# Patient Record
Sex: FEMALE | Race: WHITE | NOT HISPANIC OR LATINO | URBAN - METROPOLITAN AREA
[De-identification: names, ages, dates, MRNs, and addresses within clinical notes are randomized per-mention and may not be internally consistent; named-entity substitution may affect disease eponyms.]

---

## 2023-01-22 ENCOUNTER — EMERGENCY (EMERGENCY)
Facility: HOSPITAL | Age: 83
LOS: 1 days | Discharge: HOME | End: 2023-01-22
Attending: EMERGENCY MEDICINE | Admitting: EMERGENCY MEDICINE
Payer: MEDICARE

## 2023-01-22 VITALS
DIASTOLIC BLOOD PRESSURE: 80 MMHG | HEART RATE: 80 BPM | TEMPERATURE: 98 F | RESPIRATION RATE: 18 BRPM | SYSTOLIC BLOOD PRESSURE: 150 MMHG | OXYGEN SATURATION: 98 %

## 2023-01-22 VITALS
HEIGHT: 62 IN | DIASTOLIC BLOOD PRESSURE: 90 MMHG | WEIGHT: 130.07 LBS | OXYGEN SATURATION: 98 % | SYSTOLIC BLOOD PRESSURE: 190 MMHG | HEART RATE: 99 BPM | TEMPERATURE: 98 F | RESPIRATION RATE: 18 BRPM

## 2023-01-22 LAB
APPEARANCE UR: CLEAR — SIGNIFICANT CHANGE UP
BILIRUB UR-MCNC: NEGATIVE — SIGNIFICANT CHANGE UP
COLOR SPEC: YELLOW — SIGNIFICANT CHANGE UP
DIFF PNL FLD: NEGATIVE — SIGNIFICANT CHANGE UP
GLUCOSE UR QL: NEGATIVE MG/DL — SIGNIFICANT CHANGE UP
KETONES UR-MCNC: NEGATIVE — SIGNIFICANT CHANGE UP
LEUKOCYTE ESTERASE UR-ACNC: NEGATIVE — SIGNIFICANT CHANGE UP
NITRITE UR-MCNC: NEGATIVE — SIGNIFICANT CHANGE UP
PH UR: 8 — SIGNIFICANT CHANGE UP (ref 5–8)
PROT UR-MCNC: NEGATIVE MG/DL — SIGNIFICANT CHANGE UP
SP GR SPEC: 1.01 — SIGNIFICANT CHANGE UP (ref 1.01–1.02)
UROBILINOGEN FLD QL: NEGATIVE MG/DL — SIGNIFICANT CHANGE UP

## 2023-01-22 PROCEDURE — 70450 CT HEAD/BRAIN W/O DYE: CPT | Mod: MA

## 2023-01-22 PROCEDURE — 72125 CT NECK SPINE W/O DYE: CPT | Mod: MA

## 2023-01-22 PROCEDURE — 72170 X-RAY EXAM OF PELVIS: CPT | Mod: 26

## 2023-01-22 PROCEDURE — 81003 URINALYSIS AUTO W/O SCOPE: CPT

## 2023-01-22 PROCEDURE — 99285 EMERGENCY DEPT VISIT HI MDM: CPT

## 2023-01-22 PROCEDURE — 99284 EMERGENCY DEPT VISIT MOD MDM: CPT | Mod: 25

## 2023-01-22 PROCEDURE — 70450 CT HEAD/BRAIN W/O DYE: CPT | Mod: 26,MA

## 2023-01-22 PROCEDURE — 72170 X-RAY EXAM OF PELVIS: CPT

## 2023-01-22 PROCEDURE — 87086 URINE CULTURE/COLONY COUNT: CPT

## 2023-01-22 PROCEDURE — 72125 CT NECK SPINE W/O DYE: CPT | Mod: 26,MA

## 2023-01-22 RX ORDER — ALPRAZOLAM 0.25 MG
0 TABLET ORAL
Qty: 0 | Refills: 0 | DISCHARGE

## 2023-01-22 NOTE — ED PROVIDER NOTE - CARE PROVIDER_API CALL
Jose Miguel Ford (DO)  Medicine  [address], [address]  [place] [address]  Phone: [phone]  Fax: [phone]  Follow Up Time:

## 2023-01-22 NOTE — ED PROVIDER NOTE - PROGRESS NOTE DETAILS
Family states that patient commonly lies on the floor and the staff at the [facility] did not realize that. No evidence of head injury, and family doubts any injury. CTs already performed. Will await results

## 2023-01-22 NOTE — ED PROVIDER NOTE - PATIENT PORTAL LINK FT
You can access the FollowMyHealth Patient Portal offered by [facility] by registering at the following website: [email or web address]. By joining [facility]’s FollowMyHealth portal, you will also be able to view your health information using other applications (apps) compatible with our system.

## 2023-01-22 NOTE — ED PROVIDER NOTE - NSICDXPASTMEDICALHX_GEN_ALL_CORE_FT
PAST MEDICAL HISTORY:  DVT (deep venous thrombosis)     GERD (gastroesophageal reflux disease)     HTN (hypertension)     Insomnia

## 2023-01-22 NOTE — ED PROVIDER NOTE - OBJECTIVE STATEMENT
Patient sent in from assisted living after a fall.  Patient states she was given a medication to help her sleep.  Patient has known history of insomnia according to paperwork.  Patient states she did not sleep in her bed but rather slept on a bench.  States she went to get up and fell hitting her head on the floor.  Patient denies any loss of consciousness.  Patient complains of some pain to the left side of her neck and a mild headache.  No nausea vomiting.  No change in vision.  Patient denies any other injury.

## 2023-01-23 LAB
CULTURE RESULTS: SIGNIFICANT CHANGE UP
SPECIMEN SOURCE: SIGNIFICANT CHANGE UP

## 2023-02-05 ENCOUNTER — INPATIENT (INPATIENT)
Facility: HOSPITAL | Age: 83
LOS: 3 days | Discharge: NURSING, REHAB OR LONG-TERM CARE | DRG: 312 | End: 2023-02-09
Attending: INTERNAL MEDICINE | Admitting: INTERNAL MEDICINE
Payer: MEDICARE

## 2023-02-05 VITALS
HEART RATE: 64 BPM | HEIGHT: 62 IN | DIASTOLIC BLOOD PRESSURE: 74 MMHG | OXYGEN SATURATION: 100 % | TEMPERATURE: 98 F | RESPIRATION RATE: 20 BRPM | SYSTOLIC BLOOD PRESSURE: 143 MMHG

## 2023-02-05 DIAGNOSIS — R41.82 ALTERED MENTAL STATUS, UNSPECIFIED: ICD-10-CM

## 2023-02-05 LAB
A1C WITH ESTIMATED AVERAGE GLUCOSE RESULT: 5.5 % — SIGNIFICANT CHANGE UP (ref 4–5.6)
ALBUMIN SERPL ELPH-MCNC: 3.6 G/DL — SIGNIFICANT CHANGE UP (ref 3.3–5)
ALP SERPL-CCNC: 103 U/L — SIGNIFICANT CHANGE UP (ref 30–120)
ALT FLD-CCNC: 16 U/L DA — SIGNIFICANT CHANGE UP (ref 10–60)
ANION GAP SERPL CALC-SCNC: 8 MMOL/L — SIGNIFICANT CHANGE UP (ref 5–17)
APTT BLD: 40 SEC — HIGH (ref 27.5–35.5)
AST SERPL-CCNC: 27 U/L — SIGNIFICANT CHANGE UP (ref 10–40)
BASOPHILS # BLD AUTO: 0.04 K/UL — SIGNIFICANT CHANGE UP (ref 0–0.2)
BASOPHILS NFR BLD AUTO: 0.4 % — SIGNIFICANT CHANGE UP (ref 0–2)
BILIRUB SERPL-MCNC: 0.4 MG/DL — SIGNIFICANT CHANGE UP (ref 0.2–1.2)
BUN SERPL-MCNC: 16 MG/DL — SIGNIFICANT CHANGE UP (ref 7–23)
CALCIUM SERPL-MCNC: 8.9 MG/DL — SIGNIFICANT CHANGE UP (ref 8.4–10.5)
CHLORIDE SERPL-SCNC: 100 MMOL/L — SIGNIFICANT CHANGE UP (ref 96–108)
CO2 SERPL-SCNC: 25 MMOL/L — SIGNIFICANT CHANGE UP (ref 22–31)
CREAT SERPL-MCNC: 0.86 MG/DL — SIGNIFICANT CHANGE UP (ref 0.5–1.3)
EGFR: 67 ML/MIN/1.73M2 — SIGNIFICANT CHANGE UP
EOSINOPHIL # BLD AUTO: 0.34 K/UL — SIGNIFICANT CHANGE UP (ref 0–0.5)
EOSINOPHIL NFR BLD AUTO: 3.2 % — SIGNIFICANT CHANGE UP (ref 0–6)
ESTIMATED AVERAGE GLUCOSE: 111 MG/DL — SIGNIFICANT CHANGE UP (ref 68–114)
FLUAV AG NPH QL: SIGNIFICANT CHANGE UP
FLUBV AG NPH QL: SIGNIFICANT CHANGE UP
GLUCOSE SERPL-MCNC: 106 MG/DL — HIGH (ref 70–99)
HCT VFR BLD CALC: 43.2 % — SIGNIFICANT CHANGE UP (ref 34.5–45)
HGB BLD-MCNC: 14.1 G/DL — SIGNIFICANT CHANGE UP (ref 11.5–15.5)
IMM GRANULOCYTES NFR BLD AUTO: 0.3 % — SIGNIFICANT CHANGE UP (ref 0–0.9)
INR BLD: 1.1 RATIO — SIGNIFICANT CHANGE UP (ref 0.88–1.16)
LYMPHOCYTES # BLD AUTO: 1.82 K/UL — SIGNIFICANT CHANGE UP (ref 1–3.3)
LYMPHOCYTES # BLD AUTO: 17.3 % — SIGNIFICANT CHANGE UP (ref 13–44)
MCHC RBC-ENTMCNC: 31.1 PG — SIGNIFICANT CHANGE UP (ref 27–34)
MCHC RBC-ENTMCNC: 32.6 GM/DL — SIGNIFICANT CHANGE UP (ref 32–36)
MCV RBC AUTO: 95.2 FL — SIGNIFICANT CHANGE UP (ref 80–100)
MONOCYTES # BLD AUTO: 1.08 K/UL — HIGH (ref 0–0.9)
MONOCYTES NFR BLD AUTO: 10.3 % — SIGNIFICANT CHANGE UP (ref 2–14)
NEUTROPHILS # BLD AUTO: 7.22 K/UL — SIGNIFICANT CHANGE UP (ref 1.8–7.4)
NEUTROPHILS NFR BLD AUTO: 68.5 % — SIGNIFICANT CHANGE UP (ref 43–77)
NRBC # BLD: 0 /100 WBCS — SIGNIFICANT CHANGE UP (ref 0–0)
PLATELET # BLD AUTO: 229 K/UL — SIGNIFICANT CHANGE UP (ref 150–400)
POTASSIUM SERPL-MCNC: 4 MMOL/L — SIGNIFICANT CHANGE UP (ref 3.5–5.3)
POTASSIUM SERPL-SCNC: 4 MMOL/L — SIGNIFICANT CHANGE UP (ref 3.5–5.3)
PROT SERPL-MCNC: 7.5 G/DL — SIGNIFICANT CHANGE UP (ref 6–8.3)
PROTHROM AB SERPL-ACNC: 12.7 SEC — SIGNIFICANT CHANGE UP (ref 10.5–13.4)
RBC # BLD: 4.54 M/UL — SIGNIFICANT CHANGE UP (ref 3.8–5.2)
RBC # FLD: 13 % — SIGNIFICANT CHANGE UP (ref 10.3–14.5)
RSV RNA NPH QL NAA+NON-PROBE: SIGNIFICANT CHANGE UP
SARS-COV-2 RNA SPEC QL NAA+PROBE: SIGNIFICANT CHANGE UP
SODIUM SERPL-SCNC: 133 MMOL/L — LOW (ref 135–145)
TROPONIN I, HIGH SENSITIVITY RESULT: 70.7 NG/L — HIGH
WBC # BLD: 10.53 K/UL — HIGH (ref 3.8–10.5)
WBC # FLD AUTO: 10.53 K/UL — HIGH (ref 3.8–10.5)

## 2023-02-05 PROCEDURE — 99291 CRITICAL CARE FIRST HOUR: CPT

## 2023-02-05 PROCEDURE — 70496 CT ANGIOGRAPHY HEAD: CPT | Mod: 26,MA

## 2023-02-05 PROCEDURE — 71045 X-RAY EXAM CHEST 1 VIEW: CPT | Mod: 26

## 2023-02-05 PROCEDURE — 70450 CT HEAD/BRAIN W/O DYE: CPT | Mod: 26,MA,59

## 2023-02-05 PROCEDURE — 99223 1ST HOSP IP/OBS HIGH 75: CPT | Mod: AI

## 2023-02-05 PROCEDURE — 70498 CT ANGIOGRAPHY NECK: CPT | Mod: 26,MA

## 2023-02-05 PROCEDURE — 93010 ELECTROCARDIOGRAM REPORT: CPT

## 2023-02-05 RX ORDER — RISPERIDONE 4 MG/1
1 TABLET ORAL
Qty: 0 | Refills: 0 | DISCHARGE

## 2023-02-05 RX ORDER — FAMOTIDINE 10 MG/ML
20 INJECTION INTRAVENOUS DAILY
Refills: 0 | Status: DISCONTINUED | OUTPATIENT
Start: 2023-02-05 | End: 2023-02-09

## 2023-02-05 RX ORDER — LANOLIN ALCOHOL/MO/W.PET/CERES
5 CREAM (GRAM) TOPICAL AT BEDTIME
Refills: 0 | Status: DISCONTINUED | OUTPATIENT
Start: 2023-02-05 | End: 2023-02-09

## 2023-02-05 RX ORDER — PREGABALIN 225 MG/1
1000 CAPSULE ORAL
Qty: 0 | Refills: 0 | DISCHARGE

## 2023-02-05 RX ORDER — RISPERIDONE 4 MG/1
0.25 TABLET ORAL
Refills: 0 | Status: DISCONTINUED | OUTPATIENT
Start: 2023-02-05 | End: 2023-02-09

## 2023-02-05 RX ORDER — LOSARTAN POTASSIUM 100 MG/1
50 TABLET, FILM COATED ORAL DAILY
Refills: 0 | Status: DISCONTINUED | OUTPATIENT
Start: 2023-02-05 | End: 2023-02-09

## 2023-02-05 RX ORDER — PREGABALIN 225 MG/1
0 CAPSULE ORAL
Qty: 0 | Refills: 0 | DISCHARGE

## 2023-02-05 RX ORDER — ATORVASTATIN CALCIUM 80 MG/1
40 TABLET, FILM COATED ORAL AT BEDTIME
Refills: 0 | Status: DISCONTINUED | OUTPATIENT
Start: 2023-02-05 | End: 2023-02-09

## 2023-02-05 RX ORDER — METOPROLOL TARTRATE 50 MG
1 TABLET ORAL
Qty: 0 | Refills: 0 | DISCHARGE

## 2023-02-05 RX ORDER — POLYETHYLENE GLYCOL 3350 17 G/17G
0 POWDER, FOR SOLUTION ORAL
Qty: 0 | Refills: 0 | DISCHARGE

## 2023-02-05 RX ORDER — LANOLIN ALCOHOL/MO/W.PET/CERES
1 CREAM (GRAM) TOPICAL
Qty: 0 | Refills: 0 | DISCHARGE

## 2023-02-05 RX ORDER — ALPRAZOLAM 0.25 MG
0.25 TABLET ORAL
Refills: 0 | Status: DISCONTINUED | OUTPATIENT
Start: 2023-02-05 | End: 2023-02-09

## 2023-02-05 RX ORDER — ARIPIPRAZOLE 15 MG/1
10 TABLET ORAL DAILY
Refills: 0 | Status: DISCONTINUED | OUTPATIENT
Start: 2023-02-05 | End: 2023-02-09

## 2023-02-05 RX ORDER — MULTIVIT-MIN/FERROUS GLUCONATE 9 MG/15 ML
1 LIQUID (ML) ORAL DAILY
Refills: 0 | Status: DISCONTINUED | OUTPATIENT
Start: 2023-02-05 | End: 2023-02-09

## 2023-02-05 RX ORDER — METOPROLOL TARTRATE 50 MG
25 TABLET ORAL
Refills: 0 | Status: DISCONTINUED | OUTPATIENT
Start: 2023-02-05 | End: 2023-02-09

## 2023-02-05 RX ORDER — FAMOTIDINE 10 MG/ML
0 INJECTION INTRAVENOUS
Qty: 0 | Refills: 0 | DISCHARGE

## 2023-02-05 RX ORDER — POLYETHYLENE GLYCOL 3350 17 G/17G
17 POWDER, FOR SOLUTION ORAL DAILY
Refills: 0 | Status: DISCONTINUED | OUTPATIENT
Start: 2023-02-05 | End: 2023-02-09

## 2023-02-05 RX ADMIN — Medication 0.25 MILLIGRAM(S): at 17:21

## 2023-02-05 RX ADMIN — ATORVASTATIN CALCIUM 40 MILLIGRAM(S): 80 TABLET, FILM COATED ORAL at 22:06

## 2023-02-05 RX ADMIN — RISPERIDONE 0.25 MILLIGRAM(S): 4 TABLET ORAL at 17:21

## 2023-02-05 RX ADMIN — Medication 5 MILLIGRAM(S): at 22:06

## 2023-02-05 RX ADMIN — Medication 25 MILLIGRAM(S): at 17:21

## 2023-02-05 NOTE — ED ADULT NURSE REASSESSMENT NOTE - NS ED NURSE REASSESS COMMENT FT1
Attempted telestroke consult, unable to reach provider. Chantel CASTRO states he spoke with neuro and do not need assessment at this time. Plan for admission.

## 2023-02-05 NOTE — ED PROVIDER NOTE - OBJECTIVE STATEMENT
82-year-old female with history of hypertension, hypocalcemia, anemia, GERD, anxiety, DVT presents with 5 by EMS from the [facility].  Patient reportedly eating at her usual baseline mental status, when he suddenly became unresponsive.  EMS was called and found patient was unresponsive.  Patient then became more awake while in route to the hospital with some left-sided facial droop and right arm weakness.  Pt with some vomiting PTA as well. The symptoms seem to be improving since onset.  No known trauma.  Patient with reported history of dementia, unable to get additional history.

## 2023-02-05 NOTE — ED ADULT NURSE REASSESSMENT NOTE - NS ED NURSE REASSESS COMMENT FT1
Patient transferred to hospital bed for comfort. Bed alarm on, fall risk bracelet in place, nonstick fall socks in place, bed lock and in lowest position, call bell within reach, bed in front of nurses station for enhanced supervision. Pt eric Dinero at bedside. Son reported patient "normally sundowns at night, she will get confused, believe she sees people/things in the room that aren't there". As per son, patient reported to be at baseline mental status at this time. Pt currently calm, resting in bed, NAD noted.

## 2023-02-05 NOTE — H&P ADULT - HISTORY OF PRESENT ILLNESS
82-year-old female with history of hypertension, hypocalcemia, anemia, GERD, anxiety, DVT presents with 5 by EMS from the [facility].  Patient reportedly eating at her usual baseline mental status, when he suddenly became unresponsive.  EMS was called and found patient was unresponsive.  Patient then became more awake while in route to the hospital with some left-sided facial droop and right arm weakness.  Pt with some vomiting PTA as well. The symptoms seem to be improving since onset.  No known trauma.  on my examination pt mentation around baseline, no facial droop appreciated ct head no acute events, admitted for TIA

## 2023-02-05 NOTE — H&P ADULT - NSHPPHYSICALEXAM_GEN_ALL_CORE
PHYSICAL EXAM:  Vital Signs Last 24 Hrs  T(C): 36.4 (05 Feb 2023 14:25), Max: 36.4 (05 Feb 2023 14:25)  T(F): 97.6 (05 Feb 2023 14:25), Max: 97.6 (05 Feb 2023 14:25)  HR: 77 (05 Feb 2023 15:26) (64 - 77)  BP: 140/63 (05 Feb 2023 15:26) (124/72 - 143/74)  BP(mean): --  RR: 18 (05 Feb 2023 15:26) (18 - 20)  SpO2: 97% (05 Feb 2023 15:26) (96% - 100%)    Parameters below as of 05 Feb 2023 15:26  Patient On (Oxygen Delivery Method): room air        GENERAL: NAD, wHEAD:  Atraumatic, Normocephalic  EYES: EOMI, PERRLA, conjunctiva and sclera clear  ENMT: No tonsillar erythema, exudates, or enlargement; Moist mucous membranes, Good dentition, No lesions  NECK: Supple, No JVD, Normal thyroid  NERVOUS SYSTEM:  Alert & Oriented X3, Good concentration; Motor Strength 5/5 B/L upper and lower extremities; CHEST/LUNG: Clear to auscultation bilaterally; No rales, rhonchi, wheezing, or rubs  HEART: Regular rate and rhythm; No murmurs, rubs, or gallops  ABDOMEN: Soft, Nontender, Nondistended; Bowel sounds present  EXTREMITIES:  2+ Peripheral Pulses, No clubbing, cyanosis, or edema  LYMPH: No lymphadenopathy noted  SKIN: No rashes or lesions

## 2023-02-05 NOTE — ED ADULT NURSE NOTE - BEFAST EYES
Problem: Falls - Risk of:  Goal: Will remain free from falls  Description  Will remain free from falls  1/6/2020 2128 by Diya Alfonso  Outcome: Ongoing     Problem: Falls - Risk of:  Goal: Absence of physical injury  Description  Absence of physical injury  1/6/2020 2128 by Diya Calabrese  Outcome: Ongoing   Patient remains free from falls and injury patient safety will continue to be maintained per staffing rounds Q15. No

## 2023-02-05 NOTE — ED PROVIDER NOTE - DIFFERENTIAL DIAGNOSIS
Rule out CVA, electrolyte abnormality, infectious cause, other acute pathology Differential Diagnosis

## 2023-02-05 NOTE — ED ADULT NURSE NOTE - OBJECTIVE STATEMENT
83 y/o F PMH HTN, DVT, anemia, GERD, BIB EMS from [facility] for unresponsiveness. Per EMS pt was eating, became unresponsive, EMS called at 1350. EMS arrived to scene, noted RT sided facial droop, RUE weakness, and slurred speech. Code stroke called at 1426, Pt arrives responsive to verbal stimuli, emesis on clothing, STEVENS x4.  Pt taken directly to CT, upon return to room, pt mentation improving, able to answer questions and follow commands, motor, strength and sensation intact in upper and lower extremities. PERRL 3R bilaterally, EOMI. Endorsing blurry vision R> L.  Denies CP, SOB, n/v/d, fevers, chills, abdominal pain, urinary symptoms, weakness, fatigue, numbness, tingling in upper and lower extremities, HA, blurry vision. VSS updated on plan of care. 83 y/o F PMH HTN, DVT, anemia, GERD, BIB EMS from [facility] for unresponsiveness. Per EMS pt was eating, became unresponsive, EMS called at 1350. EMS arrived to scene, noted RT sided facial droop, RUE weakness, and slurred speech. Code stroke called at 1426, Pt arrives responsive to verbal stimuli, emesis on clothing, STEVENS x4.  Pt taken directly to CT, upon return to room, pt mentation improving, able to answer questions and follow commands, motor, strength and sensation intact in upper and lower extremities. PERRL 3R bilaterally, EOMI. Endorsing blurry vision R> L.  Denies CP, SOB, n/v/d, fevers, chills, abdominal pain, urinary symptoms, weakness, fatigue, numbness, tingling in upper and lower extremities, HA. VSS updated on plan of care.

## 2023-02-05 NOTE — H&P ADULT - NSHPLABSRESULTS_GEN_ALL_CORE
14.1   10.53 )-----------( 229      ( 05 Feb 2023 14:28 )             43.2       02-05    133<L>  |  100  |  16  ----------------------------<  106<H>  4.0   |  25  |  0.86    Ca    8.9      05 Feb 2023 14:28    TPro  7.5  /  Alb  3.6  /  TBili  0.4  /  DBili  x   /  AST  27  /  ALT  16  /  AlkPhos  103  02-05                  PT/INR - ( 05 Feb 2023 14:28 )   PT: 12.7 sec;   INR: 1.10 ratio         PTT - ( 05 Feb 2023 14:28 )  PTT:40.0 sec    Lactate Trend            CAPILLARY BLOOD GLUCOSE

## 2023-02-05 NOTE — PATIENT PROFILE ADULT - FALL HARM RISK - HARM RISK INTERVENTIONS
Assistance with ambulation/Assistance OOB with selected safe patient handling equipment/Communicate Risk of Fall with Harm to all staff/Discuss with provider need for PT consult/Monitor gait and stability/Provide patient with walking aids - walker, cane, crutches/Reinforce activity limits and safety measures with patient and family/Tailored Fall Risk Interventions/Use of alarms - bed, chair and/or voice tab/Visual Cue: Yellow wristband and red socks/Bed in lowest position, wheels locked, appropriate side rails in place/Call bell, personal items and telephone in reach/Instruct patient to call for assistance before getting out of bed or chair/Non-slip footwear when patient is out of bed/[place] to call system/Physically safe environment - no spills, clutter or unnecessary equipment/Purposeful Proactive Rounding/Room/bathroom lighting operational, light cord in reach

## 2023-02-05 NOTE — ED PROVIDER NOTE - ENMT, MLM
Airway patent, Nasal mucosa clear. Mouth with normal mucosa. Throat has no vesicles, no oropharyngeal exudates and uvula is midline. neck supple. no meningeal signs . no ext signs of head trauma.

## 2023-02-05 NOTE — ED ADULT TRIAGE NOTE - CHIEF COMPLAINT QUOTE
AMS x40 mins per [facility] staff, onset after eating lunch; possible L side facial droop/L side weakness   vomit x1 w/ EMS    in ED

## 2023-02-05 NOTE — ED PROVIDER NOTE - PROGRESS NOTE DETAILS
Patient/EMS Patient doing well at this time.  Patient now completely asymptomatic.  NIH stroke scale equals 0.  Patient is awake and alert, at her normal baseline mental status. Discussed with patient daughter-in-law, agree with treatment plan, holding off on tPA as patient is back to baseline. Discussed with telestroke, Dr. Jansen, who states at this point as patient is asymptomatic with a stroke scale of 0, even with CT angio findings, okay to keep at [facility], can do a work-up here and they can call with any acute changes in patient's status. Discussed with telestroke, Dr. Streeter, who states at this point as patient is asymptomatic with a stroke scale of 0, even with CT angio findings, okay to keep at [facility], can do a work-up here and they can call with any acute changes in patient's status. Patient doing well, no acute complaints at this time.  Had a long discussion with patient and family regarding all findings, need for admission for further work-up and evaluation. Discussed with Dr. Leach, will see patient to admit.

## 2023-02-05 NOTE — ED PROVIDER NOTE - CLINICAL SUMMARY MEDICAL DECISION MAKING FREE TEXT BOX
Altered mental status, sudden onset today with vomiting, facial droop and arm weakness associated with symptoms as well.  We will check labs, x-ray, CT, CT angio, IV fluids, will consider tPA if symptoms persist.

## 2023-02-05 NOTE — H&P ADULT - ASSESSMENT
82-year-old female with history of hypertension, hypocalcemia, anemia, GERD, anxiety, DVT presents with 5 by EMS from the [facility].  admitted for TIA vs cva    TIA  slureed speech, which resolved  head CT No acute hemorrhage, mass effect or extra-axial collections.   Ct angio  Multifocal areas of narrowing are likely on the basis of   atherosclerotic disease with the most focal stenosis along the proximal   posterior left M2 branch.  Neuro eval  asa/statin  lipid panel  cartoid doppler/hotler/echo    depression/anxiety  abilify/risperdone  mood stable    htn  lopressor  losartan    Insomnia  melatonin    DVT prophylaxis    time spent 45 minutes, discussd with RN, and family

## 2023-02-05 NOTE — ED ADULT NURSE REASSESSMENT NOTE - NS ED NURSE REASSESS COMMENT FT1
Report taken from ANDRY Dowd at 1900. Pt resting comfortably in stretcher, NAD noted at this time, respirations even and unlabored. Pt AAOx4, VS as documented, NIH=0. PERRL intact. No facial droop, arm drift, slurred speech noted on assessment. Pt denies pain/discomfort at this time. Pt denies numbness/tingling, CP, SOB, N/V, abdominal pain, headache, vision changes, neck pain, back pain. Pt reports she was given food earlier and not hungry at this time. Pt continued on puriwick and not soiled at this time. Pt provided warm blankets. Bed locked and in lowest position at this time, call bell within reach. Pt aware of admission to hospital and awaiting bed assignment upstairs. Report taken from ANDRY Dowd at 1900. Pt resting comfortably in stretcher, NAD noted at this time, respirations even and unlabored. Pt AAOx4, VS as documented, NIH=0. PERRL intact. No facial droop, arm drift, slurred speech noted on assessment. Cardiac monitor in place, normal sinus rhythm with rate in 60's noted at this time. Pt denies pain/discomfort at this time. Pt denies numbness/tingling, CP, SOB, N/V, abdominal pain, headache, vision changes, neck pain, back pain. Pt reports she was given food earlier and not hungry at this time. Pt continued on puriwick and not soiled at this time. Pt provided warm blankets. Bed locked and in lowest position at this time, call bell within reach. Pt aware of admission to hospital and awaiting bed assignment upstairs.

## 2023-02-06 ENCOUNTER — TRANSCRIPTION ENCOUNTER (OUTPATIENT)
Age: 83
End: 2023-02-06

## 2023-02-06 PROBLEM — K21.9 GASTRO-ESOPHAGEAL REFLUX DISEASE WITHOUT ESOPHAGITIS: Chronic | Status: ACTIVE | Noted: 2023-01-22

## 2023-02-06 PROBLEM — I10 ESSENTIAL (PRIMARY) HYPERTENSION: Chronic | Status: ACTIVE | Noted: 2023-01-22

## 2023-02-06 PROBLEM — G47.00 INSOMNIA, UNSPECIFIED: Chronic | Status: ACTIVE | Noted: 2023-01-22

## 2023-02-06 PROBLEM — I82.409 ACUTE EMBOLISM AND THROMBOSIS OF UNSPECIFIED DEEP VEINS OF UNSPECIFIED LOWER EXTREMITY: Chronic | Status: ACTIVE | Noted: 2023-01-22

## 2023-02-06 LAB
ALBUMIN SERPL ELPH-MCNC: 3.4 G/DL — SIGNIFICANT CHANGE UP (ref 3.3–5)
ALP SERPL-CCNC: 94 U/L — SIGNIFICANT CHANGE UP (ref 30–120)
ALT FLD-CCNC: 18 U/L DA — SIGNIFICANT CHANGE UP (ref 10–60)
ANION GAP SERPL CALC-SCNC: 10 MMOL/L — SIGNIFICANT CHANGE UP (ref 5–17)
AST SERPL-CCNC: 23 U/L — SIGNIFICANT CHANGE UP (ref 10–40)
BILIRUB SERPL-MCNC: 0.5 MG/DL — SIGNIFICANT CHANGE UP (ref 0.2–1.2)
BUN SERPL-MCNC: 17 MG/DL — SIGNIFICANT CHANGE UP (ref 7–23)
CALCIUM SERPL-MCNC: 8.9 MG/DL — SIGNIFICANT CHANGE UP (ref 8.4–10.5)
CHLORIDE SERPL-SCNC: 105 MMOL/L — SIGNIFICANT CHANGE UP (ref 96–108)
CHOLEST SERPL-MCNC: 134 MG/DL — SIGNIFICANT CHANGE UP
CO2 SERPL-SCNC: 28 MMOL/L — SIGNIFICANT CHANGE UP (ref 22–31)
CREAT SERPL-MCNC: 0.63 MG/DL — SIGNIFICANT CHANGE UP (ref 0.5–1.3)
EGFR: 87 ML/MIN/1.73M2 — SIGNIFICANT CHANGE UP
GLUCOSE BLDC GLUCOMTR-MCNC: 227 MG/DL — HIGH (ref 70–99)
GLUCOSE SERPL-MCNC: 90 MG/DL — SIGNIFICANT CHANGE UP (ref 70–99)
HCT VFR BLD CALC: 41.6 % — SIGNIFICANT CHANGE UP (ref 34.5–45)
HDLC SERPL-MCNC: 50 MG/DL — LOW
HGB BLD-MCNC: 13.6 G/DL — SIGNIFICANT CHANGE UP (ref 11.5–15.5)
LIPID PNL WITH DIRECT LDL SERPL: 62 MG/DL — SIGNIFICANT CHANGE UP
MCHC RBC-ENTMCNC: 30.8 PG — SIGNIFICANT CHANGE UP (ref 27–34)
MCHC RBC-ENTMCNC: 32.7 GM/DL — SIGNIFICANT CHANGE UP (ref 32–36)
MCV RBC AUTO: 94.3 FL — SIGNIFICANT CHANGE UP (ref 80–100)
NON HDL CHOLESTEROL: 84 MG/DL — SIGNIFICANT CHANGE UP
NRBC # BLD: 0 /100 WBCS — SIGNIFICANT CHANGE UP (ref 0–0)
PLATELET # BLD AUTO: 220 K/UL — SIGNIFICANT CHANGE UP (ref 150–400)
POTASSIUM SERPL-MCNC: 3.3 MMOL/L — LOW (ref 3.5–5.3)
POTASSIUM SERPL-SCNC: 3.3 MMOL/L — LOW (ref 3.5–5.3)
PROT SERPL-MCNC: 7.1 G/DL — SIGNIFICANT CHANGE UP (ref 6–8.3)
RBC # BLD: 4.41 M/UL — SIGNIFICANT CHANGE UP (ref 3.8–5.2)
RBC # FLD: 13 % — SIGNIFICANT CHANGE UP (ref 10.3–14.5)
SODIUM SERPL-SCNC: 143 MMOL/L — SIGNIFICANT CHANGE UP (ref 135–145)
TRIGL SERPL-MCNC: 111 MG/DL — SIGNIFICANT CHANGE UP
WBC # BLD: 7.17 K/UL — SIGNIFICANT CHANGE UP (ref 3.8–10.5)
WBC # FLD AUTO: 7.17 K/UL — SIGNIFICANT CHANGE UP (ref 3.8–10.5)

## 2023-02-06 PROCEDURE — 99232 SBSQ HOSP IP/OBS MODERATE 35: CPT

## 2023-02-06 RX ORDER — ASPIRIN/CALCIUM CARB/MAGNESIUM 324 MG
81 TABLET ORAL DAILY
Refills: 0 | Status: DISCONTINUED | OUTPATIENT
Start: 2023-02-06 | End: 2023-02-09

## 2023-02-06 RX ORDER — POTASSIUM CHLORIDE 20 MEQ
40 PACKET (EA) ORAL EVERY 4 HOURS
Refills: 0 | Status: COMPLETED | OUTPATIENT
Start: 2023-02-06 | End: 2023-02-06

## 2023-02-06 RX ADMIN — ATORVASTATIN CALCIUM 40 MILLIGRAM(S): 80 TABLET, FILM COATED ORAL at 21:06

## 2023-02-06 RX ADMIN — Medication 25 MILLIGRAM(S): at 08:57

## 2023-02-06 RX ADMIN — Medication 25 MILLIGRAM(S): at 17:59

## 2023-02-06 RX ADMIN — LOSARTAN POTASSIUM 50 MILLIGRAM(S): 100 TABLET, FILM COATED ORAL at 08:58

## 2023-02-06 RX ADMIN — Medication 40 MILLIEQUIVALENT(S): at 11:04

## 2023-02-06 RX ADMIN — Medication 1 TABLET(S): at 11:04

## 2023-02-06 RX ADMIN — ARIPIPRAZOLE 10 MILLIGRAM(S): 15 TABLET ORAL at 11:04

## 2023-02-06 RX ADMIN — Medication 81 MILLIGRAM(S): at 11:04

## 2023-02-06 RX ADMIN — Medication 0.25 MILLIGRAM(S): at 17:59

## 2023-02-06 RX ADMIN — FAMOTIDINE 20 MILLIGRAM(S): 10 INJECTION INTRAVENOUS at 11:04

## 2023-02-06 RX ADMIN — Medication 40 MILLIEQUIVALENT(S): at 13:47

## 2023-02-06 RX ADMIN — Medication 5 MILLIGRAM(S): at 21:06

## 2023-02-06 RX ADMIN — POLYETHYLENE GLYCOL 3350 17 GRAM(S): 17 POWDER, FOR SOLUTION ORAL at 11:04

## 2023-02-06 RX ADMIN — RISPERIDONE 0.25 MILLIGRAM(S): 4 TABLET ORAL at 17:59

## 2023-02-06 NOTE — DISCHARGE NOTE NURSING/CASE MANAGEMENT/SOCIAL WORK - NSDCCRTYPESERV_GEN_ALL_CORE_FT
You are a resident of above assisted living facility and receive assistance from staff You are a resident of above assisted living facility and receive assistance from staff; your family has engaged 24/7 private-hired care @ assisted living facility

## 2023-02-06 NOTE — CARE COORDINATION ASSESSMENT. - MET/SPOKE WITH
Assisted living facility's memory care unit director Zamzam @ p [phone] Patient; patient's son Bar; assisted living facility's memory care unit director Zamzam @  [phone]/patient/son/caregiver

## 2023-02-06 NOTE — CONSULT NOTE ADULT - ASSESSMENT
The patient is an 82 year old female with a history of HTN, DVT, anemia, anxiety, GERD who presents with an episode of unresponsiveness in the setting of TIA vs. syncope.    Plan:  - ECG with LVH related abnormalities  - Troponin borderline elevated at 70 in the setting of demand ischemia from possible syncope  - Echo with normal LV systolic function, no significant valve issues  - Monitor on telemetry  - CTA head and neck with atherosclerosis and right ICA aneurysm of 5.9 cm; chronic CVA but no acute CVA noted  - Continue losartan 50 mg daily  - Continue metoprolol tartrate 25 mg bid  - Continue atorvastatin 40 mg daily  - Add aspirin 81 mg daily  - Neurology eval The patient is an 82 year old female with a history of HTN, DVT, anemia, anxiety, GERD who presents with an episode of unresponsiveness in the setting of TIA vs. syncope.    Plan:  - ECG with LVH related abnormalities  - Troponin borderline elevated at 70 in the setting of demand ischemia from possible syncope  - Echo with normal LV systolic function, no significant valve issues  - Monitor on telemetry  - CTA head and neck with atherosclerosis and right ICA aneurysm of 5.9 mm; chronic CVA but no acute CVA noted  - Continue losartan 50 mg daily  - Continue metoprolol tartrate 25 mg bid  - Continue atorvastatin 40 mg daily  - Add aspirin 81 mg daily  - Neurology eval

## 2023-02-06 NOTE — OCCUPATIONAL THERAPY INITIAL EVALUATION ADULT - PERTINENT HX OF CURRENT PROBLEM, REHAB EVAL
82-year-old female with history of hypertension, hypocalcemia, anemia, GERD, anxiety, DVT presents by EMS from the [facility].  Patient reportedly eating at her usual baseline mental status, when she suddenly became unresponsive.  EMS was called and found patient was unresponsive.  Patient then became more awake while in route to the hospital with some left-sided facial droop and right arm weakness.  Pt with some vomiting PTA as well. The symptoms seem to be improving since onset.  No known trauma.   CT Brain 2/5/23: No acute hemorrhage, mass effect or extra-axial collections. Bilateral basal ganglia lucency consistent with ischemia of indeterminate age and unchanged compared with the prior study. Moderate microvascular ischemic change.

## 2023-02-06 NOTE — DISCHARGE NOTE NURSING/CASE MANAGEMENT/SOCIAL WORK - NSDCPEFALRISK_GEN_ALL_CORE
For information on Fall & Injury Prevention, visit: [email or web address] OR  [email or web address] OR  https://www.cdc.gov/steadi/patient.html

## 2023-02-06 NOTE — PROGRESS NOTE ADULT - SUBJECTIVE AND OBJECTIVE BOX
Patient is a 82y old  Female who presents with a chief complaint of r/o cva vs TIA (06 Feb 2023 08:52)        HPI:   82-year-old female with history of hypertension, hypocalcemia, anemia, GERD, anxiety, DVT presents with 5 by EMS from the [facility].  Patient reportedly eating at her usual baseline mental status, when he suddenly became unresponsive.  EMS was called and found patient was unresponsive.  Patient then became more awake while in route to the hospital with some left-sided facial droop and right arm weakness.  Pt with some vomiting PTA as well. The symptoms seem to be improving since onset.  No known trauma.  on my examination pt mentation around baseline, no facial droop appreciated ct head no acute events, admitted for TIA  (05 Feb 2023 16:43)      SUBJECTIVE & OBJECTIVE: Pt seen and examined at bedside. nad    PHYSICAL EXAM:  T(C): 36.6 (02-06-23 @ 08:53), Max: 36.8 (02-05-23 @ 19:45)  HR: 78 (02-06-23 @ 08:53) (62 - 90)  BP: 167/80 (02-06-23 @ 08:53) (124/72 - 167/80)  RR: 17 (02-06-23 @ 08:53) (15 - 20)  SpO2: 95% (02-06-23 @ 08:53) (93% - 100%)  Wt(kg): -- Height (cm): 157.5 (02-05 @ 14:25)  Weight (kg): 60 (02-05 @ 14:28)  BMI (kg/m2): 24.2 (02-05 @ 14:28)  BSA (m2): 1.6 (02-05 @ 14:28)  GENERAL: NAD, well-groomed, well-developed  NECK: Supple, No JVD  NERVOUS SYSTEM:  Alert & Oriented X3, CHEST/LUNG: Clear to auscultation bilaterally; No rales, rhonchi, wheezing, or rubs  HEART: Regular rate and rhythm; No murmurs, rubs, or gallops  ABDOMEN: Soft, Nontender, Nondistended; Bowel sounds present  EXTREMITIES:  2+ Peripheral Pulses, No clubbing, cyanosis, or edema        MEDICATIONS  (STANDING):  ALPRAZolam 0.25 milliGRAM(s) Oral two times a day  ARIPiprazole 10 milliGRAM(s) Oral daily  aspirin enteric coated 81 milliGRAM(s) Oral daily  atorvastatin 40 milliGRAM(s) Oral at bedtime  famotidine    Tablet 20 milliGRAM(s) Oral daily  losartan 50 milliGRAM(s) Oral daily  melatonin 5 milliGRAM(s) Oral at bedtime  metoprolol tartrate 25 milliGRAM(s) Oral two times a day  multivitamin/minerals 1 Tablet(s) Oral daily  polyethylene glycol 3350 17 Gram(s) Oral daily  risperiDONE   Tablet 0.25 milliGRAM(s) Oral two times a day    MEDICATIONS  (PRN):      LABS:                        13.6   7.17  )-----------( 220      ( 06 Feb 2023 07:53 )             41.6     02-06    143  |  105  |  17  ----------------------------<  90  3.3<L>   |  28  |  0.63    Ca    8.9      06 Feb 2023 07:53    TPro  7.1  /  Alb  3.4  /  TBili  0.5  /  DBili  x   /  AST  23  /  ALT  18  /  AlkPhos  94  02-06    PT/INR - ( 05 Feb 2023 14:28 )   PT: 12.7 sec;   INR: 1.10 ratio         PTT - ( 05 Feb 2023 14:28 )  PTT:40.0 sec      CAPILLARY BLOOD GLUCOSE          CAPILLARY BLOOD GLUCOSE        CAPILLARY BLOOD GLUCOSE      POCT Blood Glucose.: 227 mg/dL (05 Feb 2023 14:22)            RECENT CULTURES:      RADIOLOGY & ADDITIONAL TESTS:                        DVT/GI ppx  Discussed with pt @ bedside

## 2023-02-06 NOTE — DISCHARGE NOTE PROVIDER - ATTENDING DISCHARGE PHYSICAL EXAMINATION:
GENERAL: NAD, well-groomed, well-developed  HEAD:  Atraumatic, Normocephalic  EYES: EOMI, PERRLA, conjunctiva and sclera clear  ENMT: Moist mucous membranes, Good dentition, No lesions  NECK: Supple, No JVD appreciated  NERVOUS SYSTEM:  Alert & Oriented X3, Good concentration; All 4 extremities mobile, no gross sensory deficits.   CHEST/LUNG: Clear to auscultation bilaterally; No rales, rhonchi, wheezing, or rubs appreciated  HEART: Regular rate and rhythm; No murmurs, rubs, or gallops  ABDOMEN: Soft, Nontender, Nondistended; Bowel sounds present  EXTREMITIES:  No clubbing, cyanosis, or edema appreciated  LYMPH: No lymphadenopathy noted  SKIN: No rashes or lesions appreciated

## 2023-02-06 NOTE — ED ADULT NURSE REASSESSMENT NOTE - NS ED NURSE REASSESS COMMENT FT1
Pt resting comfortably in stretcher, sleeping at this time, respirations even and unlabored, cardiac monitor in place with (NSR noted). Pt awaiting bed assignment upstairs.

## 2023-02-06 NOTE — DISCHARGE NOTE NURSING/CASE MANAGEMENT/SOCIAL WORK - NSDCCRNAME_GEN_ALL_CORE_FT
The [facility] on [address] [phone]/ fax [phone]. Utilizes [facility] pharmacy [phone]

## 2023-02-06 NOTE — DISCHARGE NOTE PROVIDER - NSDCMRMEDTOKEN_GEN_ALL_CORE_FT
ALPRAZolam 0.25 mg oral tablet: orally 2 times a day  ARIPiprazole 10 mg oral tablet: 1 tab(s) orally once a day  atorvastatin 20 mg oral tablet: 1 tab(s) orally once a day  Calcium 600+D 600 mg-5 mcg (200 intl units) oral tablet: orally 2 times a day  cyanocobalamin 1000 mcg/mL injectable solution: 1000 microgram(s) injectable every 4 weeks  famotidine 20 mg oral tablet: orally once a day  folic acid 1 mg oral tablet: 1 tab(s) orally once a day  losartan 50 mg oral tablet: 1 tab(s) orally once a day  Melatonin 5 mg oral tablet: 1 tab(s) orally once a day (at bedtime)  metoprolol tartrate 25 mg oral tablet: 1 tab(s) orally 2 times a day  polyethylene glycol 3350 oral powder for reconstitution: orally once a day  PreserVision AREDS oral capsule: orally once a day  risperiDONE 0.25 mg oral tablet: 1 tab(s) orally 2 times a day  Vitamin B12 1000 mcg oral tablet: 1 tab(s) orally once a day  Vitamin D3 50 mcg (2000 intl units) oral tablet: 1 tab(s) orally once a day

## 2023-02-06 NOTE — CONSULT NOTE ADULT - SUBJECTIVE AND OBJECTIVE BOX
History of Present Illness: The patient is an 82 year old female with a history of HTN, DVT, anemia, anxiety, GERD who presents with unresponsiveness. She was eating dinner when she suddenly became unresponsive. EMS was called; patient became more responsive en route to hospital. There was reported left-sided facial droop and right arm weakness. Today the patient has no complaints and denies weakness, numbness, palpitations, dizziness, chest pain, shortness of breath.    Past Medical/Surgical History:  HTN, DVT, anemia, anxiety, GERD     Medications:  Home Medications:  ALPRAZolam 0.25 mg oral tablet: orally 2 times a day (05 Feb 2023 15:08)  ARIPiprazole 10 mg oral tablet: 1 tab(s) orally once a day (05 Feb 2023 16:19)  atorvastatin 20 mg oral tablet: 1 tab(s) orally once a day (05 Feb 2023 15:08)  Calcium 600+D 600 mg-5 mcg (200 intl units) oral tablet: orally 2 times a day (05 Feb 2023 15:08)  cyanocobalamin 1000 mcg/mL injectable solution: 1000 microgram(s) injectable every 4 weeks (05 Feb 2023 16:19)  famotidine 20 mg oral tablet: orally once a day (05 Feb 2023 15:08)  folic acid 1 mg oral tablet: 1 tab(s) orally once a day (05 Feb 2023 15:08)  losartan 50 mg oral tablet: 1 tab(s) orally once a day (05 Feb 2023 15:08)  Melatonin 5 mg oral tablet: 1 tab(s) orally once a day (at bedtime) (05 Feb 2023 15:08)  metoprolol tartrate 25 mg oral tablet: 1 tab(s) orally 2 times a day (05 Feb 2023 15:08)  polyethylene glycol 3350 oral powder for reconstitution: orally once a day (05 Feb 2023 15:08)  PreserVision AREDS oral capsule: orally once a day (05 Feb 2023 15:08)  risperiDONE 0.25 mg oral tablet: 1 tab(s) orally 2 times a day (05 Feb 2023 15:08)  Vitamin B12 1000 mcg oral tablet: 1 tab(s) orally once a day (05 Feb 2023 15:08)  Vitamin D3 50 mcg (2000 intl units) oral tablet: 1 tab(s) orally once a day (05 Feb 2023 15:08)      Family History: Non-contributory family history of premature cardiovascular atherosclerotic disease    Social History: No tobacco, alcohol or drug use    Review of Systems:  General: No fevers, chills, weight gain  Skin: No rashes, color changes  Cardiovascular: No chest pain, orthopnea  Respiratory: No shortness of breath, cough  Gastrointestinal: No nausea, abdominal pain  Genitourinary: No incontinence, pain with urination  Musculoskeletal: No pain, swelling, decreased range of motion  Neurological: No headache, weakness  Psychiatric: No depression, anxiety  Endocrine: No weight gain, increased thirst  All other systems are comprehensively negative.    Physical Exam:  Vitals:        Vital Signs Last 24 Hrs  T(C): 36.4 (06 Feb 2023 05:10), Max: 36.8 (05 Feb 2023 19:45)  T(F): 97.6 (06 Feb 2023 05:10), Max: 98.3 (05 Feb 2023 19:45)  HR: 77 (06 Feb 2023 05:10) (62 - 90)  BP: 162/74 (06 Feb 2023 05:10) (124/72 - 162/74)  BP(mean): --  RR: 18 (06 Feb 2023 05:10) (15 - 20)  SpO2: 93% (06 Feb 2023 05:10) (93% - 100%)  Parameters below as of 06 Feb 2023 05:10  Patient On (Oxygen Delivery Method): room air  General: NAD  HEENT: MMM  Neck: No JVD, no carotid bruit  Lungs: CTAB  CV: RRR, nl S1/S2, no M/R/G  Abdomen: S/NT/ND, +BS  Extremities: No LE edema, no cyanosis  Neuro: AAOx3, non-focal  Skin: No rash    Labs:                        13.6   7.17  )-----------( 220      ( 06 Feb 2023 07:53 )             41.6     02-06    143  |  105  |  17  ----------------------------<  90  3.3<L>   |  28  |  0.63    Ca    8.9      06 Feb 2023 07:53    TPro  7.1  /  Alb  3.4  /  TBili  0.5  /  DBili  x   /  AST  23  /  ALT  18  /  AlkPhos  94  02-06        PT/INR - ( 05 Feb 2023 14:28 )   PT: 12.7 sec;   INR: 1.10 ratio         PTT - ( 05 Feb 2023 14:28 )  PTT:40.0 sec    ECG/Telemetry: NSR, LAD, LVH with secondary repolarization abnormality

## 2023-02-06 NOTE — OCCUPATIONAL THERAPY INITIAL EVALUATION ADULT - DIAGNOSIS, OT EVAL
Pt with impaired strength, balance, coordination, cognition impacting pt's ability to complete ADLs, functional mobility/transfers.

## 2023-02-06 NOTE — DISCHARGE NOTE NURSING/CASE MANAGEMENT/SOCIAL WORK - NSSCNAMETXT_GEN_ALL_CORE
[facility] [phone]. Home care agency will reach out to you within 24-72 hours of your discharge to schedule home care visit/eval appointment with you. Please call agency for any queries regarding home care services

## 2023-02-06 NOTE — OCCUPATIONAL THERAPY INITIAL EVALUATION ADULT - NS ASR FOLLOW COMMAND OT EVAL
Pt is easily distracted, requires frequent re-direction to therapist directed tasks./75% of the time/able to follow single-step instructions

## 2023-02-06 NOTE — CARE COORDINATION ASSESSMENT. - NSPASTMEDSURGHISTORY_GEN_ALL_CORE_FT
PAST MEDICAL & SURGICAL HISTORY:  DVT (deep venous thrombosis)      GERD (gastroesophageal reflux disease)      HTN (hypertension)      Insomnia      No significant past surgical history

## 2023-02-06 NOTE — OCCUPATIONAL THERAPY INITIAL EVALUATION ADULT - LIVES WITH, PROFILE
Pt resides at the [facility] where she was independent with ADLs and functional mobility/transfers using a RW. Son at bedside reports confusion at baseline. Staff assist with IADLs./other relative

## 2023-02-06 NOTE — DISCHARGE NOTE NURSING/CASE MANAGEMENT/SOCIAL WORK - NSDCPEPTSTRK_GEN_ALL_CORE
Awake/Alert
Call 911 for stroke/Need for follow up after discharge/Prescribed medications/Risk factors for stroke/Stroke education booklet/Stroke support groups for patients, families, and friends/Stroke warning signs and symptoms/Signs and symptoms of stroke

## 2023-02-06 NOTE — DISCHARGE NOTE NURSING/CASE MANAGEMENT/SOCIAL WORK - NSDPFAC_GEN_ALL_CORE
[facility] on [address] [phone]/ fax [phone].

## 2023-02-06 NOTE — SWALLOW BEDSIDE ASSESSMENT ADULT - ADDITIONAL RECOMMENDATIONS
This dept to f/u as schedule permits for diet tolerance and trial of swallow therapy, however, pt with intermittent difficulty following directives noted throughout assessment which may impact therapeutic candidacy. MD advised to further reconsult should concern regarding diet management arise.

## 2023-02-06 NOTE — CARE COORDINATION ASSESSMENT. - NSDCPLANSERVICES_GEN_ALL_CORE
Per assisted living facility's memory care unit director, patient became a resident of the memory care unit approx. 1y ago after sub-acute rehab & is typically able to ambulate w/ her rolling walker independently, albeit very slowly. [facility] would be able to accept patient back as long as she can participate in transfers/ ambulation & does not have any aspiration precautions in place. Per assisted living facility's memory care unit director, patient became a resident of the memory care unit approx. 1y ago after sub-acute rehab & is typically able to ambulate w/ her rolling walker independently, albeit very slowly. [facility] would be able to accept patient back as long as she can participate in transfers/ ambulation & does not have any aspiration precautions in place.  spoke w/ patient's son Bar over the phone who also expressed preference toward patient returning to the [facility] on discharge & stated that he would pay privately for additional care should patient require such to return./Home Care

## 2023-02-06 NOTE — SWALLOW BEDSIDE ASSESSMENT ADULT - SWALLOW EVAL: DIAGNOSIS
1- functional oral management given regular/solid, puree and thin liquid textures marked by adequate bolus collection, transfer and transport. 2- mild pharyngeal dysphagia given above consistencies marked by mildly delayed pharyngeal swallow trigger and mildly reduced hyolaryngeal excursion without any overt s/s of penetration/aspiration noted.

## 2023-02-06 NOTE — SWALLOW BEDSIDE ASSESSMENT ADULT - ASR SWALLOW RECOMMEND DIAG
not indicated at this time given clinical presentation, clear chest imaging as well as pt/family denying history of dysphagia

## 2023-02-06 NOTE — PROGRESS NOTE ADULT - ASSESSMENT
82-year-old female with history of hypertension, hypocalcemia, anemia, GERD, anxiety, DVT presents with 5 by EMS from the [facility].    TIA like symptoms, secondary to syncope  cva ruled out  slurred speech, which resolved  head CT No acute hemorrhage, mass effect or extra-axial collections.   Ct angio  Multifocal areas of narrowing are likely on the basis of   atherosclerotic disease with the most focal stenosis along the proximal   posterior left M2 branch.  Neuro ben pending  asa/statin  lipid panel    depression/anxiety  Abilify risperidone  mood stable    htn  lopressor  losartan    Insomnia  melatonin    DVT prophylaxis    time spent 45 minutes, discussd with RN, and family    82-year-old female with history of hypertension, hypocalcemia, anemia, GERD, anxiety, DVT presents with 5 by EMS from the [facility].    TIA like symptoms, secondary to syncope  cva ruled out  slurred speech, which resolved  head CT No acute hemorrhage, mass effect or extra-axial collections.   Ct angio  Multifocal areas of narrowing are likely on the basis of   atherosclerotic disease with the most focal stenosis along the proximal   posterior left M2 branch.  Neuro ben pending  asa/statin  lipid panel    Aspiraiton?  speech therapy recommending modified barium  pending results for disposition     depression/anxiety  Abilify risperidone  mood stable    htn  lopressor  losartan    Insomnia  melatonin    DVT prophylaxis    time spent 45 minutes, discussd with RN, and family    82-year-old female with history of hypertension, hypocalcemia, anemia, GERD, anxiety, DVT presents with 5 by EMS from the [facility].    TIA like symptoms, secondary to syncope  cva ruled out  slurred speech, which resolved  head CT No acute hemorrhage, mass effect or extra-axial collections.   Ct angio  Multifocal areas of narrowing are likely on the basis of   atherosclerotic disease with the most focal stenosis along the proximal   posterior left M2 branch.  Neuro ben pending      depression/anxiety  Abilify risperidone  mood stable    htn  lopressor  losartan    Insomnia  melatonin    DVT prophylaxis    time spent 25 minutes, discussd with RN, and family

## 2023-02-06 NOTE — CASE MANAGEMENT PROGRESS NOTE - NSCMPROGRESSNOTE_GEN_ALL_CORE
RN/CM received call from MD about pt being medically optimized for DC within 24H. Noted that PT is recommending rehab but son, Bar [phone] is NOT receptive to rehab placement. CM reached out to [facility] [place] [phone], [facility] unit director, Zamzam HOLM - apprised her re: above. Assisted living facility staff will reach out to son and discuss DC options. AS per [facility], if pt to return to assisted living facility, will need the following DC paperwork to be faxed over to [phone]: [facility] attestation form, updated PT report, COVID results within 48-72 H of DC; any NEW/CHANGED meds to be sent to [facility] pharmacy, home care agency of choice is [facility] [phone]. CM remains available and continues to follow case.

## 2023-02-06 NOTE — DISCHARGE NOTE NURSING/CASE MANAGEMENT/SOCIAL WORK - OTHER MODE OF TRANSPORTATION
You are being transported upon your discharge from the hospital via ambulance [facility] [phone]

## 2023-02-06 NOTE — PHYSICAL THERAPY INITIAL EVALUATION ADULT - PERTINENT HX OF CURRENT PROBLEM, REHAB EVAL
82-year-old female with history of hypertension, hypocalcemia, anemia, GERD, anxiety, DVT presents with 5 by EMS from the [facility].  Patient reportedly eating at her usual baseline mental status, when he suddenly became unresponsive.  EMS was called and found patient was unresponsive.  Patient then became more awake while in route to the hospital with some left-sided facial droop and right arm weakness.  Pt with some vomiting PTA as well. The symptoms seem to be improving since onset.  Head CT->neg; CTA neg. 82-year-old female with history of hypertension, hypocalcemia, anemia, GERD, anxiety, DVT presents with 5 by EMS from the [facility].  Patient reportedly eating at her usual baseline mental status, when she suddenly became unresponsive.  EMS was called and found patient was unresponsive.  Patient then became more awake while in route to the hospital with some left-sided facial droop and right arm weakness.  Pt with some vomiting PTA as well. The symptoms seem to be improving since onset.  Head CT->neg; CTA neg.

## 2023-02-06 NOTE — SWALLOW BEDSIDE ASSESSMENT ADULT - COMMENTS
Pt was awake, cooperative and positioned chairside for a clinical assessment of swallow function this AM. Per charting, pt is an "82 year old female with a history of HTN, DVT, anemia, anxiety, GERD who presents with unresponsiveness. She was eating dinner when she suddenly became unresponsive. EMS was called; patient became more responsive en route to hospital. There was reported left-sided facial droop and right arm weakness. Today the patient has no complaints and denies weakness, numbness, palpitations, dizziness, chest pain, shortness of breath."     Recent head CT "No acute hemorrhage, mass effect or extra-axial collections. Bilateral basal ganglia lucency consistent with ischemia of indeterminate age and unchanged compared with the prior study. Moderate microvascular ischemic change."    Recent CXR "No acute radiographic findings, in particular normal pulmonary vasculature"    At the time of today's assessment, pt's son is present at bedside who reports prior to admission, pt consumed a regular/solid diet with thin liquids. Upon clinician questioning, pt's son further reports his mother is currently presenting at her baseline. There is no evidence of dysarthria noted during today's assessment.

## 2023-02-06 NOTE — DISCHARGE NOTE NURSING/CASE MANAGEMENT/SOCIAL WORK - PATIENT PORTAL LINK FT
You can access the FollowMyHealth Patient Portal offered by [facility] by registering at the following website: [email or web address]. By joining [email or web address]’s FollowMyHealth portal, you will also be able to view your health information using other applications (apps) compatible with our system.

## 2023-02-06 NOTE — SPEECH LANGUAGE PATHOLOGY EVALUATION - COMMENTS
MD orders received. Chart reviewed. Contacted Dr. Leach to review pt case at which time MD confirms medical team is requesting a clinical swallow evaluation, not a speech-language evaluation at this time. MD advised to place corresponding order and this service will continue to f/u as schedule permits for desired evaluation. MD in agreement and states he will place correct order.

## 2023-02-06 NOTE — DISCHARGE NOTE PROVIDER - HOSPITAL COURSE
82-year-old female with history of hypertension, hypocalcemia, anemia, GERD, anxiety, DVT presents with 5 by EMS from the [facility].    TIA like symptoms, secondary to syncope  cva ruled out  slurred speech, which resolved  head CT No acute hemorrhage, mass effect or extra-axial collections.   Ct angio  Multifocal areas of narrowing are likely on the basis of   atherosclerotic disease with the most focal stenosis along the proximal   posterior left M2 branch.  Neuro ben appreicated cleared for d/c, needs private aid reinstated  d/c planning likely am       depression/anxiety  Abilify risperidone  mood stable    htn  lopressor  losartan    Insomnia  melatonin    82-year-old female with history of hypertension, hypocalcemia, anemia, GERD, anxiety, DVT presents with 5 by EMS from the [facility].    TIA like symptoms, secondary to syncope  CVA ruled out  slurred speech, which resolved  head CT No acute hemorrhage, mass effect or extra-axial collections.   Ct angio  Multifocal areas of narrowing are likely on the basis of   atherosclerotic disease with the most focal stenosis along the proximal   posterior left M2 branch.  Neuro evaluated, cleared for d/c, needs private aid reinstated      depression/anxiety  mood stable  Continue home Abilify, risperidone    htn  continue home lopressor, losartan    Insomnia  melatonin

## 2023-02-06 NOTE — DISCHARGE NOTE PROVIDER - CARE PROVIDER_API CALL
Jose Miguel Ford (DO)  Medicine  [address] [facility], [address]  [place] [address]  Phone: [phone]  Fax: [phone]  Established Patient  Follow Up Time: Routine

## 2023-02-06 NOTE — PATIENT CHOICE NOTE. - NSPTCHOICENOTES_GEN_ALL_CORE
Discharge planning resource folder, which includes choice lists for sub-acute rehab facilities & home care agencies, as well as transportation benefit information letter & contact info for [facility] private professional caregiver licensed agency.

## 2023-02-06 NOTE — DISCHARGE NOTE PROVIDER - NSDCCPCAREPLAN_GEN_ALL_CORE_FT
PRINCIPAL DISCHARGE DIAGNOSIS  Diagnosis: Altered mental state  Assessment and Plan of Treatment:       SECONDARY DISCHARGE DIAGNOSES  Diagnosis: Brain TIA  Assessment and Plan of Treatment: Evaluated by neurology, stroke ruled out  Head CT shows no acute hemorrhage or tumor. CT Angiogram shows multiple areas of narrowing of arteries, which may be due to athereosclerosis  Follow up with your primary care doctor

## 2023-02-06 NOTE — PATIENT CHOICE NOTE. - NSPTCHOICESTATE_GEN_ALL_CORE

## 2023-02-06 NOTE — CARE COORDINATION ASSESSMENT. - NSCAREPROVIDERS_GEN_ALL_CORE_FT
CARE PROVIDERS:  Accepting Physician: Jeff Leach  Access Services: Layo Callahan  Administration: Trey Hogan  Administration: Phylicia Reyes  Administration: Kristin Yeh  Admitting: Jeff Leach  Attending: Jeff Leach  Cardiology Technician: Maggy Christiansen  Case Management: Kristie Hernandez  Consultant: Martin Chandler  Consultant: Chino Guadarrama  Consultant: Mendez Ahn  ED Attending: Philip Golden  ED Nurse: Nisa Ohara  Infection Control: Kristin Rodríguez  Nurse: Vangie Bland  Nurse: Myra Maxwell  Nurse: Cherry Jeff  Nurse: Murray Segundo  Nurse: Roxann Morgan  Nurse: Yoselin Peacock  Occupational Therapy: Kaela Espinal  Ordered: ServiceAccount, [facility]  Outpatient Provider: Martin Chandler  Override: Vangie Bland  Override: Yoselin Peacock  PCA/Nursing Assistant: Joselo Evans  PCA/Nursing Assistant: Giovanni Cintron  Physical Therapy: Jw Mcfarland  Registered Dietitian: Lula Maxwell  Research: Mendez Ahn  Respiratory Therapy: Niki Richardson  Respiratory Therapy: Morgan Mares  Respiratory Therapy: Max Gutierrez  : Carley Roberts  : Doris Pederson  Team: MEOÑ  Hospitalists, Team  UR// Supp. Assoc.: Maricarmen Prasad   CARE PROVIDERS:  Accepting Physician: Jeff Leach  Access Services: Layo Callahan  Administration: Phylicia Reyes  Administration: Trey Hogan  Administration: Kristin Yeh  Admitting: Jeff Leach  Attending: Jeff Leach  Cardiology Technician: Maggy Christiansen  Case Management: Kristie Hernandez  Consultant: Martin Chandler  Consultant: Chino Guadarrama  Consultant: Mendez Ahn  ED Attending: Philip Golden  ED Nurse: Nisa Ohara  Infection Control: Kristin Rodríguez  Nurse: Myra Maxwell  Nurse: Cherry Jeff  Nurse: Roxann Morgan  Nurse: Yoselin Peacock  Occupational Therapy: Kaela Espinal  Ordered: Iesha, [facility]  Outpatient Provider: Martin Chandler  Override: Vangie Bland  Override: Yoselin Peacock  PCA/Nursing Assistant: Joselo Evans  PCA/Nursing Assistant: Giovanni Cintron  Physical Therapy: Jw Mcfarland  Registered Dietitian: Lula Maxwell  Research: Mendez Ahn  Respiratory Therapy: Niki Richardson  Respiratory Therapy: Max Gutierrez  Respiratory Therapy: Morgan Mares  : Carley Roberts  : Doris Pederson  Team: MEÑO  Hospitalists, Team  UR// Supp. Assoc.: Maricarmen Prasad

## 2023-02-07 LAB
ALBUMIN SERPL ELPH-MCNC: 3.6 G/DL — SIGNIFICANT CHANGE UP (ref 3.3–5)
ALP SERPL-CCNC: 100 U/L — SIGNIFICANT CHANGE UP (ref 30–120)
ALT FLD-CCNC: 20 U/L DA — SIGNIFICANT CHANGE UP (ref 10–60)
ANION GAP SERPL CALC-SCNC: 12 MMOL/L — SIGNIFICANT CHANGE UP (ref 5–17)
AST SERPL-CCNC: 22 U/L — SIGNIFICANT CHANGE UP (ref 10–40)
BILIRUB SERPL-MCNC: 0.6 MG/DL — SIGNIFICANT CHANGE UP (ref 0.2–1.2)
BUN SERPL-MCNC: 13 MG/DL — SIGNIFICANT CHANGE UP (ref 7–23)
CALCIUM SERPL-MCNC: 9.2 MG/DL — SIGNIFICANT CHANGE UP (ref 8.4–10.5)
CHLORIDE SERPL-SCNC: 104 MMOL/L — SIGNIFICANT CHANGE UP (ref 96–108)
CO2 SERPL-SCNC: 25 MMOL/L — SIGNIFICANT CHANGE UP (ref 22–31)
CREAT SERPL-MCNC: 0.57 MG/DL — SIGNIFICANT CHANGE UP (ref 0.5–1.3)
EGFR: 90 ML/MIN/1.73M2 — SIGNIFICANT CHANGE UP
GLUCOSE SERPL-MCNC: 99 MG/DL — SIGNIFICANT CHANGE UP (ref 70–99)
HCT VFR BLD CALC: 45.7 % — HIGH (ref 34.5–45)
HGB BLD-MCNC: 15 G/DL — SIGNIFICANT CHANGE UP (ref 11.5–15.5)
MCHC RBC-ENTMCNC: 30.7 PG — SIGNIFICANT CHANGE UP (ref 27–34)
MCHC RBC-ENTMCNC: 32.8 GM/DL — SIGNIFICANT CHANGE UP (ref 32–36)
MCV RBC AUTO: 93.6 FL — SIGNIFICANT CHANGE UP (ref 80–100)
NRBC # BLD: 0 /100 WBCS — SIGNIFICANT CHANGE UP (ref 0–0)
PLATELET # BLD AUTO: 238 K/UL — SIGNIFICANT CHANGE UP (ref 150–400)
POTASSIUM SERPL-MCNC: 3.9 MMOL/L — SIGNIFICANT CHANGE UP (ref 3.5–5.3)
POTASSIUM SERPL-SCNC: 3.9 MMOL/L — SIGNIFICANT CHANGE UP (ref 3.5–5.3)
PROT SERPL-MCNC: 7.5 G/DL — SIGNIFICANT CHANGE UP (ref 6–8.3)
RBC # BLD: 4.88 M/UL — SIGNIFICANT CHANGE UP (ref 3.8–5.2)
RBC # FLD: 12.9 % — SIGNIFICANT CHANGE UP (ref 10.3–14.5)
SODIUM SERPL-SCNC: 141 MMOL/L — SIGNIFICANT CHANGE UP (ref 135–145)
WBC # BLD: 6.9 K/UL — SIGNIFICANT CHANGE UP (ref 3.8–10.5)
WBC # FLD AUTO: 6.9 K/UL — SIGNIFICANT CHANGE UP (ref 3.8–10.5)

## 2023-02-07 PROCEDURE — 99232 SBSQ HOSP IP/OBS MODERATE 35: CPT

## 2023-02-07 RX ADMIN — ATORVASTATIN CALCIUM 40 MILLIGRAM(S): 80 TABLET, FILM COATED ORAL at 21:29

## 2023-02-07 RX ADMIN — Medication 81 MILLIGRAM(S): at 11:05

## 2023-02-07 RX ADMIN — POLYETHYLENE GLYCOL 3350 17 GRAM(S): 17 POWDER, FOR SOLUTION ORAL at 11:05

## 2023-02-07 RX ADMIN — RISPERIDONE 0.25 MILLIGRAM(S): 4 TABLET ORAL at 05:54

## 2023-02-07 RX ADMIN — ARIPIPRAZOLE 10 MILLIGRAM(S): 15 TABLET ORAL at 11:04

## 2023-02-07 RX ADMIN — LOSARTAN POTASSIUM 50 MILLIGRAM(S): 100 TABLET, FILM COATED ORAL at 05:53

## 2023-02-07 RX ADMIN — Medication 1 TABLET(S): at 11:05

## 2023-02-07 RX ADMIN — FAMOTIDINE 20 MILLIGRAM(S): 10 INJECTION INTRAVENOUS at 11:05

## 2023-02-07 RX ADMIN — Medication 25 MILLIGRAM(S): at 17:24

## 2023-02-07 RX ADMIN — Medication 5 MILLIGRAM(S): at 21:29

## 2023-02-07 RX ADMIN — Medication 25 MILLIGRAM(S): at 05:54

## 2023-02-07 RX ADMIN — Medication 0.25 MILLIGRAM(S): at 17:24

## 2023-02-07 RX ADMIN — RISPERIDONE 0.25 MILLIGRAM(S): 4 TABLET ORAL at 17:25

## 2023-02-07 RX ADMIN — Medication 0.25 MILLIGRAM(S): at 05:53

## 2023-02-07 NOTE — CASE MANAGEMENT PROGRESS NOTE - NSCMPROGRESSNOTE_GEN_ALL_CORE
RN/CM reached out to [facility] assisted living facility @ [place] [phone], spoke to [facility] director, Zamzam and Wellness RN Nia- provided both with pt's PT report and apprised both that son is opting for pt to return to assisted living facility rather than rehab as recommended by PT. Apprised [facility] staff that son has stated that he is willing to private-hire for 2 weeks as long as pt could be accommodated for return to assisted living facility. [facility] staff stated that they are in agreement with PT recommendation and suggested that pt go to [facility] upon hospital DC.  AS per both staff of [facility], they will reach out to sonBar and discuss pt's case, and will call this CM back with outcome of said discussion. Both CM and MSW remain available and continue to follow case.

## 2023-02-07 NOTE — PROGRESS NOTE ADULT - SUBJECTIVE AND OBJECTIVE BOX
Neurology follow up note    GUNNER STEINBERG82yFemale      Interval History:    Patient feels ok no new complaints.    Allergies    No Known Allergies    Intolerances        MEDICATIONS    ALPRAZolam 0.25 milliGRAM(s) Oral two times a day  ARIPiprazole 10 milliGRAM(s) Oral daily  aspirin enteric coated 81 milliGRAM(s) Oral daily  atorvastatin 40 milliGRAM(s) Oral at bedtime  famotidine    Tablet 20 milliGRAM(s) Oral daily  losartan 50 milliGRAM(s) Oral daily  melatonin 5 milliGRAM(s) Oral at bedtime  metoprolol tartrate 25 milliGRAM(s) Oral two times a day  multivitamin/minerals 1 Tablet(s) Oral daily  polyethylene glycol 3350 17 Gram(s) Oral daily  risperiDONE   Tablet 0.25 milliGRAM(s) Oral two times a day              Vital Signs Last 24 Hrs  T(C): 36.3 (07 Feb 2023 09:51), Max: 37 (06 Feb 2023 22:02)  T(F): 97.4 (07 Feb 2023 09:51), Max: 98.6 (06 Feb 2023 22:02)  HR: 64 (07 Feb 2023 09:51) (64 - 91)  BP: 139/79 (07 Feb 2023 09:51) (114/96 - 188/95)  BP(mean): --  RR: 16 (07 Feb 2023 09:51) (16 - 20)  SpO2: 96% (07 Feb 2023 09:51) (94% - 98%)    Parameters below as of 07 Feb 2023 09:51  Patient On (Oxygen Delivery Method): room air      REVIEW OF SYSTEMS:  Constitutional:  The patient denies fever, chills, or night sweats.  Head:  No headaches.  Eyes:  No double vision or blurry vision.  Ears:  No ringing in the ears.  Neck:  No neck pain.  Cardiovascular: No chest pain.  Respiratory:  No shortness of breath.  Abdomen:  No nausea, vomiting, or abdominal pain.  Genitourinary:  No burning upon urination.  Extremities/Neurological:  No numbness or tingling.  Musculoskeletal:  No joint pain.    PHYSICAL EXAMINATION:   HEENT:  Head:  Normocephalic, atraumatic.  Eyes:  No scleral icterus.  Ears:  Hearing bilaterally intact.  NECK:  Supple.  CARDIOVASCULAR:  S1 and S2 heard.  RESPIRATORY:  Good air entry bilaterally.  ABDOMEN:  Soft and nontender.  EXTREMITIES:  No clubbing or cyanosis was noted.      NEUROLOGIC:  The patient awake and alert.  Location hospital.  Upon conversing with the patient, positive memory issues.  .  Extraocular movements were intact.  Full visual fields.  Speech was fluent.  Smile symmetric.  Motor:  Bilateral upper 4/5.  Bilateral lower 4-/5.  Sensory:  Bilateral upper and lower intact to light touch.  Uses a walker to ambulate with.                LABS:  CBC Full  -  ( 07 Feb 2023 08:03 )  WBC Count : 6.90 K/uL  RBC Count : 4.88 M/uL  Hemoglobin : 15.0 g/dL  Hematocrit : 45.7 %  Platelet Count - Automated : 238 K/uL  Mean Cell Volume : 93.6 fl  Mean Cell Hemoglobin : 30.7 pg  Mean Cell Hemoglobin Concentration : 32.8 gm/dL  Auto Neutrophil # : x  Auto Lymphocyte # : x  Auto Monocyte # : x  Auto Eosinophil # : x  Auto Basophil # : x  Auto Neutrophil % : x  Auto Lymphocyte % : x  Auto Monocyte % : x  Auto Eosinophil % : x  Auto Basophil % : x      02-07    141  |  104  |  13  ----------------------------<  99  3.9   |  25  |  0.57    Ca    9.2      07 Feb 2023 08:03    TPro  7.5  /  Alb  3.6  /  TBili  0.6  /  DBili  x   /  AST  22  /  ALT  20  /  AlkPhos  100  02-07    Hemoglobin A1C:     LIVER FUNCTIONS - ( 07 Feb 2023 08:03 )  Alb: 3.6 g/dL / Pro: 7.5 g/dL / ALK PHOS: 100 U/L / ALT: 20 U/L DA / AST: 22 U/L / GGT: x           Vitamin B12   PT/INR - ( 05 Feb 2023 14:28 )   PT: 12.7 sec;   INR: 1.10 ratio         PTT - ( 05 Feb 2023 14:28 )  PTT:40.0 sec      RADIOLOGY  ANALYSIS AND PLAN:  An 82-year-old with episode of unresponsiveness, left facial droop, and right-sided weakness.  Clinical impression is most likely TIA-like symptoms secondary to cerebral hypoperfusion.  The patient does have multiple areas of narrowing vessels, suspect less likely this was a true TIA due to the distribution of symptoms, left and right hemisphere, points towards a global process.  Telemetry evaluation as needed.  Monitor systolic blood pressure.  For history of mild cognitive impairment versus subtle dementia supportive treatment   For history of hyperlipidemia, continue the patient on statin.  For history of hypertension, monitor systolic blood pressures.  ams at night suspect form sundowning   neurologic  wise stable dc planning     Spoke with son, Bar, at bedside 2/6, telephone number [phone], his wife's name is Celeste, [phone] called both today went to voicemail  he does understand while in the hospital setting, she may become more disoriented.  Already, the patient is on mood stabilizer.    Greater than 45 minutes of time was spent with the patient, plan of care, reviewing data, with greater than 50% of the visit was spent counseling and/or coordinating care with multidisciplinary healthcare team

## 2023-02-07 NOTE — PROGRESS NOTE ADULT - ASSESSMENT
82-year-old female with history of hypertension, hypocalcemia, anemia, GERD, anxiety, DVT presents with 5 by EMS from the [facility].    TIA like symptoms, secondary to syncope  cva ruled out  slurred speech, which resolved  head CT No acute hemorrhage, mass effect or extra-axial collections.   Ct angio  Multifocal areas of narrowing are likely on the basis of   atherosclerotic disease with the most focal stenosis along the proximal   posterior left M2 branch.  Neuro ben pending      depression/anxiety  Abilify risperidone  mood stable    htn  lopressor  losartan    Insomnia  melatonin    DVT prophylaxis    time spent 25 minutes, discussd with RN, and family    82-year-old female with history of hypertension, hypocalcemia, anemia, GERD, anxiety, DVT presents with 5 by EMS from the [facility].    TIA like symptoms, secondary to syncope  CVA ruled out  slurred speech, which resolved  head CT No acute hemorrhage, mass effect or extra-axial collections.   Ct angio  Multifocal areas of narrowing are likely on the basis of atherosclerotic disease with the most focal stenosis along the proximal posterior left M2 branch.  Neuro consulted, no further changes at this time    depression/anxiety  continue home Abilify risperidone  mood stable    HTN  continue home lopressor, losartan    Insomnia  melatonin    DVT prophylaxis    time spent 25 minutes, discussd with RN, and family

## 2023-02-07 NOTE — PROGRESS NOTE ADULT - SUBJECTIVE AND OBJECTIVE BOX
Chief Complaint: Unresponsiveness    Interval Events: No events overnight.    Review of Systems:  General: No fevers, chills, weight gain  Skin: No rashes, color changes  Cardiovascular: No chest pain, orthopnea  Respiratory: No shortness of breath, cough  Gastrointestinal: No nausea, abdominal pain  Genitourinary: No incontinence, pain with urination  Musculoskeletal: No pain, swelling, decreased range of motion  Neurological: No headache, weakness  Psychiatric: No depression, anxiety  Endocrine: No weight gain, increased thirst  All other systems are comprehensively negative.    Physical Exam:  Vitals:        Vital Signs Last 24 Hrs  T(C): 36.3 (07 Feb 2023 05:13), Max: 37 (06 Feb 2023 22:02)  T(F): 97.4 (07 Feb 2023 05:13), Max: 98.6 (06 Feb 2023 22:02)  HR: 75 (07 Feb 2023 05:13) (66 - 91)  BP: 151/90 (07 Feb 2023 05:13) (114/96 - 188/95)  BP(mean): --  RR: 18 (07 Feb 2023 05:13) (16 - 20)  SpO2: 95% (07 Feb 2023 05:13) (94% - 98%)  Parameters below as of 07 Feb 2023 05:13  Patient On (Oxygen Delivery Method): room air  General: NAD  HEENT: MMM  Neck: No JVD, no carotid bruit  Lungs: CTAB  CV: RRR, nl S1/S2, no M/R/G  Abdomen: S/NT/ND, +BS  Extremities: No LE edema, no cyanosis  Neuro: AAOx3, non-focal  Skin: No rash    Labs:                        15.0   6.90  )-----------( 238      ( 07 Feb 2023 08:03 )             45.7     02-06    143  |  105  |  17  ----------------------------<  90  3.3<L>   |  28  |  0.63    Ca    8.9      06 Feb 2023 07:53    TPro  7.1  /  Alb  3.4  /  TBili  0.5  /  DBili  x   /  AST  23  /  ALT  18  /  AlkPhos  94  02-06        PT/INR - ( 05 Feb 2023 14:28 )   PT: 12.7 sec;   INR: 1.10 ratio         PTT - ( 05 Feb 2023 14:28 )  PTT:40.0 sec    ECG/Telemetry: Sinus rhythm

## 2023-02-07 NOTE — PROGRESS NOTE ADULT - ASSESSMENT
The patient is an 82 year old female with a history of HTN, DVT, anemia, anxiety, GERD who presents with an episode of unresponsiveness in the setting of TIA vs. syncope.    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

## 2023-02-07 NOTE — PROGRESS NOTE ADULT - SUBJECTIVE AND OBJECTIVE BOX
Patient is a 82y old  Female who presents with a chief complaint of r/o cva vs TIA (07 Feb 2023 11:49)      INTERVAL HPI/OVERNIGHT EVENTS:    MEDICATIONS  (STANDING):  ALPRAZolam 0.25 milliGRAM(s) Oral two times a day  ARIPiprazole 10 milliGRAM(s) Oral daily  aspirin enteric coated 81 milliGRAM(s) Oral daily  atorvastatin 40 milliGRAM(s) Oral at bedtime  famotidine    Tablet 20 milliGRAM(s) Oral daily  losartan 50 milliGRAM(s) Oral daily  melatonin 5 milliGRAM(s) Oral at bedtime  metoprolol tartrate 25 milliGRAM(s) Oral two times a day  multivitamin/minerals 1 Tablet(s) Oral daily  polyethylene glycol 3350 17 Gram(s) Oral daily  risperiDONE   Tablet 0.25 milliGRAM(s) Oral two times a day    MEDICATIONS  (PRN):      Allergies    No Known Allergies    Intolerances        REVIEW OF SYSTEMS:  CONSTITUTIONAL: No fever, weight loss, or fatigue  EYES: No eye pain, visual disturbances, or discharge  ENMT:  No difficulty hearing, tinnitus, vertigo; No sinus or throat pain  NECK: No pain or stiffness  RESPIRATORY: No cough, wheezing, chills or hemoptysis; No shortness of breath  CARDIOVASCULAR: No chest pain, palpitations, lightheadedness, or leg swelling  GASTROINTESTINAL: No abdominal or epigastric pain. No nausea, vomiting, or hematemesis; No diarrhea or constipation. No melena or hematochezia.  GENITOURINARY: No dysuria, frequency, hematuria, or incontinence  NEUROLOGICAL: No headaches, memory loss, vertigo, loss of strength, numbness, or tremors  SKIN: No itching, burning, rashes, or lesions   LYMPH NODES: No enlarged glands  ENDOCRINE: No heat or cold intolerance; No hair loss; No polydipsia or polyuria  MUSCULOSKELETAL: No joint pain or swelling; No muscle, back, or extremity pain  PSYCHIATRIC: No depression, anxiety, or mood swings  HEME/LYMPH: No easy bruising, or bleeding gums  ALLERGY AND IMMUNOLOGIC: No hives or eczema    PHYSICAL EXAM:  GENERAL: NAD, well-groomed, well-developed  HEAD:  Atraumatic, Normocephalic  EYES: EOMI, PERRLA, conjunctiva and sclera clear  ENMT: Moist mucous membranes, Good dentition, No lesions  NECK: Supple, No JVD appreciated  NERVOUS SYSTEM:  Alert & Oriented X3, Good concentration; All 4 extremities mobile, no gross sensory deficits.   CHEST/LUNG: Clear to auscultation bilaterally; No rales, rhonchi, wheezing, or rubs appreciated  HEART: Regular rate and rhythm; No murmurs, rubs, or gallops  ABDOMEN: Soft, Nontender, Nondistended; Bowel sounds present  EXTREMITIES:  No clubbing, cyanosis, or edema appreciated  LYMPH: No lymphadenopathy noted  SKIN: No rashes or lesions appreciated    Vital Signs Last 24 Hrs  T(C): 36.7 (07 Feb 2023 13:43), Max: 37 (06 Feb 2023 22:02)  T(F): 98.1 (07 Feb 2023 13:43), Max: 98.6 (06 Feb 2023 22:02)  HR: 71 (07 Feb 2023 13:43) (64 - 91)  BP: 163/72 (07 Feb 2023 13:43) (114/96 - 188/95)  BP(mean): --  RR: 16 (07 Feb 2023 13:43) (16 - 20)  SpO2: 95% (07 Feb 2023 13:43) (94% - 98%)    Parameters below as of 07 Feb 2023 13:43  Patient On (Oxygen Delivery Method): room air        LABS:                        15.0   6.90  )-----------( 238      ( 07 Feb 2023 08:03 )             45.7     07 Feb 2023 08:03    141    |  104    |  13     ----------------------------<  99     3.9     |  25     |  0.57     Ca    9.2        07 Feb 2023 08:03    TPro  7.5    /  Alb  3.6    /  TBili  0.6    /  DBili  x      /  AST  22     /  ALT  20     /  AlkPhos  100    07 Feb 2023 08:03    PT/INR - ( 05 Feb 2023 14:28 )   PT: 12.7 sec;   INR: 1.10 ratio         PTT - ( 05 Feb 2023 14:28 )  PTT:40.0 sec    CAPILLARY BLOOD GLUCOSE          RADIOLOGY & ADDITIONAL TESTS:    Imaging Personally Reviewed:  [ ] YES     Consultant(s) Notes Reviewed:      Care Discussed with Consultants/Other Providers:    Advanced Directives: [ ] DNR  [ ] No feeding tube  [ ] MOLST in chart  [ ] MOLST completed today  [ ] Unknown   Patient is a 82y old  Female who presents with a chief complaint of r/o cva vs TIA (07 Feb 2023 11:49)      INTERVAL HPI/OVERNIGHT EVENTS:  Patient seen awake in bed. She reports feeling well, son at bedside reports patient is at mental baseline. Nurse reports patient gets confused overnight and attempts to get out of bed or wander.      MEDICATIONS  (STANDING):  ALPRAZolam 0.25 milliGRAM(s) Oral two times a day  ARIPiprazole 10 milliGRAM(s) Oral daily  aspirin enteric coated 81 milliGRAM(s) Oral daily  atorvastatin 40 milliGRAM(s) Oral at bedtime  famotidine    Tablet 20 milliGRAM(s) Oral daily  losartan 50 milliGRAM(s) Oral daily  melatonin 5 milliGRAM(s) Oral at bedtime  metoprolol tartrate 25 milliGRAM(s) Oral two times a day  multivitamin/minerals 1 Tablet(s) Oral daily  polyethylene glycol 3350 17 Gram(s) Oral daily  risperiDONE   Tablet 0.25 milliGRAM(s) Oral two times a day    MEDICATIONS  (PRN):      Allergies    No Known Allergies    Intolerances        REVIEW OF SYSTEMS:  CONSTITUTIONAL: No fever, weight loss, or fatigue  EYES: No eye pain, visual disturbances, or discharge  ENMT:  No difficulty hearing, tinnitus, vertigo; No sinus or throat pain  NECK: No pain or stiffness  RESPIRATORY: No cough, wheezing, chills or hemoptysis; No shortness of breath  CARDIOVASCULAR: No chest pain, palpitations, lightheadedness, or leg swelling  GASTROINTESTINAL: No abdominal or epigastric pain. No nausea, vomiting, or hematemesis; No diarrhea or constipation. No melena or hematochezia.  GENITOURINARY: No dysuria, frequency, hematuria, or incontinence  NEUROLOGICAL: No headaches, memory loss, vertigo, loss of strength, numbness, or tremors  SKIN: No itching, burning, rashes, or lesions   LYMPH NODES: No enlarged glands  ENDOCRINE: No heat or cold intolerance; No hair loss; No polydipsia or polyuria  MUSCULOSKELETAL: No joint pain or swelling; No muscle, back, or extremity pain      PHYSICAL EXAM:  GENERAL: NAD, well-groomed, well-developed  HEAD:  Atraumatic, Normocephalic  EYES: EOMI, PERRLA, conjunctiva and sclera clear  ENMT: Moist mucous membranes, Good dentition, No lesions  NECK: Supple, No JVD appreciated  NERVOUS SYSTEM:  Alert & Oriented X3, Good concentration; All 4 extremities mobile, no gross sensory deficits.   CHEST/LUNG: Clear to auscultation bilaterally; No rales, rhonchi, wheezing, or rubs appreciated  HEART: Regular rate and rhythm; No murmurs, rubs, or gallops  ABDOMEN: Soft, Nontender, Nondistended; Bowel sounds present  EXTREMITIES:  No clubbing, cyanosis, or edema appreciated  LYMPH: No lymphadenopathy noted  SKIN: No rashes or lesions appreciated    Vital Signs Last 24 Hrs  T(C): 36.7 (07 Feb 2023 13:43), Max: 37 (06 Feb 2023 22:02)  T(F): 98.1 (07 Feb 2023 13:43), Max: 98.6 (06 Feb 2023 22:02)  HR: 71 (07 Feb 2023 13:43) (64 - 91)  BP: 163/72 (07 Feb 2023 13:43) (114/96 - 188/95)  BP(mean): --  RR: 16 (07 Feb 2023 13:43) (16 - 20)  SpO2: 95% (07 Feb 2023 13:43) (94% - 98%)    Parameters below as of 07 Feb 2023 13:43  Patient On (Oxygen Delivery Method): room air        LABS:                        15.0   6.90  )-----------( 238      ( 07 Feb 2023 08:03 )             45.7     07 Feb 2023 08:03    141    |  104    |  13     ----------------------------<  99     3.9     |  25     |  0.57     Ca    9.2        07 Feb 2023 08:03    TPro  7.5    /  Alb  3.6    /  TBili  0.6    /  DBili  x      /  AST  22     /  ALT  20     /  AlkPhos  100    07 Feb 2023 08:03    PT/INR - ( 05 Feb 2023 14:28 )   PT: 12.7 sec;   INR: 1.10 ratio         PTT - ( 05 Feb 2023 14:28 )  PTT:40.0 sec    CAPILLARY BLOOD GLUCOSE

## 2023-02-07 NOTE — SOCIAL WORK PROGRESS NOTE - NSSWPROGRESSNOTE_GEN_ALL_CORE
Per discussion w/ inpatient interdisciplinary treatment team this AM, patient is medically cleared for transition to next level of care today.  received call from Ruchi, Director of Case Management for [facility], @ [phone] indicating that patient needs to be seen by inpatient physical therapy dept again prior to discharge back to assisted living in order to verify that she meets their functional requirements.  notified rehab dept supervisor who will have patient be seen tomorrow AM (02/08/2023). Son @ bedside aware as well.  &  to continue to follow.

## 2023-02-08 LAB — SARS-COV-2 RNA SPEC QL NAA+PROBE: SIGNIFICANT CHANGE UP

## 2023-02-08 PROCEDURE — 99232 SBSQ HOSP IP/OBS MODERATE 35: CPT

## 2023-02-08 RX ADMIN — RISPERIDONE 0.25 MILLIGRAM(S): 4 TABLET ORAL at 05:50

## 2023-02-08 RX ADMIN — Medication 5 MILLIGRAM(S): at 23:16

## 2023-02-08 RX ADMIN — FAMOTIDINE 20 MILLIGRAM(S): 10 INJECTION INTRAVENOUS at 11:35

## 2023-02-08 RX ADMIN — POLYETHYLENE GLYCOL 3350 17 GRAM(S): 17 POWDER, FOR SOLUTION ORAL at 11:35

## 2023-02-08 RX ADMIN — Medication 25 MILLIGRAM(S): at 05:50

## 2023-02-08 RX ADMIN — Medication 1 TABLET(S): at 11:34

## 2023-02-08 RX ADMIN — ARIPIPRAZOLE 10 MILLIGRAM(S): 15 TABLET ORAL at 11:35

## 2023-02-08 RX ADMIN — Medication 25 MILLIGRAM(S): at 17:30

## 2023-02-08 RX ADMIN — Medication 0.25 MILLIGRAM(S): at 05:50

## 2023-02-08 RX ADMIN — RISPERIDONE 0.25 MILLIGRAM(S): 4 TABLET ORAL at 17:29

## 2023-02-08 RX ADMIN — Medication 81 MILLIGRAM(S): at 11:35

## 2023-02-08 RX ADMIN — ATORVASTATIN CALCIUM 40 MILLIGRAM(S): 80 TABLET, FILM COATED ORAL at 21:14

## 2023-02-08 RX ADMIN — LOSARTAN POTASSIUM 50 MILLIGRAM(S): 100 TABLET, FILM COATED ORAL at 05:50

## 2023-02-08 RX ADMIN — Medication 0.25 MILLIGRAM(S): at 17:29

## 2023-02-08 NOTE — PROGRESS NOTE ADULT - SUBJECTIVE AND OBJECTIVE BOX
Patient is a 82y old  Female who presents with a chief complaint of r/o cva vs TIA (08 Feb 2023 11:33)      INTERVAL HPI/OVERNIGHT EVENTS:  Patient seen awake in chair, eating lunch. She reports feeling well. Nurse reports patient gets confused overnight and attempts to get out of bed or wander and was moved to room closer to nurses station.      MEDICATIONS  (STANDING):  ALPRAZolam 0.25 milliGRAM(s) Oral two times a day  ARIPiprazole 10 milliGRAM(s) Oral daily  aspirin enteric coated 81 milliGRAM(s) Oral daily  atorvastatin 40 milliGRAM(s) Oral at bedtime  famotidine    Tablet 20 milliGRAM(s) Oral daily  losartan 50 milliGRAM(s) Oral daily  melatonin 5 milliGRAM(s) Oral at bedtime  metoprolol tartrate 25 milliGRAM(s) Oral two times a day  multivitamin/minerals 1 Tablet(s) Oral daily  polyethylene glycol 3350 17 Gram(s) Oral daily  risperiDONE   Tablet 0.25 milliGRAM(s) Oral two times a day    MEDICATIONS  (PRN):      Allergies    No Known Allergies    Intolerances        REVIEW OF SYSTEMS:  CONSTITUTIONAL: No fever, weight loss, or fatigue  EYES: No eye pain, visual disturbances, or discharge  ENMT:  No difficulty hearing, tinnitus, vertigo; No sinus or throat pain  NECK: No pain or stiffness  RESPIRATORY: No cough, wheezing, chills or hemoptysis; No shortness of breath  CARDIOVASCULAR: No chest pain, palpitations, lightheadedness, or leg swelling  GASTROINTESTINAL: No abdominal or epigastric pain. No nausea, vomiting, or hematemesis; No diarrhea or constipation. No melena or hematochezia.  GENITOURINARY: No dysuria, frequency, hematuria, or incontinence  NEUROLOGICAL: No headaches, memory loss, vertigo, loss of strength, numbness, or tremors  SKIN: No itching, burning, rashes, or lesions   LYMPH NODES: No enlarged glands  ENDOCRINE: No heat or cold intolerance; No hair loss; No polydipsia or polyuria  MUSCULOSKELETAL: No joint pain or swelling; No muscle, back, or extremity pain      PHYSICAL EXAM:  GENERAL: NAD, well-groomed, well-developed  HEAD:  Atraumatic, Normocephalic  EYES: EOMI, PERRLA, conjunctiva and sclera clear  ENMT: Moist mucous membranes, Good dentition, No lesions  NECK: Supple, No JVD appreciated  NERVOUS SYSTEM:  Alert & Oriented X3, Good concentration; All 4 extremities mobile, no gross sensory deficits.   CHEST/LUNG: Clear to auscultation bilaterally; No rales, rhonchi, wheezing, or rubs appreciated  HEART: Regular rate and rhythm; No murmurs, rubs, or gallops  ABDOMEN: Soft, Nontender, Nondistended; Bowel sounds present  EXTREMITIES:  No clubbing, cyanosis, or edema appreciated  LYMPH: No lymphadenopathy noted  SKIN: No rashes or lesions appreciated      Vital Signs Last 24 Hrs  T(C): 36.3 (08 Feb 2023 10:13), Max: 37 (07 Feb 2023 17:18)  T(F): 97.4 (08 Feb 2023 10:13), Max: 98.6 (07 Feb 2023 17:18)  HR: 70 (08 Feb 2023 10:13) (62 - 71)  BP: 117/70 (08 Feb 2023 10:13) (117/70 - 163/72)  BP(mean): --  RR: 16 (08 Feb 2023 10:13) (16 - 18)  SpO2: 94% (08 Feb 2023 10:13) (94% - 96%)    Parameters below as of 08 Feb 2023 10:13  Patient On (Oxygen Delivery Method): room air        LABS:      Ca    9.2        07 Feb 2023 08:03          CAPILLARY BLOOD GLUCOSE

## 2023-02-08 NOTE — PROGRESS NOTE ADULT - SUBJECTIVE AND OBJECTIVE BOX
Chief Complaint: Unresponsiveness    Interval Events: No events overnight.    Review of Systems:  General: No fevers, chills, weight gain  Skin: No rashes, color changes  Cardiovascular: No chest pain, orthopnea  Respiratory: No shortness of breath, cough  Gastrointestinal: No nausea, abdominal pain  Genitourinary: No incontinence, pain with urination  Musculoskeletal: No pain, swelling, decreased range of motion  Neurological: No headache, weakness  Psychiatric: No depression, anxiety  Endocrine: No weight gain, increased thirst  All other systems are comprehensively negative.    Physical Exam:  Vital Signs Last 24 Hrs  T(C): 36.4 (08 Feb 2023 05:08), Max: 37 (07 Feb 2023 17:18)  T(F): 97.5 (08 Feb 2023 05:08), Max: 98.6 (07 Feb 2023 17:18)  HR: 67 (08 Feb 2023 05:08) (62 - 71)  BP: 137/71 (08 Feb 2023 05:08) (135/80 - 163/72)  BP(mean): --  RR: 17 (08 Feb 2023 05:08) (16 - 18)  SpO2: 96% (08 Feb 2023 05:08) (95% - 96%)  Parameters below as of 08 Feb 2023 05:08  Patient On (Oxygen Delivery Method): room air  General: NAD  HEENT: MMM  Neck: No JVD, no carotid bruit  Lungs: CTAB  CV: RRR, nl S1/S2, no M/R/G  Abdomen: S/NT/ND, +BS  Extremities: No LE edema, no cyanosis  Neuro: AAOx3, non-focal  Skin: No rash    Labs:             02-07    141  |  104  |  13  ----------------------------<  99  3.9   |  25  |  0.57    Ca    9.2      07 Feb 2023 08:03    TPro  7.5  /  Alb  3.6  /  TBili  0.6  /  DBili  x   /  AST  22  /  ALT  20  /  AlkPhos  100  02-07                        15.0   6.90  )-----------( 238      ( 07 Feb 2023 08:03 )             45.7       ECG/Telemetry: Sinus rhythm

## 2023-02-08 NOTE — PROGRESS NOTE ADULT - ASSESSMENT
82-year-old female with history of hypertension, hypocalcemia, anemia, GERD, anxiety, DVT presents with 5 by EMS from the [facility].    TIA like symptoms, secondary to syncope  CVA ruled out  slurred speech, resolved  head CT No acute hemorrhage, mass effect or extra-axial collections.   Ct angio  Multifocal areas of narrowing are likely on the basis of atherosclerotic disease with the most focal stenosis along the proximal posterior left M2 branch.  Neuro consulted, no further changes at this time    depression/anxiety  continue home Abilify risperidone  mood stable    HTN  continue home lopressor, losartan    Insomnia  melatonin

## 2023-02-08 NOTE — PROGRESS NOTE ADULT - SUBJECTIVE AND OBJECTIVE BOX
Neurology follow up note    GUNNER STEINBERG82yFemale      Interval History:    Patient feels ok no new complaints.    Allergies    No Known Allergies    Intolerances        MEDICATIONS    ALPRAZolam 0.25 milliGRAM(s) Oral two times a day  ARIPiprazole 10 milliGRAM(s) Oral daily  aspirin enteric coated 81 milliGRAM(s) Oral daily  atorvastatin 40 milliGRAM(s) Oral at bedtime  famotidine    Tablet 20 milliGRAM(s) Oral daily  losartan 50 milliGRAM(s) Oral daily  melatonin 5 milliGRAM(s) Oral at bedtime  metoprolol tartrate 25 milliGRAM(s) Oral two times a day  multivitamin/minerals 1 Tablet(s) Oral daily  polyethylene glycol 3350 17 Gram(s) Oral daily  risperiDONE   Tablet 0.25 milliGRAM(s) Oral two times a day              Vital Signs Last 24 Hrs  T(C): 36.3 (08 Feb 2023 10:13), Max: 37 (07 Feb 2023 17:18)  T(F): 97.4 (08 Feb 2023 10:13), Max: 98.6 (07 Feb 2023 17:18)  HR: 70 (08 Feb 2023 10:13) (62 - 71)  BP: 117/70 (08 Feb 2023 10:13) (117/70 - 163/72)  BP(mean): --  RR: 16 (08 Feb 2023 10:13) (16 - 18)  SpO2: 94% (08 Feb 2023 10:13) (94% - 96%)    Parameters below as of 08 Feb 2023 10:13  Patient On (Oxygen Delivery Method): room air    REVIEW OF SYSTEMS:  Constitutional:  The patient denies fever, chills, or night sweats.  Head:  No headaches.  Eyes:  No double vision or blurry vision.  Ears:  No ringing in the ears.  Neck:  No neck pain.  Cardiovascular: No chest pain.  Respiratory:  No shortness of breath.  Abdomen:  No nausea, vomiting, or abdominal pain.  Genitourinary:  No burning upon urination.  Extremities/Neurological:  No numbness or tingling.  Musculoskeletal:  No joint pain.    PHYSICAL EXAMINATION:   HEENT:  Head:  Normocephalic, atraumatic.  Eyes:  No scleral icterus.  Ears:  Hearing bilaterally intact.  NECK:  Supple.  CARDIOVASCULAR:  S1 and S2 heard.  RESPIRATORY:  Good air entry bilaterally.  ABDOMEN:  Soft and nontender.  EXTREMITIES:  No clubbing or cyanosis was noted.      NEUROLOGIC:  The patient awake and alert.  Location hospital.  Upon conversing with the patient, positive memory issues.  .  Extraocular movements were intact.  Full visual fields.  Speech was fluent.  Smile symmetric.  Motor:  Bilateral upper 4/5.  Bilateral lower 4-/5.  Sensory:  Bilateral upper and lower intact to light touch.  Uses a walker to ambulate with.    LABS:  CBC Full  -  ( 07 Feb 2023 08:03 )  WBC Count : 6.90 K/uL  RBC Count : 4.88 M/uL  Hemoglobin : 15.0 g/dL  Hematocrit : 45.7 %  Platelet Count - Automated : 238 K/uL  Mean Cell Volume : 93.6 fl  Mean Cell Hemoglobin : 30.7 pg  Mean Cell Hemoglobin Concentration : 32.8 gm/dL  Auto Neutrophil # : x  Auto Lymphocyte # : x  Auto Monocyte # : x  Auto Eosinophil # : x  Auto Basophil # : x  Auto Neutrophil % : x  Auto Lymphocyte % : x  Auto Monocyte % : x  Auto Eosinophil % : x  Auto Basophil % : x      02-07    141  |  104  |  13  ----------------------------<  99  3.9   |  25  |  0.57    Ca    9.2      07 Feb 2023 08:03    TPro  7.5  /  Alb  3.6  /  TBili  0.6  /  DBili  x   /  AST  22  /  ALT  20  /  AlkPhos  100  02-07    Hemoglobin A1C:     LIVER FUNCTIONS - ( 07 Feb 2023 08:03 )  Alb: 3.6 g/dL / Pro: 7.5 g/dL / ALK PHOS: 100 U/L / ALT: 20 U/L DA / AST: 22 U/L / GGT: x           Vitamin B12         RADIOLOGY  ANALYSIS AND PLAN:  An 82-year-old with episode of unresponsiveness, left facial droop, and right-sided weakness.  Clinical impression is most likely TIA-like symptoms secondary to cerebral hypoperfusion.  The patient does have multiple areas of narrowing vessels, suspect less likely this was a true TIA due to the distribution of symptoms, left and right hemisphere, points towards a global process.  Telemetry evaluation as needed.  Monitor systolic blood pressure.  For history of mild cognitive impairment versus subtle dementia supportive treatment   For history of hyperlipidemia, continue the patient on statin.  For history of hypertension, monitor systolic blood pressures.  ams at night suspect form sundowning   neurologic  wise stable dc planning     Spoke with son, Bar, at bedside 2/6, telephone number [phone], his wife's name is Celeste, [phone] called both today went to voicemail  he does understand while in the hospital setting, she may become more disoriented.  Already, the patient is on mood stabilizer. seen with older son and wife yesterday     Greater than 45 minutes of time was spent with the patient, plan of care, reviewing data, with greater than 50% of the visit was spent counseling and/or coordinating care with multidisciplinary healthcare team

## 2023-02-08 NOTE — PROGRESS NOTE ADULT - ASSESSMENT
The patient is an 82 year old female with a history of HTN, DVT, anemia, anxiety, GERD who presents with an episode of unresponsiveness in the setting of TIA vs. syncope.    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

## 2023-02-08 NOTE — SOCIAL WORK PROGRESS NOTE - NSSWPROGRESSNOTE_GEN_ALL_CORE
Per discussion w/ inpatient interdisciplinary treatment team this AM, patient is medically cleared for transition to next level of care today.  spoke w/ Ruchi, Director of Case Management for [facility] in [place], @ [phone] to provide update on patient's progress w/ inpatient physical therapy dept this AM: patient walked 200ft w/ minAx1. [facility] Director stated that patient can return to the assisted living w/ home care physical therapy but would require 24/7 aide services to be in place.  conveyed this to patient's daughter-in-law Cathryn @ [phone] who stated that she is going to work on securing 24/7 aide for patient to return to the assisted living as daughter-IL does not feel patient will improve much in a sub-acute rehab facility, and daughter-IL also stated that she would outreach to [facility] Director to continue to make such plans.  notified [facility] Director of the above; will continue to follow.

## 2023-02-09 VITALS
SYSTOLIC BLOOD PRESSURE: 160 MMHG | OXYGEN SATURATION: 97 % | HEART RATE: 80 BPM | DIASTOLIC BLOOD PRESSURE: 80 MMHG | TEMPERATURE: 98 F | RESPIRATION RATE: 16 BRPM

## 2023-02-09 DIAGNOSIS — G47.00 INSOMNIA, UNSPECIFIED: ICD-10-CM

## 2023-02-09 DIAGNOSIS — F41.8 OTHER SPECIFIED ANXIETY DISORDERS: ICD-10-CM

## 2023-02-09 DIAGNOSIS — I10 ESSENTIAL (PRIMARY) HYPERTENSION: ICD-10-CM

## 2023-02-09 PROCEDURE — 97116 GAIT TRAINING THERAPY: CPT

## 2023-02-09 PROCEDURE — 97161 PT EVAL LOW COMPLEX 20 MIN: CPT

## 2023-02-09 PROCEDURE — 84484 ASSAY OF TROPONIN QUANT: CPT

## 2023-02-09 PROCEDURE — 93005 ELECTROCARDIOGRAM TRACING: CPT

## 2023-02-09 PROCEDURE — 70450 CT HEAD/BRAIN W/O DYE: CPT | Mod: MA

## 2023-02-09 PROCEDURE — 71045 X-RAY EXAM CHEST 1 VIEW: CPT

## 2023-02-09 PROCEDURE — 97535 SELF CARE MNGMENT TRAINING: CPT

## 2023-02-09 PROCEDURE — 85027 COMPLETE CBC AUTOMATED: CPT

## 2023-02-09 PROCEDURE — 92526 ORAL FUNCTION THERAPY: CPT

## 2023-02-09 PROCEDURE — 80061 LIPID PANEL: CPT

## 2023-02-09 PROCEDURE — 97129 THER IVNTJ 1ST 15 MIN: CPT

## 2023-02-09 PROCEDURE — 85025 COMPLETE CBC W/AUTO DIFF WBC: CPT

## 2023-02-09 PROCEDURE — 70498 CT ANGIOGRAPHY NECK: CPT | Mod: MA

## 2023-02-09 PROCEDURE — 92610 EVALUATE SWALLOWING FUNCTION: CPT

## 2023-02-09 PROCEDURE — 85730 THROMBOPLASTIN TIME PARTIAL: CPT

## 2023-02-09 PROCEDURE — 85610 PROTHROMBIN TIME: CPT

## 2023-02-09 PROCEDURE — 80053 COMPREHEN METABOLIC PANEL: CPT

## 2023-02-09 PROCEDURE — 97110 THERAPEUTIC EXERCISES: CPT

## 2023-02-09 PROCEDURE — 87637 SARSCOV2&INF A&B&RSV AMP PRB: CPT

## 2023-02-09 PROCEDURE — 99238 HOSP IP/OBS DSCHRG MGMT 30/<: CPT

## 2023-02-09 PROCEDURE — 99291 CRITICAL CARE FIRST HOUR: CPT | Mod: 25

## 2023-02-09 PROCEDURE — 87635 SARS-COV-2 COVID-19 AMP PRB: CPT

## 2023-02-09 PROCEDURE — 93306 TTE W/DOPPLER COMPLETE: CPT

## 2023-02-09 PROCEDURE — 36415 COLL VENOUS BLD VENIPUNCTURE: CPT

## 2023-02-09 PROCEDURE — 97165 OT EVAL LOW COMPLEX 30 MIN: CPT

## 2023-02-09 PROCEDURE — 70496 CT ANGIOGRAPHY HEAD: CPT | Mod: MA

## 2023-02-09 PROCEDURE — 97530 THERAPEUTIC ACTIVITIES: CPT

## 2023-02-09 PROCEDURE — 83036 HEMOGLOBIN GLYCOSYLATED A1C: CPT

## 2023-02-09 PROCEDURE — 82962 GLUCOSE BLOOD TEST: CPT

## 2023-02-09 RX ADMIN — ARIPIPRAZOLE 10 MILLIGRAM(S): 15 TABLET ORAL at 11:20

## 2023-02-09 RX ADMIN — FAMOTIDINE 20 MILLIGRAM(S): 10 INJECTION INTRAVENOUS at 11:20

## 2023-02-09 RX ADMIN — Medication 1 TABLET(S): at 11:21

## 2023-02-09 RX ADMIN — LOSARTAN POTASSIUM 50 MILLIGRAM(S): 100 TABLET, FILM COATED ORAL at 04:44

## 2023-02-09 RX ADMIN — Medication 0.25 MILLIGRAM(S): at 04:44

## 2023-02-09 RX ADMIN — Medication 81 MILLIGRAM(S): at 11:20

## 2023-02-09 RX ADMIN — Medication 25 MILLIGRAM(S): at 04:44

## 2023-02-09 RX ADMIN — POLYETHYLENE GLYCOL 3350 17 GRAM(S): 17 POWDER, FOR SOLUTION ORAL at 11:20

## 2023-02-09 RX ADMIN — RISPERIDONE 0.25 MILLIGRAM(S): 4 TABLET ORAL at 04:45

## 2023-02-09 NOTE — CAREGIVER ENGAGEMENT NOTE - CAREGIVER NAME
Patient's daughter-in-law aCthryn @ [phone]
Patient; patient's son Edward; patient's assisted living facility memory care unit director
Patient's assisted living facility director Ruchi via phone; patient's son @ bedside
[facility] assisted living facility staff

## 2023-02-09 NOTE — CASE MANAGEMENT PROGRESS NOTE - NSCMPROGRESSNOTE_GEN_ALL_CORE
RN/CM notified by MD that pt is medically optimized for DC today 02/09/2023. CM reached out to both assisted living facility [facility] staff [phone] and pt's family- sonBar and daughter-in-law, Cathryn re: above, IMM reviewed with family and agreeable with DC today. CM has confirmed with [facility] assisted living facility [phone] that they have received DC paperwork (attestation form, negative COVID results, PT report) that was faxed over to [phone] and they have confirmed that family has arranged for 24/7 private-hired care and assisted living facility is able to accept pt back today. CM confirmed with daughter-in-law, Cathryn that private-hired care will be in place by 12 noon today. CM has discussed with above about home care services/expectations, insurance provisions, choices of agencies. Family has deferred to assisted living facility for home care agency of choice.  [facility] assisted living facility staff has opted for [facility] [phone], CM has referred case, notified agency of DC today and confirmed acceptance- family and assisted living facility staff made aware. Family has requested that CM arrange for DC transport- CM reviewed insurance provisions/transportation letter. As per staff on unit, due to pt's current functional and cognitive status, pt could benefit from ambulance transport. CM sent referral to [facility] [phone] and requested for 12 PM pick-up today- staff on unit, assisted living facility and family made aware.

## 2023-02-09 NOTE — PROGRESS NOTE ADULT - SUBJECTIVE AND OBJECTIVE BOX
Chief Complaint: Unresponsiveness    Interval Events: No events overnight.    Review of Systems:  General: No fevers, chills, weight gain  Skin: No rashes, color changes  Cardiovascular: No chest pain, orthopnea  Respiratory: No shortness of breath, cough  Gastrointestinal: No nausea, abdominal pain  Genitourinary: No incontinence, pain with urination  Musculoskeletal: No pain, swelling, decreased range of motion  Neurological: No headache, weakness  Psychiatric: No depression, anxiety  Endocrine: No weight gain, increased thirst  All other systems are comprehensively negative.    Physical Exam:  Vital Signs Last 24 Hrs  T(C): 36.5 (09 Feb 2023 04:42), Max: 36.7 (08 Feb 2023 14:28)  T(F): 97.7 (09 Feb 2023 04:42), Max: 98.1 (09 Feb 2023 02:03)  HR: 80 (09 Feb 2023 06:52) (68 - 83)  BP: 179/69 (09 Feb 2023 06:52) (117/70 - 185/74)  BP(mean): --  RR: 18 (09 Feb 2023 04:42) (16 - 18)  SpO2: 95% (09 Feb 2023 04:42) (93% - 97%)  Parameters below as of 09 Feb 2023 04:42  Patient On (Oxygen Delivery Method): room air  General: NAD  HEENT: MMM  Neck: No JVD, no carotid bruit  Lungs: CTAB  CV: RRR, nl S1/S2, no M/R/G  Abdomen: S/NT/ND, +BS  Extremities: No LE edema, no cyanosis  Neuro: AAOx3, non-focal  Skin: No rash    Labs:                 ECG/Telemetry: Sinus rhythm

## 2023-02-09 NOTE — CAREGIVER ENGAGEMENT NOTE - CAREGIVER OUTREACH NOTES - FREE TEXT
RN/CM notified by MD that pt is medically optimized for transition back to [facility] today 02/09/2023. Pt has cognitive impairment and unable to participate in DC planning discussion. CM reached out to both assisted living facility [facility] staff [phone] and pt's family- son, Bar and daughter-in-law, Cathryn re: above, IMM reviewed with family and agreeable with DC today.

## 2023-02-09 NOTE — CAREGIVER ENGAGEMENT NOTE - CAREGIVER EDUCATION NOTES - FREE TEXT
&  met w/ patient @ bedside on unit [facility] for discussion of discharge planning, and patient deferred planning to her son Bar, therefore SW&CM also secured collateral information from patient's son & assisted living facility director. Prior to admission, patient was able to walk w/ her rolling walker w/o any additional assistance; per most recent physical therapy note, patient is currently walking 20' w/ RW & minAx1. [facility] Director stated that patient can be accepted back w/ additional hired care, which son told  he would be agreeable to pursue. Son identified that patient spent 100 sub-acute rehab days in [facility] in [place] & had a bad experience there, therefore his goal is to get patient back to her longterm on discharge. Will continue to follow.
Per discussion w/ inpatient interdisciplinary treatment team this AM, patient is medically cleared for transition to next level of care today.  received call from Ruchi, Director of Case Management for [facility], @ [phone] indicating that patient needs to be seen by inpatient physical therapy dept again prior to discharge back to assisted living in order to verify that she meets their functional requirements.  notified rehab dept supervisor who will have patient be seen tomorrow AM (02/08/2023). Son @ bedside aware as well.  &  to continue to follow.
CM reached out to both assisted living facility [facility] staff [phone] and pt's family- sonBar and daughter-in-law, Cathryn re: above, IMM reviewed with family and agreeable with DC today. CM has confirmed with [facility] assisted living facility [phone] that they have received DC paperwork (attestation form, negative COVID results, PT report) that was faxed over to [phone] and they have confirmed that family has arranged for 24/7 private-hired care and assisted living facility is able to accept pt back today. CM confirmed with daughter-in-law, Cathryn that private-hired care will be in place by 12 noon today. CM has discussed with above about home care services/expectations, insurance provisions, choices of agencies. Family has deferred to assisted living facility for home care agency of choice.  [facility] assisted living facility staff has opted for [facility] [phone], CM has referred case, notified agency of DC today and confirmed acceptance- family and assisted living facility staff made aware. Family has requested that CM arrange for DC transport- CM reviewed insurance provisions/transportation letter. As per staff on unit, due to pt's current functional and cognitive status, pt could benefit from ambulance transport. CM sent referral to [facility] [phone] and requested for 12 PM pick-up today- staff on unit, assisted living facility and family made aware. 
Per discussion w/ inpatient interdisciplinary treatment team this AM, patient is medically cleared for transition to next level of care today.  spoke w/ Ruchi, Director of Case Management for [facility] in [place], @ [phone] to provide update on patient's progress w/ inpatient physical therapy dept this AM: patient walked 200ft w/ minAx1. [facility] Director stated that patient can return to the assisted living w/ home care physical therapy but would require 24/7 aide services to be in place.  conveyed this to patient's daughter-in-law Cathryn @ [phone] who stated that she is going to work on securing 24/7 aide for patient to return to the assisted living as daughter-IL does not feel patient will improve much in a sub-acute rehab facility, and daughter-IL also stated that she would outreach to [facility] Director to continue to make such plans.  notified [facility] Director of the above; will continue to follow.

## 2023-02-09 NOTE — PROGRESS NOTE ADULT - SUBJECTIVE AND OBJECTIVE BOX
Neurology follow up note    GUNNER STEINBERG82yFemale      Interval History:    Patient feels ok no new complaints.    Allergies    No Known Allergies    Intolerances        MEDICATIONS    ALPRAZolam 0.25 milliGRAM(s) Oral two times a day  ARIPiprazole 10 milliGRAM(s) Oral daily  aspirin enteric coated 81 milliGRAM(s) Oral daily  atorvastatin 40 milliGRAM(s) Oral at bedtime  famotidine    Tablet 20 milliGRAM(s) Oral daily  losartan 50 milliGRAM(s) Oral daily  melatonin 5 milliGRAM(s) Oral at bedtime  metoprolol tartrate 25 milliGRAM(s) Oral two times a day  multivitamin/minerals 1 Tablet(s) Oral daily  polyethylene glycol 3350 17 Gram(s) Oral daily  risperiDONE   Tablet 0.25 milliGRAM(s) Oral two times a day              Vital Signs Last 24 Hrs  T(C): 36.5 (09 Feb 2023 04:42), Max: 36.7 (08 Feb 2023 14:28)  T(F): 97.7 (09 Feb 2023 04:42), Max: 98.1 (09 Feb 2023 02:03)  HR: 80 (09 Feb 2023 06:52) (68 - 83)  BP: 179/69 (09 Feb 2023 06:52) (117/70 - 185/74)  BP(mean): --  RR: 18 (09 Feb 2023 04:42) (16 - 18)  SpO2: 95% (09 Feb 2023 04:42) (93% - 97%)    Parameters below as of 09 Feb 2023 04:42  Patient On (Oxygen Delivery Method): room air    REVIEW OF SYSTEMS:  Constitutional:  The patient denies fever, chills, or night sweats.  Head:  No headaches.  Eyes:  No double vision or blurry vision.  Ears:  No ringing in the ears.  Neck:  No neck pain.  Cardiovascular: No chest pain.  Respiratory:  No shortness of breath.  Abdomen:  No nausea, vomiting, or abdominal pain.  Genitourinary:  No burning upon urination.  Extremities/Neurological:  No numbness or tingling.  Musculoskeletal:  No joint pain.    PHYSICAL EXAMINATION:   HEENT:  Head:  Normocephalic, atraumatic.  Eyes:  No scleral icterus.  Ears:  Hearing bilaterally intact.  NECK:  Supple.  CARDIOVASCULAR:  S1 and S2 heard.  RESPIRATORY:  Good air entry bilaterally.  ABDOMEN:  Soft and nontender.  EXTREMITIES:  No clubbing or cyanosis was noted.      NEUROLOGIC:  The patient awake and alert.  Location hospital.  Upon conversing with the patient, positive memory issues.  .  Extraocular movements were intact.  Full visual fields.  Speech was fluent.  Smile symmetric.  Motor:  Bilateral upper 4/5.  Bilateral lower 4-/5.  Sensory:  Bilateral upper and lower intact to light touch.  Uses a walker to ambulate with.      LABS:            Hemoglobin A1C:       Vitamin B12         RADIOLOGY    ANALYSIS AND PLAN:  An 82-year-old with episode of unresponsiveness, left facial droop, and right-sided weakness.  Clinical impression is most likely TIA-like symptoms secondary to cerebral hypoperfusion.  The patient does have multiple areas of narrowing vessels, suspect less likely this was a true TIA due to the distribution of symptoms, left and right hemisphere, points towards a global process.  Telemetry evaluation as needed.  Monitor systolic blood pressure.  For history of mild cognitive impairment versus subtle dementia supportive treatment   For history of hyperlipidemia, continue the patient on statin.  For history of hypertension, monitor systolic blood pressures.  ams at night suspect form sundowning   neurologic  wise stable dc planning     Spoke with sonBar, at bedside 2/6, telephone number [phone], his wife's name is Celeste, [phone] called both today went to voicemail  he does understand while in the hospital setting, she may become more disoriented.  Already, the patient is on mood stabilizer. seen with older son and wife yesterday     Greater than 45 minutes of time was spent with the patient, plan of care, reviewing data, with greater than 50% of the visit was spent counseling and/or coordinating care with multidisciplinary healthcare team

## 2023-02-09 NOTE — CAREGIVER ENGAGEMENT NOTE - CAREGIVER EDUCATION - TYPES DISCUSSED
After-care skilled tasks/Assisted living/Discharge plan/DME/Home Care Services/Insurance benefits/Post-acute care agency contact/Post-discharge escalation process/Transportation coordination/Transportation letter provided
After-care skilled tasks/Discharge plan/DME/Home Care Services/Insurance benefits/Post-acute care agency contact/Post-discharge escalation process/Transportation coordination/Transportation letter provided
After-care skilled tasks/Discharge plan/DME/Home Care Services/Insurance benefits/Post-acute care agency contact/Post-discharge escalation process/Transportation coordination/Transportation letter provided
Assisted living/Discharge plan/Home Care Services/Transportation coordination/Transportation letter provided

## 2023-02-13 ENCOUNTER — INPATIENT (INPATIENT)
Facility: HOSPITAL | Age: 83
LOS: 6 days | Discharge: NURSING, REHAB OR LONG-TERM CARE | DRG: 884 | End: 2023-02-20
Attending: STUDENT IN AN ORGANIZED HEALTH CARE EDUCATION/TRAINING PROGRAM | Admitting: STUDENT IN AN ORGANIZED HEALTH CARE EDUCATION/TRAINING PROGRAM
Payer: MEDICARE

## 2023-02-13 VITALS
SYSTOLIC BLOOD PRESSURE: 126 MMHG | HEIGHT: 62 IN | HEART RATE: 70 BPM | WEIGHT: 139.99 LBS | DIASTOLIC BLOOD PRESSURE: 60 MMHG | TEMPERATURE: 98 F | OXYGEN SATURATION: 99 % | RESPIRATION RATE: 14 BRPM

## 2023-02-13 DIAGNOSIS — R55 SYNCOPE AND COLLAPSE: ICD-10-CM

## 2023-02-13 LAB
ALBUMIN SERPL ELPH-MCNC: 3.3 G/DL — SIGNIFICANT CHANGE UP (ref 3.3–5)
ALP SERPL-CCNC: 97 U/L — SIGNIFICANT CHANGE UP (ref 30–120)
ALT FLD-CCNC: 23 U/L DA — SIGNIFICANT CHANGE UP (ref 10–60)
ANION GAP SERPL CALC-SCNC: 8 MMOL/L — SIGNIFICANT CHANGE UP (ref 5–17)
APPEARANCE UR: CLEAR — SIGNIFICANT CHANGE UP
APTT BLD: 37.2 SEC — HIGH (ref 27.5–35.5)
AST SERPL-CCNC: 20 U/L — SIGNIFICANT CHANGE UP (ref 10–40)
BACTERIA # UR AUTO: NEGATIVE — SIGNIFICANT CHANGE UP
BASOPHILS # BLD AUTO: 0.02 K/UL — SIGNIFICANT CHANGE UP (ref 0–0.2)
BASOPHILS NFR BLD AUTO: 0.2 % — SIGNIFICANT CHANGE UP (ref 0–2)
BILIRUB SERPL-MCNC: 0.4 MG/DL — SIGNIFICANT CHANGE UP (ref 0.2–1.2)
BILIRUB UR-MCNC: NEGATIVE — SIGNIFICANT CHANGE UP
BUN SERPL-MCNC: 19 MG/DL — SIGNIFICANT CHANGE UP (ref 7–23)
CALCIUM SERPL-MCNC: 9.2 MG/DL — SIGNIFICANT CHANGE UP (ref 8.4–10.5)
CHLORIDE SERPL-SCNC: 107 MMOL/L — SIGNIFICANT CHANGE UP (ref 96–108)
CO2 SERPL-SCNC: 28 MMOL/L — SIGNIFICANT CHANGE UP (ref 22–31)
COLOR SPEC: YELLOW — SIGNIFICANT CHANGE UP
CREAT SERPL-MCNC: 0.95 MG/DL — SIGNIFICANT CHANGE UP (ref 0.5–1.3)
DIFF PNL FLD: NEGATIVE — SIGNIFICANT CHANGE UP
EGFR: 59 ML/MIN/1.73M2 — LOW
EOSINOPHIL # BLD AUTO: 0.16 K/UL — SIGNIFICANT CHANGE UP (ref 0–0.5)
EOSINOPHIL NFR BLD AUTO: 1.6 % — SIGNIFICANT CHANGE UP (ref 0–6)
EPI CELLS # UR: SIGNIFICANT CHANGE UP
GLUCOSE SERPL-MCNC: 128 MG/DL — HIGH (ref 70–99)
GLUCOSE UR QL: NEGATIVE MG/DL — SIGNIFICANT CHANGE UP
HCT VFR BLD CALC: 40.2 % — SIGNIFICANT CHANGE UP (ref 34.5–45)
HGB BLD-MCNC: 13 G/DL — SIGNIFICANT CHANGE UP (ref 11.5–15.5)
IMM GRANULOCYTES NFR BLD AUTO: 0.5 % — SIGNIFICANT CHANGE UP (ref 0–0.9)
INR BLD: 1.13 RATIO — SIGNIFICANT CHANGE UP (ref 0.88–1.16)
KETONES UR-MCNC: NEGATIVE — SIGNIFICANT CHANGE UP
LEUKOCYTE ESTERASE UR-ACNC: ABNORMAL
LYMPHOCYTES # BLD AUTO: 1.03 K/UL — SIGNIFICANT CHANGE UP (ref 1–3.3)
LYMPHOCYTES # BLD AUTO: 10.4 % — LOW (ref 13–44)
MCHC RBC-ENTMCNC: 31.3 PG — SIGNIFICANT CHANGE UP (ref 27–34)
MCHC RBC-ENTMCNC: 32.3 GM/DL — SIGNIFICANT CHANGE UP (ref 32–36)
MCV RBC AUTO: 96.9 FL — SIGNIFICANT CHANGE UP (ref 80–100)
MONOCYTES # BLD AUTO: 0.59 K/UL — SIGNIFICANT CHANGE UP (ref 0–0.9)
MONOCYTES NFR BLD AUTO: 5.9 % — SIGNIFICANT CHANGE UP (ref 2–14)
NEUTROPHILS # BLD AUTO: 8.1 K/UL — HIGH (ref 1.8–7.4)
NEUTROPHILS NFR BLD AUTO: 81.4 % — HIGH (ref 43–77)
NITRITE UR-MCNC: NEGATIVE — SIGNIFICANT CHANGE UP
NRBC # BLD: 0 /100 WBCS — SIGNIFICANT CHANGE UP (ref 0–0)
PH UR: 7 — SIGNIFICANT CHANGE UP (ref 5–8)
PLATELET # BLD AUTO: 232 K/UL — SIGNIFICANT CHANGE UP (ref 150–400)
POTASSIUM SERPL-MCNC: 3.6 MMOL/L — SIGNIFICANT CHANGE UP (ref 3.5–5.3)
POTASSIUM SERPL-SCNC: 3.6 MMOL/L — SIGNIFICANT CHANGE UP (ref 3.5–5.3)
PROT SERPL-MCNC: 7 G/DL — SIGNIFICANT CHANGE UP (ref 6–8.3)
PROT UR-MCNC: 30 MG/DL
PROTHROM AB SERPL-ACNC: 13 SEC — SIGNIFICANT CHANGE UP (ref 10.5–13.4)
RBC # BLD: 4.15 M/UL — SIGNIFICANT CHANGE UP (ref 3.8–5.2)
RBC # FLD: 13.2 % — SIGNIFICANT CHANGE UP (ref 10.3–14.5)
RBC CASTS # UR COMP ASSIST: NEGATIVE /HPF — SIGNIFICANT CHANGE UP (ref 0–4)
SARS-COV-2 RNA SPEC QL NAA+PROBE: SIGNIFICANT CHANGE UP
SODIUM SERPL-SCNC: 143 MMOL/L — SIGNIFICANT CHANGE UP (ref 135–145)
SP GR SPEC: 1.01 — SIGNIFICANT CHANGE UP (ref 1.01–1.02)
TROPONIN I, HIGH SENSITIVITY RESULT: 65.9 NG/L — HIGH
UROBILINOGEN FLD QL: NEGATIVE MG/DL — SIGNIFICANT CHANGE UP
WBC # BLD: 9.95 K/UL — SIGNIFICANT CHANGE UP (ref 3.8–10.5)
WBC # FLD AUTO: 9.95 K/UL — SIGNIFICANT CHANGE UP (ref 3.8–10.5)
WBC UR QL: SIGNIFICANT CHANGE UP

## 2023-02-13 PROCEDURE — 71045 X-RAY EXAM CHEST 1 VIEW: CPT | Mod: 26

## 2023-02-13 PROCEDURE — 99285 EMERGENCY DEPT VISIT HI MDM: CPT

## 2023-02-13 PROCEDURE — 93010 ELECTROCARDIOGRAM REPORT: CPT

## 2023-02-13 PROCEDURE — 99223 1ST HOSP IP/OBS HIGH 75: CPT | Mod: AI

## 2023-02-13 PROCEDURE — 70450 CT HEAD/BRAIN W/O DYE: CPT | Mod: 26,MA

## 2023-02-13 RX ORDER — FAMOTIDINE 10 MG/ML
20 INJECTION INTRAVENOUS DAILY
Refills: 0 | Status: DISCONTINUED | OUTPATIENT
Start: 2023-02-13 | End: 2023-02-20

## 2023-02-13 RX ORDER — METOPROLOL TARTRATE 50 MG
25 TABLET ORAL
Refills: 0 | Status: DISCONTINUED | OUTPATIENT
Start: 2023-02-13 | End: 2023-02-20

## 2023-02-13 RX ORDER — ARIPIPRAZOLE 15 MG/1
10 TABLET ORAL DAILY
Refills: 0 | Status: DISCONTINUED | OUTPATIENT
Start: 2023-02-13 | End: 2023-02-14

## 2023-02-13 RX ORDER — LANOLIN ALCOHOL/MO/W.PET/CERES
5 CREAM (GRAM) TOPICAL AT BEDTIME
Refills: 0 | Status: DISCONTINUED | OUTPATIENT
Start: 2023-02-13 | End: 2023-02-20

## 2023-02-13 RX ORDER — SODIUM CHLORIDE 9 MG/ML
1000 INJECTION INTRAMUSCULAR; INTRAVENOUS; SUBCUTANEOUS ONCE
Refills: 0 | Status: COMPLETED | OUTPATIENT
Start: 2023-02-13 | End: 2023-02-13

## 2023-02-13 RX ORDER — ACETAMINOPHEN 500 MG
650 TABLET ORAL EVERY 6 HOURS
Refills: 0 | Status: DISCONTINUED | OUTPATIENT
Start: 2023-02-13 | End: 2023-02-20

## 2023-02-13 RX ORDER — QUETIAPINE FUMARATE 200 MG/1
50 TABLET, FILM COATED ORAL AT BEDTIME
Refills: 0 | Status: DISCONTINUED | OUTPATIENT
Start: 2023-02-13 | End: 2023-02-14

## 2023-02-13 RX ORDER — LOSARTAN POTASSIUM 100 MG/1
50 TABLET, FILM COATED ORAL DAILY
Refills: 0 | Status: DISCONTINUED | OUTPATIENT
Start: 2023-02-13 | End: 2023-02-15

## 2023-02-13 RX ORDER — POLYETHYLENE GLYCOL 3350 17 G/17G
17 POWDER, FOR SOLUTION ORAL DAILY
Refills: 0 | Status: DISCONTINUED | OUTPATIENT
Start: 2023-02-13 | End: 2023-02-20

## 2023-02-13 RX ORDER — ENOXAPARIN SODIUM 100 MG/ML
40 INJECTION SUBCUTANEOUS EVERY 24 HOURS
Refills: 0 | Status: DISCONTINUED | OUTPATIENT
Start: 2023-02-13 | End: 2023-02-20

## 2023-02-13 RX ORDER — RISPERIDONE 4 MG/1
0.25 TABLET ORAL
Refills: 0 | Status: DISCONTINUED | OUTPATIENT
Start: 2023-02-13 | End: 2023-02-14

## 2023-02-13 RX ORDER — ATORVASTATIN CALCIUM 80 MG/1
20 TABLET, FILM COATED ORAL AT BEDTIME
Refills: 0 | Status: DISCONTINUED | OUTPATIENT
Start: 2023-02-13 | End: 2023-02-14

## 2023-02-13 RX ADMIN — SODIUM CHLORIDE 1000 MILLILITER(S): 9 INJECTION INTRAMUSCULAR; INTRAVENOUS; SUBCUTANEOUS at 13:04

## 2023-02-13 RX ADMIN — ARIPIPRAZOLE 10 MILLIGRAM(S): 15 TABLET ORAL at 21:41

## 2023-02-13 RX ADMIN — ATORVASTATIN CALCIUM 20 MILLIGRAM(S): 80 TABLET, FILM COATED ORAL at 21:40

## 2023-02-13 RX ADMIN — RISPERIDONE 0.25 MILLIGRAM(S): 4 TABLET ORAL at 18:40

## 2023-02-13 RX ADMIN — Medication 5 MILLIGRAM(S): at 21:40

## 2023-02-13 RX ADMIN — LOSARTAN POTASSIUM 50 MILLIGRAM(S): 100 TABLET, FILM COATED ORAL at 21:41

## 2023-02-13 RX ADMIN — ENOXAPARIN SODIUM 40 MILLIGRAM(S): 100 INJECTION SUBCUTANEOUS at 18:40

## 2023-02-13 RX ADMIN — QUETIAPINE FUMARATE 50 MILLIGRAM(S): 200 TABLET, FILM COATED ORAL at 21:40

## 2023-02-13 RX ADMIN — Medication 25 MILLIGRAM(S): at 18:40

## 2023-02-13 RX ADMIN — FAMOTIDINE 20 MILLIGRAM(S): 10 INJECTION INTRAVENOUS at 21:41

## 2023-02-13 RX ADMIN — SODIUM CHLORIDE 1000 MILLILITER(S): 9 INJECTION INTRAMUSCULAR; INTRAVENOUS; SUBCUTANEOUS at 14:04

## 2023-02-13 NOTE — H&P ADULT - ASSESSMENT
82F Dementia, HTN, Anxiety and GERD admitted for Dementia with Behavioral Disturbances and Bradycardia    Bradycardia / HTN  Currently in Sinus with HR in 60-70 range   Echo on last admission done with no significant valve pathology and normal EF   Will monitor on Telemetry  No dose adjustments needed on Metoprolol at this time  Cardiology Consult noted     Dementia with Behavioral Disturbances  Risperidol + Abilify   Seroquel 50mg at night   Hold on Xanax   Will need to rule out UTI so pending UA  Consult Psych     GERD  Famotidine     History of TIA  Aspirin + Statin     Diet  Regular    DVT Prophylaxis  Lovenox    Disposition   Discharge planning pending hospital course

## 2023-02-13 NOTE — CONSULT NOTE ADULT - SUBJECTIVE AND OBJECTIVE BOX
History of Present Illness: The patient is an 82 year old female with a history of HTN, DVT, anemia, anxiety, GERD, dementia who presents with unresponsiveness. The patient is a poor historian. She was recently admitted for a similar episode. As per daughter, she was told by facility that the patient became unresponsive again. She was also noted to be confused. Her medications have recently been changed including the aripiprazole.    Past Medical/Surgical History:  HTN, DVT, anemia, anxiety, GERD, dementia     Medications:  Home Medications:  ALPRAZolam 0.25 mg oral tablet: orally 2 times a day (05 Feb 2023 15:08)  ARIPiprazole 10 mg oral tablet: 1 tab(s) orally once a day (05 Feb 2023 16:19)  atorvastatin 20 mg oral tablet: 1 tab(s) orally once a day (05 Feb 2023 15:08)  Calcium 600+D 600 mg-5 mcg (200 intl units) oral tablet: orally 2 times a day (05 Feb 2023 15:08)  cyanocobalamin 1000 mcg/mL injectable solution: 1000 microgram(s) injectable every 4 weeks (05 Feb 2023 16:19)  famotidine 20 mg oral tablet: orally once a day (05 Feb 2023 15:08)  folic acid 1 mg oral tablet: 1 tab(s) orally once a day (05 Feb 2023 15:08)  losartan 50 mg oral tablet: 1 tab(s) orally once a day (05 Feb 2023 15:08)  Melatonin 5 mg oral tablet: 1 tab(s) orally once a day (at bedtime) (05 Feb 2023 15:08)  metoprolol tartrate 25 mg oral tablet: 1 tab(s) orally 2 times a day (05 Feb 2023 15:08)  polyethylene glycol 3350 oral powder for reconstitution: orally once a day (05 Feb 2023 15:08)  PreserVision AREDS oral capsule: orally once a day (05 Feb 2023 15:08)  risperiDONE 0.25 mg oral tablet: 1 tab(s) orally 2 times a day (05 Feb 2023 15:08)  Vitamin B12 1000 mcg oral tablet: 1 tab(s) orally once a day (05 Feb 2023 15:08)  Vitamin D3 50 mcg (2000 intl units) oral tablet: 1 tab(s) orally once a day (05 Feb 2023 15:08)      Family History: Non-contributory family history of premature cardiovascular atherosclerotic disease    Social History: No tobacco, alcohol or drug use    Review of Systems:  General: No fevers, chills, weight gain  Skin: No rashes, color changes  Cardiovascular: No chest pain, orthopnea  Respiratory: No shortness of breath, cough  Gastrointestinal: No nausea, abdominal pain  Genitourinary: No incontinence, pain with urination  Musculoskeletal: No pain, swelling, decreased range of motion  Neurological: No headache, weakness  Psychiatric: No depression, anxiety  Endocrine: No weight gain, increased thirst  All other systems are comprehensively negative.    Physical Exam:  Vitals:        Vital Signs Last 24 Hrs  T(C): 36.8 (13 Feb 2023 11:46), Max: 36.8 (13 Feb 2023 11:46)  T(F): 98.3 (13 Feb 2023 11:46), Max: 98.3 (13 Feb 2023 11:46)  HR: 52 (13 Feb 2023 14:42) (50 - 70)  BP: 138/62 (13 Feb 2023 14:42) (122/66 - 138/62)  BP(mean): --  RR: 15 (13 Feb 2023 14:42) (14 - 15)  SpO2: 99% (13 Feb 2023 14:42) (99% - 99%)  Parameters below as of 13 Feb 2023 14:42  Patient On (Oxygen Delivery Method): room air  General: NAD  HEENT: MMM  Neck: No JVD, no carotid bruit  Lungs: CTAB  CV: RRR, nl S1/S2, no M/R/G  Abdomen: S/NT/ND, +BS  Extremities: No LE edema, no cyanosis  Neuro: AAOx3, non-focal  Skin: No rash    Labs:                        13.0   9.95  )-----------( 232      ( 13 Feb 2023 13:02 )             40.2     02-13    143  |  107  |  19  ----------------------------<  128<H>  3.6   |  28  |  0.95    Ca    9.2      13 Feb 2023 13:02    TPro  7.0  /  Alb  3.3  /  TBili  0.4  /  DBili  x   /  AST  20  /  ALT  23  /  AlkPhos  97  02-13        PT/INR - ( 13 Feb 2023 13:02 )   PT: 13.0 sec;   INR: 1.13 ratio         PTT - ( 13 Feb 2023 13:02 )  PTT:37.2 sec    ECG/Telemetry: Sinus bradycardia, PAC, nonspecific ST abnormality

## 2023-02-13 NOTE — PATIENT PROFILE ADULT - FALL HARM RISK - HARM RISK INTERVENTIONS
Assistance with ambulation/Assistance OOB with selected safe patient handling equipment/Communicate Risk of Fall with Harm to all staff/Discuss with provider need for PT consult/Monitor gait and stability/Provide patient with walking aids - walker, cane, crutches/Reinforce activity limits and safety measures with patient and family/Tailored Fall Risk Interventions/Use of alarms - bed, chair and/or voice tab/Visual Cue: Yellow wristband and red socks/Bed in lowest position, wheels locked, appropriate side rails in place/Call bell, personal items and telephone in reach/Instruct patient to call for assistance before getting out of bed or chair/Non-slip footwear when patient is out of bed/[place] to call system/Physically safe environment - no spills, clutter or unnecessary equipment/Purposeful Proactive Rounding/Room/bathroom lighting operational, light cord in reach

## 2023-02-13 NOTE — ED ADULT NURSE REASSESSMENT NOTE - NS ED NURSE REASSESS COMMENT FT1
RN from the [facility] contact this RN and stated that pt was unresponsive at the facility and that is why she was risa in. Pt remains lethargic but is responsive to voice. Pt remains bradycardic on the cm.  aware

## 2023-02-13 NOTE — ED PROVIDER NOTE - CARE PLAN
1 Principal Discharge DX:	Syncope  Secondary Diagnosis:	Dementia  Secondary Diagnosis:	Bradycardia

## 2023-02-13 NOTE — PATIENT PROFILE ADULT - FUNCTIONAL ASSESSMENT - BASIC MOBILITY 6.
2-calculated by average/Not able to assess (calculate score using [facility] averaging method)

## 2023-02-13 NOTE — ED ADULT NURSE NOTE - NSIMPLEMENTINTERV_GEN_ALL_ED
Implemented All Fall Risk Interventions:  [place] to call system. Call bell, personal items and telephone within reach. Instruct patient to call for assistance. Room bathroom lighting operational. Non-slip footwear when patient is off stretcher. Physically safe environment: no spills, clutter or unnecessary equipment. Stretcher in lowest position, wheels locked, appropriate side rails in place. Provide visual cue, wrist band, yellow gown, etc. Monitor gait and stability. Monitor for mental status changes and reorient to person, place, and time. Review medications for side effects contributing to fall risk. Reinforce activity limits and safety measures with patient and family.

## 2023-02-13 NOTE — ED ADULT NURSE NOTE - OBJECTIVE STATEMENT
Pt is alert and confused. Pt unsure of the year, president, and current location. Pt knows her name and . Pt states that she is in an ambulance and doesn't know why she is here. As per EMS, pt was sent from the [facility] for worsening confusion and lethargy. Pt has a hx of Dementia. Pt bradycardic on the monitor. DO aware. Tele tach notified that pt placed on cm. Pt denies sob, chest pain, nausea, vomiting, and dizziness. Pt resp are even and unlabored, skin color jarvis for race. Pt updated on plan of care. Pt is alert and confused. Pt unsure of the year, president, and current location. Pt knows her name. Pt states that she is in an ambulance and doesn't know why she is here. As per EMS, pt was sent from the [facility] for worsening confusion and lethargy. Pt has a hx of Dementia. Pt bradycardic on the monitor. DO aware. Tele tach notified that pt placed on cm. Pt denies sob, chest pain, nausea, vomiting, and dizziness. Pt resp are even and unlabored, skin color jarvis for race. Pt updated on plan of care.

## 2023-02-13 NOTE — H&P ADULT - HISTORY OF PRESENT ILLNESS
82F Dementia, HTN, Anxiety and GERD brought in from the [place] after being found today slumped in her chair and minimally responsive.  Patient unclear of why she's here and confused overall. Most of the history obtained by the daughter at the bedside. She was admitted here few weeks prior for presumed stroke and had an extensive workup that was negative.  Facility reported that patient was also bradycardic at the time.  No transfer sheet to review the events or any other data. Currently patient is in bed, confused but comfortable with HR in the 70's. Daughter reported that facility has been changing the medications in attempts for better behavioral control due to underlying dementia and has been increasing doseages. Appears Xanax was recently started.      Repeat CT imaging done today of head which is unchanged from prior.

## 2023-02-13 NOTE — ED PROVIDER NOTE - EYES, MLM
Clear bilaterally, pupils equal, round and reactive to light. normal rate and rhythm, no chest pain and no edema.

## 2023-02-13 NOTE — H&P ADULT - NSHPLABSRESULTS_GEN_ALL_CORE
Labs:                          13.0   9.95  )-----------( 232      ( 13 Feb 2023 13:02 )             40.2       02-13    143  |  107  |  19  ----------------------------<  128<H>  3.6   |  28  |  0.95    Ca    9.2      13 Feb 2023 13:02    TPro  7.0  /  Alb  3.3  /  TBili  0.4  /  DBili  x   /  AST  20  /  ALT  23  /  AlkPhos  97  02-13                  PT/INR - ( 13 Feb 2023 13:02 )   PT: 13.0 sec;   INR: 1.13 ratio         PTT - ( 13 Feb 2023 13:02 )  PTT:37.2 sec    Lactate Trend            CAPILLARY BLOOD GLUCOSE          EKG:   Personally Reviewed:  [ ] YES     Imaging:   Personally Reviewed:  [ ] YES

## 2023-02-13 NOTE — ED PROVIDER NOTE - CLINICAL SUMMARY MEDICAL DECISION MAKING FREE TEXT BOX
82-year-old female with history of hypertension, hypocalcemia, anemia, GERD, anxiety, DVT presents by EMS from the [facility].  Patient reportedly More lethargic and weaker than usual today since waking up this morning.  Patient with reported history of dementia, unable to get additional history.  Of note patient was recently hospitalized for sudden onset of weakness and had full stroke work-up which was unrevealing.  Physical exam today reveals only lethargy otherwise nonfocal.  Impression is weakness confusion AMS likely related to dementia but will rule out acute infection or electrolyte imbalance.  Plan is labs urine CT brain EKG.  May need admission for further evaluation and treatment.

## 2023-02-13 NOTE — CONSULT NOTE ADULT - ASSESSMENT
The patient is an 82 year old female with a history of HTN, DVT, anemia, anxiety, GERD, dementia who presents with unresponsiveness.    Plan:  - Unclear if true syncope vs. AMS  - Troponin borderline elevated at 65 in the setting of demand ischemia from possible syncope  - Echo last admission with normal LV systolic function, no significant valve issues  - Monitor on telemetry  - CT head with multiple old lacunar infarcts  - Continue losartan 50 mg daily  - Continue metoprolol tartrate 25 mg bid  - Increase atorvastatin to 40 mg daily  - Would start aspirin 81 mg daily (this was not added to her discharge medications last admission)  - May need adjustment of antipsychotic medications  - Neurology eval

## 2023-02-13 NOTE — H&P ADULT - NSHPPHYSICALEXAM_GEN_ALL_CORE
PHYSICAL EXAM:  Vital Signs Last 24 Hrs  T(C): 36.8 (13 Feb 2023 11:46), Max: 36.8 (13 Feb 2023 11:46)  T(F): 98.3 (13 Feb 2023 11:46), Max: 98.3 (13 Feb 2023 11:46)  HR: 52 (13 Feb 2023 14:42) (50 - 70)  BP: 138/62 (13 Feb 2023 14:42) (122/66 - 138/62)  BP(mean): --  RR: 15 (13 Feb 2023 14:42) (14 - 15)  SpO2: 99% (13 Feb 2023 14:42) (99% - 99%)    Parameters below as of 13 Feb 2023 14:42  Patient On (Oxygen Delivery Method): room air        GENERAL: NAD  HEAD:  Atraumatic, Normocephalic  ENMT: Nick Mucous Membranes  NECK: Supple, No JVD, Normal thyroid  HEART: Regular rate and rhythm; No murmurs, rubs, or gallops  CHEST/LUNG: Clear to auscultation bilaterally; No rales, rhonchi, wheezing, or rubs  ABDOMEN: Soft, Nontender, Nondistended; Bowel sounds present  EXTREMITIES:  2+ Peripheral Pulses, No clubbing, cyanosis, or edema  SKIN: No rashes or lesions

## 2023-02-13 NOTE — ED PROVIDER NOTE - OBJECTIVE STATEMENT
82-year-old female with history of hypertension, hypocalcemia, anemia, GERD, anxiety, DVT presents by EMS from the [facility].  Patient reportedly More lethargic and weaker than usual today since waking up this morning.  Patient with reported history of dementia, unable to get additional history.  Of note patient was recently hospitalized for sudden onset of weakness and had full stroke work-up which was unrevealing.

## 2023-02-14 ENCOUNTER — TRANSCRIPTION ENCOUNTER (OUTPATIENT)
Age: 83
End: 2023-02-14

## 2023-02-14 LAB
ALBUMIN SERPL ELPH-MCNC: 2.9 G/DL — LOW (ref 3.3–5)
ALP SERPL-CCNC: 87 U/L — SIGNIFICANT CHANGE UP (ref 30–120)
ALT FLD-CCNC: 19 U/L DA — SIGNIFICANT CHANGE UP (ref 10–60)
ANION GAP SERPL CALC-SCNC: 10 MMOL/L — SIGNIFICANT CHANGE UP (ref 5–17)
AST SERPL-CCNC: 18 U/L — SIGNIFICANT CHANGE UP (ref 10–40)
BASOPHILS # BLD AUTO: 0.03 K/UL — SIGNIFICANT CHANGE UP (ref 0–0.2)
BASOPHILS NFR BLD AUTO: 0.5 % — SIGNIFICANT CHANGE UP (ref 0–2)
BILIRUB SERPL-MCNC: 0.5 MG/DL — SIGNIFICANT CHANGE UP (ref 0.2–1.2)
BUN SERPL-MCNC: 13 MG/DL — SIGNIFICANT CHANGE UP (ref 7–23)
CALCIUM SERPL-MCNC: 8.7 MG/DL — SIGNIFICANT CHANGE UP (ref 8.4–10.5)
CHLORIDE SERPL-SCNC: 108 MMOL/L — SIGNIFICANT CHANGE UP (ref 96–108)
CO2 SERPL-SCNC: 24 MMOL/L — SIGNIFICANT CHANGE UP (ref 22–31)
CREAT SERPL-MCNC: 0.57 MG/DL — SIGNIFICANT CHANGE UP (ref 0.5–1.3)
EGFR: 90 ML/MIN/1.73M2 — SIGNIFICANT CHANGE UP
EOSINOPHIL # BLD AUTO: 0.4 K/UL — SIGNIFICANT CHANGE UP (ref 0–0.5)
EOSINOPHIL NFR BLD AUTO: 6.1 % — HIGH (ref 0–6)
GLUCOSE SERPL-MCNC: 93 MG/DL — SIGNIFICANT CHANGE UP (ref 70–99)
HCT VFR BLD CALC: 40.7 % — SIGNIFICANT CHANGE UP (ref 34.5–45)
HGB BLD-MCNC: 13 G/DL — SIGNIFICANT CHANGE UP (ref 11.5–15.5)
IMM GRANULOCYTES NFR BLD AUTO: 0.6 % — SIGNIFICANT CHANGE UP (ref 0–0.9)
LYMPHOCYTES # BLD AUTO: 1.92 K/UL — SIGNIFICANT CHANGE UP (ref 1–3.3)
LYMPHOCYTES # BLD AUTO: 29.1 % — SIGNIFICANT CHANGE UP (ref 13–44)
MCHC RBC-ENTMCNC: 30.6 PG — SIGNIFICANT CHANGE UP (ref 27–34)
MCHC RBC-ENTMCNC: 31.9 GM/DL — LOW (ref 32–36)
MCV RBC AUTO: 95.8 FL — SIGNIFICANT CHANGE UP (ref 80–100)
MONOCYTES # BLD AUTO: 0.54 K/UL — SIGNIFICANT CHANGE UP (ref 0–0.9)
MONOCYTES NFR BLD AUTO: 8.2 % — SIGNIFICANT CHANGE UP (ref 2–14)
NEUTROPHILS # BLD AUTO: 3.67 K/UL — SIGNIFICANT CHANGE UP (ref 1.8–7.4)
NEUTROPHILS NFR BLD AUTO: 55.5 % — SIGNIFICANT CHANGE UP (ref 43–77)
NRBC # BLD: 0 /100 WBCS — SIGNIFICANT CHANGE UP (ref 0–0)
PLATELET # BLD AUTO: 218 K/UL — SIGNIFICANT CHANGE UP (ref 150–400)
POTASSIUM SERPL-MCNC: 3.4 MMOL/L — LOW (ref 3.5–5.3)
POTASSIUM SERPL-SCNC: 3.4 MMOL/L — LOW (ref 3.5–5.3)
PROT SERPL-MCNC: 6.5 G/DL — SIGNIFICANT CHANGE UP (ref 6–8.3)
RBC # BLD: 4.25 M/UL — SIGNIFICANT CHANGE UP (ref 3.8–5.2)
RBC # FLD: 12.9 % — SIGNIFICANT CHANGE UP (ref 10.3–14.5)
SODIUM SERPL-SCNC: 142 MMOL/L — SIGNIFICANT CHANGE UP (ref 135–145)
WBC # BLD: 6.6 K/UL — SIGNIFICANT CHANGE UP (ref 3.8–10.5)
WBC # FLD AUTO: 6.6 K/UL — SIGNIFICANT CHANGE UP (ref 3.8–10.5)

## 2023-02-14 PROCEDURE — 99221 1ST HOSP IP/OBS SF/LOW 40: CPT

## 2023-02-14 PROCEDURE — 99233 SBSQ HOSP IP/OBS HIGH 50: CPT

## 2023-02-14 RX ORDER — ASPIRIN/CALCIUM CARB/MAGNESIUM 324 MG
81 TABLET ORAL DAILY
Refills: 0 | Status: DISCONTINUED | OUTPATIENT
Start: 2023-02-14 | End: 2023-02-20

## 2023-02-14 RX ORDER — POTASSIUM CHLORIDE 20 MEQ
40 PACKET (EA) ORAL ONCE
Refills: 0 | Status: COMPLETED | OUTPATIENT
Start: 2023-02-14 | End: 2023-02-14

## 2023-02-14 RX ORDER — RISPERIDONE 4 MG/1
0.5 TABLET ORAL
Refills: 0 | Status: DISCONTINUED | OUTPATIENT
Start: 2023-02-14 | End: 2023-02-20

## 2023-02-14 RX ORDER — TRAZODONE HCL 50 MG
50 TABLET ORAL AT BEDTIME
Refills: 0 | Status: DISCONTINUED | OUTPATIENT
Start: 2023-02-14 | End: 2023-02-16

## 2023-02-14 RX ORDER — IPRATROPIUM/ALBUTEROL SULFATE 18-103MCG
3 AEROSOL WITH ADAPTER (GRAM) INHALATION ONCE
Refills: 0 | Status: DISCONTINUED | OUTPATIENT
Start: 2023-02-14 | End: 2023-02-14

## 2023-02-14 RX ORDER — ATORVASTATIN CALCIUM 80 MG/1
40 TABLET, FILM COATED ORAL AT BEDTIME
Refills: 0 | Status: DISCONTINUED | OUTPATIENT
Start: 2023-02-14 | End: 2023-02-20

## 2023-02-14 RX ADMIN — Medication 81 MILLIGRAM(S): at 13:00

## 2023-02-14 RX ADMIN — Medication 25 MILLIGRAM(S): at 05:40

## 2023-02-14 RX ADMIN — RISPERIDONE 0.5 MILLIGRAM(S): 4 TABLET ORAL at 17:15

## 2023-02-14 RX ADMIN — FAMOTIDINE 20 MILLIGRAM(S): 10 INJECTION INTRAVENOUS at 13:01

## 2023-02-14 RX ADMIN — Medication 25 MILLIGRAM(S): at 17:16

## 2023-02-14 RX ADMIN — LOSARTAN POTASSIUM 50 MILLIGRAM(S): 100 TABLET, FILM COATED ORAL at 05:41

## 2023-02-14 RX ADMIN — ENOXAPARIN SODIUM 40 MILLIGRAM(S): 100 INJECTION SUBCUTANEOUS at 18:33

## 2023-02-14 RX ADMIN — ATORVASTATIN CALCIUM 40 MILLIGRAM(S): 80 TABLET, FILM COATED ORAL at 21:14

## 2023-02-14 RX ADMIN — Medication 40 MILLIEQUIVALENT(S): at 17:16

## 2023-02-14 RX ADMIN — RISPERIDONE 0.25 MILLIGRAM(S): 4 TABLET ORAL at 05:41

## 2023-02-14 RX ADMIN — Medication 5 MILLIGRAM(S): at 21:51

## 2023-02-14 RX ADMIN — Medication 50 MILLIGRAM(S): at 21:14

## 2023-02-14 NOTE — DISCHARGE NOTE NURSING/CASE MANAGEMENT/SOCIAL WORK - NSDCPEFALRISK_GEN_ALL_CORE
For information on Fall & Injury Prevention, visit: [email or web address] OR  [email or web address] OR  https://www.cdc.gov/steadi/patient.html

## 2023-02-14 NOTE — PROGRESS NOTE ADULT - ASSESSMENT
82F Dementia, HTN, Anxiety and GERD admitted for Dementia with Behavioral Disturbances and Bradycardia    Bradycardia / HTN  Currently in Sinus with HR in 60-70 range   Echo on last admission done with no significant valve pathology and normal EF   Will monitor on Telemetry  No dose adjustments needed on Metoprolol at this time  Cardiology Consult noted     Dementia with Behavioral Disturbances  Risperidol + Seroquel 50mg at night   D/C Abilify; Resume Xanax once daily   UTI ruled out  Consult Psych     GERD  Famotidine     History of TIA  Aspirin + Statin     Diet  Regular    DVT Prophylaxis  Lovenox    Disposition   Discharge planning pending hospital course

## 2023-02-14 NOTE — CARE COORDINATION ASSESSMENT. - NS SW READMITTED REASON
- Patient was admitted to [facility] from 02/05/2023 until 02/09/2023 when she returned to her assisted living facility w/ home care physical therapy services & new private-pay 24hr aide services as arranged by her daughter-in-law. 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

## 2023-02-14 NOTE — BH CONSULTATION LIAISON ASSESSMENT NOTE - HPI (INCLUDE ILLNESS QUALITY, SEVERITY, DURATION, TIMING, CONTEXT, MODIFYING FACTORS, ASSOCIATED SIGNS AND SYMPTOMS)
Patient 82F Dementia, HTN, Anxiety and GERD brought in from the [facility] after being found today slumped in her chair and minimally responsive.  Patient unclear of why she's here and confused overall. Most of the history obtained by the daughter at the bedside. She was admitted here few weeks prior for presumed stroke and had an extensive workup that was negative.  Facility reported that patient was also bradycardic at the time.  No transfer sheet to review the events or any other data. Currently patient is in bed, confused but comfortable with HR in the 70's. Daughter reported that facility has been changing the medications in attempts for better behavioral control due to underlying dementia and has been increasing dosages. Patient is currently calm and cooperative, no evidence of psychosis, but appears to be pleasantly confused.

## 2023-02-14 NOTE — PATIENT CHOICE NOTE. - NSPTCHOICESTATE_GEN_ALL_CORE

## 2023-02-14 NOTE — BH CONSULTATION LIAISON ASSESSMENT NOTE - NSBHCHARTREVIEWLAB_PSY_A_CORE FT
13.0   6.60  )-----------( 218      ( 14 Feb 2023 08:04 )             40.7   02-14    142  |  108  |  13  ----------------------------<  93  3.4<L>   |  24  |  0.57    Ca    8.7      14 Feb 2023 08:04    TPro  6.5  /  Alb  2.9<L>  /  TBili  0.5  /  DBili  x   /  AST  18  /  ALT  19  /  AlkPhos  87  02-14

## 2023-02-14 NOTE — CARE COORDINATION ASSESSMENT. - NSDCPLANSERVICES_GEN_ALL_CORE
spoke w/ Ruchi, Director of Case Management for [facility] in [place], who confirmed that patient would be accepted back to the assisted living w/ resumption of home care physical therapy & private aide services.

## 2023-02-14 NOTE — DISCHARGE NOTE NURSING/CASE MANAGEMENT/SOCIAL WORK - NSDCCRNAME_GEN_ALL_CORE_FT
You are a resident of [facility] on [address] [phone]/ fax [phone]. Utilizes [facility] pharmacy [phone]  You are a resident of [facility] on [address] [phone]/ fax [phone]. Utilizes [facility] pharmacy [phone]

## 2023-02-14 NOTE — PATIENT CHOICE NOTE. - NSPTCHOICENOTES_GEN_ALL_CORE
prior to admission pt was still under care and receiving services thru [facility] [phone] home care agency and family opting to resume services with same.

## 2023-02-14 NOTE — BH CONSULTATION LIAISON ASSESSMENT NOTE - CURRENT MEDICATION
MEDICATIONS  (STANDING):  aspirin enteric coated 81 milliGRAM(s) Oral daily  atorvastatin 40 milliGRAM(s) Oral at bedtime  enoxaparin Injectable 40 milliGRAM(s) SubCutaneous every 24 hours  famotidine    Tablet 20 milliGRAM(s) Oral daily  losartan 50 milliGRAM(s) Oral daily  melatonin 5 milliGRAM(s) Oral at bedtime  metoprolol tartrate 25 milliGRAM(s) Oral two times a day  polyethylene glycol 3350 17 Gram(s) Oral daily  potassium chloride    Tablet ER 40 milliEquivalent(s) Oral once  risperiDONE   Tablet 0.5 milliGRAM(s) Oral two times a day  traZODone 50 milliGRAM(s) Oral at bedtime    MEDICATIONS  (PRN):  acetaminophen     Tablet .. 650 milliGRAM(s) Oral every 6 hours PRN Temp greater or equal to 38C (100.4F), Mild Pain (1 - 3)

## 2023-02-14 NOTE — CARE COORDINATION ASSESSMENT. - NSCAREPROVIDERS_GEN_ALL_CORE_FT
CARE PROVIDERS:  Accepting Physician: Shawn Ramesh  Administration: Yolie Padron  Admitting: Shawn Ramesh  Attending: Shawn Ramesh  Consultant: Martin Chandler  Consultant: Mendez Ahn  ED Attending: Hank Herron  ED Nurse: Devorah Pichardo  Nurse: Roxann Morgan  Nurse: Eileen Warner  Nurse: Cherry Jeff  Nurse: Yoselin Peacock  Outpatient Provider: Martin Chandler  PCA/Nursing Assistant: Chyna Hwang  PCA/Nursing Assistant: Kory Waggoner  Primary Team: Shawn Ramesh  Primary Team: Joyce Keyes  : Doris Pederson  Team: [facility] Hospitalists, Team  UR// Supp. Assoc.: Maricarmen Prasad   CARE PROVIDERS:  Accepting Physician: Shawn Ramesh  Access Services: Desirae Moon  Administration: Yolie Padron  Admitting: Shawn Ramesh  Attending: Shawn Ramesh  Case Management: Kristie Hernandez  Consultant: Rc Coates  Consultant: Martin Chandler  Consultant: Mendez Ahn  ED Attending: Hank Herron  ED Nurse: Devorah Pichardo  Infection Control: Kristin Rodríguez  Nurse: Cherry Jeff  Nurse: Roxann Morgan  Outpatient Provider: Martin Chandler  PCA/Nursing Assistant: Chyna Hwang  Primary Team: Shawn Ramesh  Registered Dietitian: Lula Maxwell  : Doris Pederson  Team: [facility] Hospitalists, Team  UR// Supp. Assoc.: Maricarmen Prasad

## 2023-02-14 NOTE — DISCHARGE NOTE NURSING/CASE MANAGEMENT/SOCIAL WORK - PATIENT PORTAL LINK FT
You can access the FollowMyHealth Patient Portal offered by [facility] by registering at the following website: [email or web address]. By joining [facility]’s FollowMyHealth portal, you will also be able to view your health information using other applications (apps) compatible with our system.

## 2023-02-14 NOTE — CARE COORDINATION ASSESSMENT. - REASON FOR CONSULT
coordination of care/discharge planning/admitted from facility/homecare service prior to admission/patient meets high risk criteria (i.e.: [facility] admit., readmit w/in 30 days)

## 2023-02-14 NOTE — PROGRESS NOTE ADULT - ASSESSMENT
The patient is an 82 year old female with a history of HTN, DVT, anemia, anxiety, GERD, dementia who presents with unresponsiveness.    Plan:  - Unclear if true syncope vs. AMS  - Troponin borderline elevated at 65 in the setting of demand ischemia from possible syncope  - Echo last admission with normal LV systolic function, no significant valve issues  - Monitor on telemetry  - CT head with multiple old lacunar infarcts  - Continue losartan 50 mg daily  - Continue metoprolol tartrate 25 mg bid  - Continue atorvastatin 40 mg daily  - Continue aspirin 81 mg daily  - May need adjustment of antipsychotic medications  - Neurology follow-up

## 2023-02-14 NOTE — BH CONSULTATION LIAISON ASSESSMENT NOTE - NSBHCHARTREVIEWVS_PSY_A_CORE FT
Vital Signs Last 24 Hrs  T(C): 36.3 (14 Feb 2023 06:34), Max: 37 (13 Feb 2023 18:02)  T(F): 97.4 (14 Feb 2023 06:34), Max: 98.6 (13 Feb 2023 18:02)  HR: 86 (14 Feb 2023 06:34) (50 - 86)  BP: 186/86 (14 Feb 2023 06:34) (122/66 - 186/86)  BP(mean): --  RR: 18 (14 Feb 2023 06:34) (14 - 18)  SpO2: 98% (14 Feb 2023 06:34) (96% - 99%)    Parameters below as of 13 Feb 2023 21:06  Patient On (Oxygen Delivery Method): room air

## 2023-02-15 LAB
ALBUMIN SERPL ELPH-MCNC: 3.6 G/DL — SIGNIFICANT CHANGE UP (ref 3.3–5)
ALP SERPL-CCNC: 107 U/L — SIGNIFICANT CHANGE UP (ref 30–120)
ALT FLD-CCNC: 26 U/L DA — SIGNIFICANT CHANGE UP (ref 10–60)
ANION GAP SERPL CALC-SCNC: 13 MMOL/L — SIGNIFICANT CHANGE UP (ref 5–17)
AST SERPL-CCNC: 26 U/L — SIGNIFICANT CHANGE UP (ref 10–40)
BASOPHILS # BLD AUTO: 0.05 K/UL — SIGNIFICANT CHANGE UP (ref 0–0.2)
BASOPHILS NFR BLD AUTO: 0.7 % — SIGNIFICANT CHANGE UP (ref 0–2)
BILIRUB SERPL-MCNC: 0.5 MG/DL — SIGNIFICANT CHANGE UP (ref 0.2–1.2)
BUN SERPL-MCNC: 8 MG/DL — SIGNIFICANT CHANGE UP (ref 7–23)
CALCIUM SERPL-MCNC: 9.3 MG/DL — SIGNIFICANT CHANGE UP (ref 8.4–10.5)
CHLORIDE SERPL-SCNC: 102 MMOL/L — SIGNIFICANT CHANGE UP (ref 96–108)
CO2 SERPL-SCNC: 24 MMOL/L — SIGNIFICANT CHANGE UP (ref 22–31)
CREAT SERPL-MCNC: 0.56 MG/DL — SIGNIFICANT CHANGE UP (ref 0.5–1.3)
CULTURE RESULTS: NO GROWTH — SIGNIFICANT CHANGE UP
EGFR: 90 ML/MIN/1.73M2 — SIGNIFICANT CHANGE UP
EOSINOPHIL # BLD AUTO: 0.28 K/UL — SIGNIFICANT CHANGE UP (ref 0–0.5)
EOSINOPHIL NFR BLD AUTO: 3.7 % — SIGNIFICANT CHANGE UP (ref 0–6)
GLUCOSE SERPL-MCNC: 108 MG/DL — HIGH (ref 70–99)
HCT VFR BLD CALC: 44.6 % — SIGNIFICANT CHANGE UP (ref 34.5–45)
HGB BLD-MCNC: 14.8 G/DL — SIGNIFICANT CHANGE UP (ref 11.5–15.5)
IMM GRANULOCYTES NFR BLD AUTO: 0.7 % — SIGNIFICANT CHANGE UP (ref 0–0.9)
LYMPHOCYTES # BLD AUTO: 1.51 K/UL — SIGNIFICANT CHANGE UP (ref 1–3.3)
LYMPHOCYTES # BLD AUTO: 20.2 % — SIGNIFICANT CHANGE UP (ref 13–44)
MCHC RBC-ENTMCNC: 30.7 PG — SIGNIFICANT CHANGE UP (ref 27–34)
MCHC RBC-ENTMCNC: 33.2 GM/DL — SIGNIFICANT CHANGE UP (ref 32–36)
MCV RBC AUTO: 92.5 FL — SIGNIFICANT CHANGE UP (ref 80–100)
MONOCYTES # BLD AUTO: 0.55 K/UL — SIGNIFICANT CHANGE UP (ref 0–0.9)
MONOCYTES NFR BLD AUTO: 7.3 % — SIGNIFICANT CHANGE UP (ref 2–14)
NEUTROPHILS # BLD AUTO: 5.05 K/UL — SIGNIFICANT CHANGE UP (ref 1.8–7.4)
NEUTROPHILS NFR BLD AUTO: 67.4 % — SIGNIFICANT CHANGE UP (ref 43–77)
NRBC # BLD: 0 /100 WBCS — SIGNIFICANT CHANGE UP (ref 0–0)
PLATELET # BLD AUTO: 266 K/UL — SIGNIFICANT CHANGE UP (ref 150–400)
POTASSIUM SERPL-MCNC: 3.7 MMOL/L — SIGNIFICANT CHANGE UP (ref 3.5–5.3)
POTASSIUM SERPL-SCNC: 3.7 MMOL/L — SIGNIFICANT CHANGE UP (ref 3.5–5.3)
PROT SERPL-MCNC: 7.4 G/DL — SIGNIFICANT CHANGE UP (ref 6–8.3)
RBC # BLD: 4.82 M/UL — SIGNIFICANT CHANGE UP (ref 3.8–5.2)
RBC # FLD: 12.6 % — SIGNIFICANT CHANGE UP (ref 10.3–14.5)
SODIUM SERPL-SCNC: 139 MMOL/L — SIGNIFICANT CHANGE UP (ref 135–145)
SPECIMEN SOURCE: SIGNIFICANT CHANGE UP
WBC # BLD: 7.49 K/UL — SIGNIFICANT CHANGE UP (ref 3.8–10.5)
WBC # FLD AUTO: 7.49 K/UL — SIGNIFICANT CHANGE UP (ref 3.8–10.5)

## 2023-02-15 PROCEDURE — 99233 SBSQ HOSP IP/OBS HIGH 50: CPT

## 2023-02-15 RX ORDER — ALPRAZOLAM 0.25 MG
0.25 TABLET ORAL AT BEDTIME
Refills: 0 | Status: DISCONTINUED | OUTPATIENT
Start: 2023-02-15 | End: 2023-02-20

## 2023-02-15 RX ORDER — LOSARTAN POTASSIUM 100 MG/1
100 TABLET, FILM COATED ORAL DAILY
Refills: 0 | Status: DISCONTINUED | OUTPATIENT
Start: 2023-02-15 | End: 2023-02-18

## 2023-02-15 RX ADMIN — LOSARTAN POTASSIUM 100 MILLIGRAM(S): 100 TABLET, FILM COATED ORAL at 08:40

## 2023-02-15 RX ADMIN — POLYETHYLENE GLYCOL 3350 17 GRAM(S): 17 POWDER, FOR SOLUTION ORAL at 11:35

## 2023-02-15 RX ADMIN — FAMOTIDINE 20 MILLIGRAM(S): 10 INJECTION INTRAVENOUS at 11:31

## 2023-02-15 RX ADMIN — Medication 81 MILLIGRAM(S): at 11:35

## 2023-02-15 RX ADMIN — RISPERIDONE 0.5 MILLIGRAM(S): 4 TABLET ORAL at 06:16

## 2023-02-15 RX ADMIN — RISPERIDONE 0.5 MILLIGRAM(S): 4 TABLET ORAL at 17:16

## 2023-02-15 RX ADMIN — ENOXAPARIN SODIUM 40 MILLIGRAM(S): 100 INJECTION SUBCUTANEOUS at 19:24

## 2023-02-15 RX ADMIN — Medication 25 MILLIGRAM(S): at 06:16

## 2023-02-15 RX ADMIN — ATORVASTATIN CALCIUM 40 MILLIGRAM(S): 80 TABLET, FILM COATED ORAL at 21:02

## 2023-02-15 RX ADMIN — Medication 5 MILLIGRAM(S): at 21:02

## 2023-02-15 RX ADMIN — LOSARTAN POTASSIUM 50 MILLIGRAM(S): 100 TABLET, FILM COATED ORAL at 06:16

## 2023-02-15 RX ADMIN — Medication 50 MILLIGRAM(S): at 21:02

## 2023-02-15 RX ADMIN — Medication 25 MILLIGRAM(S): at 17:16

## 2023-02-15 NOTE — PROGRESS NOTE ADULT - ASSESSMENT
82F Dementia, HTN, Anxiety and GERD admitted for Dementia with Behavioral Disturbances and Bradycardia    Dementia with Behavioral Disturbances  Risperidol + Seroquel 50mg at night   D/C Abilify  UTI ruled out  Consult Psych, meds titration per psych  Neuro following, jarvis recs    Bradycardia / HTN  Currently in Sinus with HR in 60-70 range   Echo on last admission done with no significant valve pathology and normal EF   Will monitor on Telemetry  No dose adjustments needed on Metoprolol at this time  Cardiology Consult noted     GERD  Famotidine     History of TIA  Aspirin + Statin     Diet  Regular    DVT Prophylaxis  Lovenox    Disposition   Discharge planning pending hospital course              82F Dementia, HTN, Anxiety and GERD admitted for Dementia with Behavioral Disturbances and Bradycardia    Dementia with Behavioral Disturbances  Risperidol + Seroquel 50mg at night   D/C Abilify  UTI ruled out  Consult Psych, meds titration per psych  Neuro following, jarvis recs    Bradycardia  Currently in Sinus with HR in 60-70 range   Echo on last admission done with no significant valve pathology and normal EF   Will monitor on Telemetry  No dose adjustments needed on Metoprolol at this time  Cardiology Consult noted     HTN  BP not controlled, increase losartan to 100mg QD    GERD  Famotidine     History of TIA  Aspirin + Statin     Diet  Regular    DVT Prophylaxis  Lovenox    Disposition   Discharge planning pending hospital course

## 2023-02-15 NOTE — PHYSICAL THERAPY INITIAL EVALUATION ADULT - NSPTDISCHREC_GEN_A_CORE
Pt would benefit from short term stay at [facility] to increase strength and balance to decrease risk for falling./Sub-acute Rehab

## 2023-02-15 NOTE — CASE MANAGEMENT PROGRESS NOTE - NSCMPROGRESSNOTE_GEN_ALL_CORE
RN/CM and MSW spoke to pt's family @ bedside and @ this time, they are receptive for rehab/skilled nursing facility placement if indicated but still are hoping that once pt's meds are adjusted that pt could return to [facility] assisted living facility @ [place] [phone]. AS per assisted living facility once pt is deemed able to return to [facility], will need attestation from, COVID results within 48-72H of DC, PT notes faxed to [phone]; any NEW/CHANGED meds to be sent over to [facility] pharmacy, and that [facility] [phone]. DC plan pending pt's hospital course. Both CM and MSW remain available and continue to follow case.

## 2023-02-15 NOTE — CAREGIVER ENGAGEMENT NOTE - CAREGIVER EDUCATION - TYPES DISCUSSED
After-care skilled tasks/Assisted living/Discharge plan/DME/Insurance benefits/Post-acute care agency contact/Post-discharge escalation process/Transportation coordination/Transportation letter provided

## 2023-02-15 NOTE — PHYSICAL THERAPY INITIAL EVALUATION ADULT - ADDITIONAL COMMENTS
Pt arrived from [facility]. As per daughter, pt was ambulating with rolling walker, occasionally requiring assistance with bed mobility and transfers. Pt has rolling walker at facility.

## 2023-02-15 NOTE — CAREGIVER ENGAGEMENT NOTE - CAREGIVER EDUCATION NOTES - FREE TEXT
Per discussion w/ inpatient interdisciplinary treatment team this AM, patient is not yet medically cleared for transition to next level of care.  met w/ patient's daughters-in-law Cathryn & Celeste @ bedside on [address] for discussion of discharge plan. Daughters-IL indicated potential interest in having patient receive sub-acute rehab services @ [facility] in [place] but were still deciding if such was the best discharge plan for patient; as such,  to proceed w/ sub-acute rehab referral upon completion of inpatient physical therapy initial evaluation. Will continue to follow.

## 2023-02-15 NOTE — PHYSICAL THERAPY INITIAL EVALUATION ADULT - PERTINENT HX OF CURRENT PROBLEM, REHAB EVAL
82F Dementia, HTN, Anxiety and GERD brought in from the [place] after being found today slumped in her chair and minimally responsive.  Patient unclear of why she's here and confused overall. Most of the history obtained by the daughter at the bedside. She was admitted here few weeks prior for presumed stroke and had an extensive workup that was negative.  Facility reported that patient was also bradycardic at the time.  No transfer sheet to review the events or any other data. Currently patient is in bed, confused but comfortable with HR in the 70's. Daughter reported that facility has been changing the medications in attempts for better behavioral control due to underlying dementia and has been increasing dosages. Appears Xanax was recently started.

## 2023-02-16 PROCEDURE — 99232 SBSQ HOSP IP/OBS MODERATE 35: CPT

## 2023-02-16 RX ORDER — TRAZODONE HCL 50 MG
100 TABLET ORAL AT BEDTIME
Refills: 0 | Status: DISCONTINUED | OUTPATIENT
Start: 2023-02-16 | End: 2023-02-20

## 2023-02-16 RX ADMIN — POLYETHYLENE GLYCOL 3350 17 GRAM(S): 17 POWDER, FOR SOLUTION ORAL at 12:10

## 2023-02-16 RX ADMIN — Medication 25 MILLIGRAM(S): at 17:52

## 2023-02-16 RX ADMIN — Medication 25 MILLIGRAM(S): at 05:20

## 2023-02-16 RX ADMIN — Medication 5 MILLIGRAM(S): at 22:02

## 2023-02-16 RX ADMIN — ATORVASTATIN CALCIUM 40 MILLIGRAM(S): 80 TABLET, FILM COATED ORAL at 22:04

## 2023-02-16 RX ADMIN — FAMOTIDINE 20 MILLIGRAM(S): 10 INJECTION INTRAVENOUS at 12:11

## 2023-02-16 RX ADMIN — ENOXAPARIN SODIUM 40 MILLIGRAM(S): 100 INJECTION SUBCUTANEOUS at 17:52

## 2023-02-16 RX ADMIN — RISPERIDONE 0.5 MILLIGRAM(S): 4 TABLET ORAL at 05:20

## 2023-02-16 RX ADMIN — RISPERIDONE 0.5 MILLIGRAM(S): 4 TABLET ORAL at 17:52

## 2023-02-16 RX ADMIN — LOSARTAN POTASSIUM 100 MILLIGRAM(S): 100 TABLET, FILM COATED ORAL at 05:21

## 2023-02-16 RX ADMIN — Medication 100 MILLIGRAM(S): at 22:01

## 2023-02-16 RX ADMIN — Medication 81 MILLIGRAM(S): at 12:11

## 2023-02-16 RX ADMIN — Medication 0.25 MILLIGRAM(S): at 00:11

## 2023-02-16 NOTE — PROGRESS NOTE ADULT - ASSESSMENT
The patient is an 82 year old female with a history of HTN, DVT, anemia, anxiety, GERD, dementia who presents with unresponsiveness.    Plan:  - Unclear if true syncope vs. AMS  - Troponin borderline elevated at 65 in the setting of demand ischemia from possible syncope  - Echo last admission with normal LV systolic function, no significant valve issues  - Monitor on telemetry  - CT head with multiple old lacunar infarcts  - Continue losartan 50 mg daily  - Continue metoprolol tartrate 25 mg bid  - Continue atorvastatin 40 mg daily  - Continue aspirin 81 mg daily  - Neurology follow-up

## 2023-02-16 NOTE — PROGRESS NOTE ADULT - ASSESSMENT
82F Dementia, HTN, Anxiety and GERD admitted for Dementia with Behavioral Disturbances and Bradycardia    Dementia with Behavioral Disturbances  didnt sleep well last night  now sleeping,   f/u with psych   Risperidol + Seroquel 50mg at night   D/C Abilify      Bradycardia  Currently in Sinus with HR in 60-70 range   Echo on last admission done with no significant valve pathology and normal EF   Will monitor on Telemetry  No dose adjustments needed on Metoprolol at this time  Cardiology Consult noted     HTN  BP not controlled, increase losartan to 100mg QD    GERD  Famotidine     History of TIA  Aspirin + Statin     Diet  Regular    DVT Prophylaxis  Lovenox    Disposition   Discharge planning pending hospital course

## 2023-02-17 ENCOUNTER — TRANSCRIPTION ENCOUNTER (OUTPATIENT)
Age: 83
End: 2023-02-17

## 2023-02-17 LAB
ALBUMIN SERPL ELPH-MCNC: 3.4 G/DL — SIGNIFICANT CHANGE UP (ref 3.3–5)
ALP SERPL-CCNC: 100 U/L — SIGNIFICANT CHANGE UP (ref 30–120)
ALT FLD-CCNC: 36 U/L DA — SIGNIFICANT CHANGE UP (ref 10–60)
ANION GAP SERPL CALC-SCNC: 11 MMOL/L — SIGNIFICANT CHANGE UP (ref 5–17)
AST SERPL-CCNC: 30 U/L — SIGNIFICANT CHANGE UP (ref 10–40)
BILIRUB SERPL-MCNC: 0.5 MG/DL — SIGNIFICANT CHANGE UP (ref 0.2–1.2)
BUN SERPL-MCNC: 21 MG/DL — SIGNIFICANT CHANGE UP (ref 7–23)
CALCIUM SERPL-MCNC: 9.3 MG/DL — SIGNIFICANT CHANGE UP (ref 8.4–10.5)
CHLORIDE SERPL-SCNC: 105 MMOL/L — SIGNIFICANT CHANGE UP (ref 96–108)
CO2 SERPL-SCNC: 25 MMOL/L — SIGNIFICANT CHANGE UP (ref 22–31)
CREAT SERPL-MCNC: 0.68 MG/DL — SIGNIFICANT CHANGE UP (ref 0.5–1.3)
EGFR: 86 ML/MIN/1.73M2 — SIGNIFICANT CHANGE UP
GLUCOSE SERPL-MCNC: 104 MG/DL — HIGH (ref 70–99)
HCT VFR BLD CALC: 47 % — HIGH (ref 34.5–45)
HGB BLD-MCNC: 15.1 G/DL — SIGNIFICANT CHANGE UP (ref 11.5–15.5)
MCHC RBC-ENTMCNC: 30.5 PG — SIGNIFICANT CHANGE UP (ref 27–34)
MCHC RBC-ENTMCNC: 32.1 GM/DL — SIGNIFICANT CHANGE UP (ref 32–36)
MCV RBC AUTO: 94.9 FL — SIGNIFICANT CHANGE UP (ref 80–100)
NRBC # BLD: 0 /100 WBCS — SIGNIFICANT CHANGE UP (ref 0–0)
PLATELET # BLD AUTO: 255 K/UL — SIGNIFICANT CHANGE UP (ref 150–400)
POTASSIUM SERPL-MCNC: 3.9 MMOL/L — SIGNIFICANT CHANGE UP (ref 3.5–5.3)
POTASSIUM SERPL-SCNC: 3.9 MMOL/L — SIGNIFICANT CHANGE UP (ref 3.5–5.3)
PROT SERPL-MCNC: 7 G/DL — SIGNIFICANT CHANGE UP (ref 6–8.3)
RBC # BLD: 4.95 M/UL — SIGNIFICANT CHANGE UP (ref 3.8–5.2)
RBC # FLD: 13.1 % — SIGNIFICANT CHANGE UP (ref 10.3–14.5)
SARS-COV-2 RNA SPEC QL NAA+PROBE: SIGNIFICANT CHANGE UP
SODIUM SERPL-SCNC: 141 MMOL/L — SIGNIFICANT CHANGE UP (ref 135–145)
WBC # BLD: 8.35 K/UL — SIGNIFICANT CHANGE UP (ref 3.8–10.5)
WBC # FLD AUTO: 8.35 K/UL — SIGNIFICANT CHANGE UP (ref 3.8–10.5)

## 2023-02-17 PROCEDURE — 99232 SBSQ HOSP IP/OBS MODERATE 35: CPT

## 2023-02-17 RX ORDER — MULTIVIT-MIN/FERROUS GLUCONATE 9 MG/15 ML
0 LIQUID (ML) ORAL
Qty: 0 | Refills: 0 | DISCHARGE

## 2023-02-17 RX ORDER — FOLIC ACID 0.8 MG
1 TABLET ORAL
Qty: 0 | Refills: 0 | DISCHARGE

## 2023-02-17 RX ORDER — ALPRAZOLAM 0.25 MG
1 TABLET ORAL
Qty: 0 | Refills: 0 | DISCHARGE
Start: 2023-02-17

## 2023-02-17 RX ORDER — ATORVASTATIN CALCIUM 80 MG/1
1 TABLET, FILM COATED ORAL
Qty: 0 | Refills: 0 | DISCHARGE

## 2023-02-17 RX ORDER — ARIPIPRAZOLE 15 MG/1
1 TABLET ORAL
Qty: 0 | Refills: 0 | DISCHARGE

## 2023-02-17 RX ORDER — CHOLECALCIFEROL (VITAMIN D3) 125 MCG
1 CAPSULE ORAL
Qty: 0 | Refills: 0 | DISCHARGE

## 2023-02-17 RX ORDER — ALPRAZOLAM 0.25 MG
0 TABLET ORAL
Qty: 0 | Refills: 0 | DISCHARGE

## 2023-02-17 RX ORDER — PREGABALIN 225 MG/1
1 CAPSULE ORAL
Qty: 0 | Refills: 0 | DISCHARGE

## 2023-02-17 RX ADMIN — ENOXAPARIN SODIUM 40 MILLIGRAM(S): 100 INJECTION SUBCUTANEOUS at 19:17

## 2023-02-17 RX ADMIN — Medication 100 MILLIGRAM(S): at 21:59

## 2023-02-17 RX ADMIN — Medication 650 MILLIGRAM(S): at 22:34

## 2023-02-17 RX ADMIN — RISPERIDONE 0.5 MILLIGRAM(S): 4 TABLET ORAL at 17:09

## 2023-02-17 RX ADMIN — Medication 5 MILLIGRAM(S): at 22:00

## 2023-02-17 RX ADMIN — LOSARTAN POTASSIUM 100 MILLIGRAM(S): 100 TABLET, FILM COATED ORAL at 05:43

## 2023-02-17 RX ADMIN — ATORVASTATIN CALCIUM 40 MILLIGRAM(S): 80 TABLET, FILM COATED ORAL at 22:00

## 2023-02-17 RX ADMIN — POLYETHYLENE GLYCOL 3350 17 GRAM(S): 17 POWDER, FOR SOLUTION ORAL at 11:28

## 2023-02-17 RX ADMIN — RISPERIDONE 0.5 MILLIGRAM(S): 4 TABLET ORAL at 05:43

## 2023-02-17 RX ADMIN — Medication 650 MILLIGRAM(S): at 21:59

## 2023-02-17 RX ADMIN — FAMOTIDINE 20 MILLIGRAM(S): 10 INJECTION INTRAVENOUS at 11:28

## 2023-02-17 RX ADMIN — Medication 25 MILLIGRAM(S): at 17:09

## 2023-02-17 RX ADMIN — Medication 81 MILLIGRAM(S): at 11:28

## 2023-02-17 RX ADMIN — Medication 25 MILLIGRAM(S): at 05:43

## 2023-02-17 NOTE — DISCHARGE NOTE PROVIDER - NSDCMRMEDTOKEN_GEN_ALL_CORE_FT
ALPRAZolam 0.25 mg oral tablet: 1 tab(s) orally once a day (at bedtime), As needed, anxiety/agitation  ARIPiprazole 10 mg oral tablet: 1 tab(s) orally once a day  Calcium 600+D 600 mg-5 mcg (200 intl units) oral tablet: orally 2 times a day  cyanocobalamin 1000 mcg/mL injectable solution: 1000 microgram(s) injectable every 4 weeks  famotidine 20 mg oral tablet: orally once a day  losartan 50 mg oral tablet: 1 tab(s) orally once a day  Melatonin 5 mg oral tablet: 1 tab(s) orally once a day (at bedtime)  metoprolol tartrate 25 mg oral tablet: 1 tab(s) orally 2 times a day  polyethylene glycol 3350 oral powder for reconstitution: orally once a day  risperiDONE 0.25 mg oral tablet: 1 tab(s) orally 2 times a day   ALPRAZolam 0.25 mg oral tablet: 1 tab(s) orally once a day (at bedtime), As needed, anxiety/agitation  Calcium 600+D 600 mg-5 mcg (200 intl units) oral tablet: orally 2 times a day  cyanocobalamin 1000 mcg/mL injectable solution: 1000 microgram(s) injectable every 4 weeks  famotidine 20 mg oral tablet: orally once a day  losartan 50 mg oral tablet: 1 tab(s) orally once a day  Melatonin 5 mg oral tablet: 1 tab(s) orally once a day (at bedtime)  metoprolol tartrate 25 mg oral tablet: 1 tab(s) orally 2 times a day  polyethylene glycol 3350 oral powder for reconstitution: orally once a day  risperiDONE 0.25 mg oral tablet: 1 tab(s) orally 2 times a day   ALPRAZolam 0.25 mg oral tablet: 1 tab(s) orally once a day (at bedtime), As needed, anxiety/agitation  Calcium 600+D 600 mg-5 mcg (200 intl units) oral tablet: orally 2 times a day  Cozaar 50 mg oral tablet: 1 tab(s) orally 2 times a day  cyanocobalamin 1000 mcg/mL injectable solution: 1000 microgram(s) injectable every 4 weeks  famotidine 20 mg oral tablet: orally once a day  Melatonin 5 mg oral tablet: 1 tab(s) orally once a day (at bedtime)  metoprolol tartrate 25 mg oral tablet: 1 tab(s) orally 2 times a day  polyethylene glycol 3350 oral powder for reconstitution: orally once a day  risperiDONE 0.25 mg oral tablet: 1 tab(s) orally 2 times a day

## 2023-02-17 NOTE — SOCIAL WORK PROGRESS NOTE - NSSWPROGRESSNOTE_GEN_ALL_CORE
Per discussion w/ inpatient interdisciplinary treatment team this PM, patient is medically cleared for transition to next level of care today.  spoke w/ patient's daughter-in-law Cathryn @ [phone] who reported to be agreeable to discharge to the family's preferred sub-acute rehab facility, [facility] in [place], should a bed be available today or tomorrow.  therefore sent electronic message to accepting sub-acute rehab facility choice @ 11:30 and did not receive response;  called & spoke w/ admissions dept 2x after 14:30 and was notified that they would respond electronically in referral system, but  did not receive message back by end of shift to set up discharge. Will notify weekend  to continue to follow.

## 2023-02-17 NOTE — DISCHARGE NOTE PROVIDER - HOSPITAL COURSE
82F Dementia, HTN, Anxiety and GERD admitted for Dementia with Behavioral Disturbances and Bradycardia    Dementia with Behavioral Disturbances  Slept well overnight. Will increase Seroquel to 75mg at bedtime  Risperidol + Seroquel 50mg at night   Xanax has been made PRN instead of standing   D/C Abilify    Bradycardia  Currently in Sinus with HR in 60-70 range   Echo on last admission done with no significant valve pathology and normal EF   Will monitor on Telemetry  No dose adjustments needed on Metoprolol at this time  Cardiology Consult noted     HTN  BP not controlled, increase losartan to 100mg QD    GERD  Famotidine     History of TIA  Aspirin + Statin     Disposition   Discharge Rehab when bed available 82F Dementia, HTN, Anxiety and GERD admitted for Dementia with Behavioral Disturbances and Bradycardia    Dementia with Behavioral Disturbances  Slept well overnight. Will increase Seroquel to 75mg at bedtime  Risperidol + Seroquel 50mg at night   Xanax has been made PRN instead of standing   D/C Abilify    Bradycardia  Currently in Sinus with HR in 60-70 range   Echo on last admission done with no significant valve pathology and normal EF   Will monitor on Telemetry  No dose adjustments needed on Metoprolol at this time  Cardiology Consult noted     HTN  BP labile, more so overnight, increased overall losartan dosing, changed to 50 mg losartan BID.     GERD  Famotidine     History of TIA  Aspirin + Statin     Disposition   Discharge Rehab when bed available

## 2023-02-17 NOTE — PROVIDER CONTACT NOTE (MEDICATION) - SITUATION
Pt with BP reading of206/93, 85 LA and 208/78, 72 RA repeated BP approx 5 mins later with reading of 180/82

## 2023-02-17 NOTE — DIETITIAN INITIAL EVALUATION ADULT - ORAL INTAKE PTA/DIET HISTORY
Per transfer document, pt from [facility] was on a regular diet. Was taking calcium + vitamin D, vtiamin B12, folic acid pta, no other nutrition supplement reported.

## 2023-02-17 NOTE — DIETITIAN INITIAL EVALUATION ADULT - OTHER INFO
Visited patient in room, presents with fair-good appetite/po intake, consuming >50-75% of meals. Also noted son brought in foods from home. Denies n/v/d/c, last BM 2/14, bowel regimen in place. No reported difficulty chewing or swallowing. NKFA. Per transfer paper pt 4'11 in height, 128# in weight, current adm weight 141#, height 5'2, pt is overweight in appearance, will continue to monitor weight trends as able.     Pertinent medications/nutrition labs reviewed; Liberalized diet remains appropriate to promote intake. Education is not appropriate at this time due to current mental status. Will continue to encourage po intake as needed. RD to continue to monitor nutrition status per protocol.

## 2023-02-17 NOTE — PROGRESS NOTE ADULT - ASSESSMENT
82F Dementia, HTN, Anxiety and GERD admitted for Dementia with Behavioral Disturbances and Bradycardia    Dementia with Behavioral Disturbances  Slept well overnight. Will increase Seroquel to 75mg at bedtime  Risperidol + Seroquel 50mg at night   Xanax has been made PRN   D/C Abilify    Bradycardia  Currently in Sinus with HR in 60-70 range   Echo on last admission done with no significant valve pathology and normal EF   Will monitor on Telemetry  No dose adjustments needed on Metoprolol at this time  Cardiology Consult noted     HTN  BP not controlled, increase losartan to 100mg QD    GERD  Famotidine     History of TIA  Aspirin + Statin     Diet  Regular    DVT Prophylaxis  Lovenox    Disposition   Discharge Rehab when bed available

## 2023-02-17 NOTE — DISCHARGE NOTE PROVIDER - PROVIDER TOKENS
PROVIDER:[TOKEN:[[id number]],FOLLOWUP:[1 week],ESTABLISHEDPATIENT:[T]],PROVIDER:[TOKEN:[[id number]],FOLLOWUP:[1 week]],PROVIDER:[TOKEN:[[id number]],FOLLOWUP:[2 weeks]]

## 2023-02-17 NOTE — DIETITIAN INITIAL EVALUATION ADULT - REASON FOR ADMISSION
Per H&P "82F Dementia, HTN, Anxiety and GERD brought in from the [facility] after being found today slumped in her chair and minimally responsive.  Patient unclear of why she's here and confused overall. Most of the history obtained by the daughter at the bedside. She was admitted here few weeks prior for presumed stroke and had an extensive workup that was negative.  Facility reported that patient was also bradycardic at the time.  No transfer sheet to review the events or any other data. Currently patient is in bed, confused but comfortable with HR in the 70's. Daughter reported that facility has been changing the medications in attempts for better behavioral control due to underlying dementia and has been increasing doseages. Appears Xanax was recently started."

## 2023-02-17 NOTE — DISCHARGE NOTE PROVIDER - CARE PROVIDER_API CALL
Jose Miguel Ford (DO)  Medicine  [address], [address]  [place] [address]  Phone: [phone]  Fax: [phone]  Established Patient  Follow Up Time: 1 week    Bernarda Ahn)  Cardiovascular Disease; Internal Medicine  [address], [address]  [place] [address]  Phone: [phone]  Fax: [phone]  Follow Up Time: 1 week    Martin Chandler)  Neurology  [address]  [place] [address]  Phone: [phone]  Fax: ()-  Follow Up Time: 2 weeks

## 2023-02-17 NOTE — DIETITIAN INITIAL EVALUATION ADULT - PERTINENT LABORATORY DATA
02-17    141  |  105  |  21  ----------------------------<  104<H>  3.9   |  25  |  0.68    Ca    9.3      17 Feb 2023 07:59    TPro  7.0  /  Alb  3.4  /  TBili  0.5  /  DBili  x   /  AST  30  /  ALT  36  /  AlkPhos  100  02-17  A1C with Estimated Average Glucose Result: 5.5 % (02-05-23 @ 18:00)

## 2023-02-17 NOTE — DIETITIAN INITIAL EVALUATION ADULT - PERTINENT MEDS FT
MEDICATIONS  (STANDING):  aspirin enteric coated 81 milliGRAM(s) Oral daily  atorvastatin 40 milliGRAM(s) Oral at bedtime  enoxaparin Injectable 40 milliGRAM(s) SubCutaneous every 24 hours  famotidine    Tablet 20 milliGRAM(s) Oral daily  losartan 100 milliGRAM(s) Oral daily  melatonin 5 milliGRAM(s) Oral at bedtime  metoprolol tartrate 25 milliGRAM(s) Oral two times a day  polyethylene glycol 3350 17 Gram(s) Oral daily  risperiDONE   Tablet 0.5 milliGRAM(s) Oral two times a day  traZODone 100 milliGRAM(s) Oral at bedtime    MEDICATIONS  (PRN):  acetaminophen     Tablet .. 650 milliGRAM(s) Oral every 6 hours PRN Temp greater or equal to 38C (100.4F), Mild Pain (1 - 3)  ALPRAZolam 0.25 milliGRAM(s) Oral at bedtime PRN anxiety/agitation

## 2023-02-17 NOTE — DISCHARGE NOTE PROVIDER - NSDCCPCAREPLAN_GEN_ALL_CORE_FT
PRINCIPAL DISCHARGE DIAGNOSIS  Diagnosis: Bradycardia  Assessment and Plan of Treatment: It was reported that you were found to be slumped over in your chair and your HR was very low.  We suspect that this was due to the medication adjustements that were ongoing in your medical care at the time.  We have adjusted your medications and have been with a normal heart rate for past 48 hours.      SECONDARY DISCHARGE DIAGNOSES  Diagnosis: Dementia  Assessment and Plan of Treatment: Medications were adjusted. Consultation with Psychiatry obtained.    Diagnosis: Bradycardia  Assessment and Plan of Treatment: Medicaitons adjusted. Consultatino with Cardiology obtained. No additional studies performed. Heart rate has returned back to normal.

## 2023-02-18 PROCEDURE — 99232 SBSQ HOSP IP/OBS MODERATE 35: CPT

## 2023-02-18 RX ORDER — AMLODIPINE BESYLATE 2.5 MG/1
5 TABLET ORAL ONCE
Refills: 0 | Status: COMPLETED | OUTPATIENT
Start: 2023-02-18 | End: 2023-02-18

## 2023-02-18 RX ORDER — LOSARTAN POTASSIUM 100 MG/1
1 TABLET, FILM COATED ORAL
Qty: 0 | Refills: 0 | DISCHARGE
Start: 2023-02-18

## 2023-02-18 RX ORDER — LOSARTAN POTASSIUM 100 MG/1
50 TABLET, FILM COATED ORAL
Refills: 0 | Status: DISCONTINUED | OUTPATIENT
Start: 2023-02-18 | End: 2023-02-20

## 2023-02-18 RX ORDER — HALOPERIDOL DECANOATE 100 MG/ML
1 INJECTION INTRAMUSCULAR ONCE
Refills: 0 | Status: COMPLETED | OUTPATIENT
Start: 2023-02-18 | End: 2023-02-18

## 2023-02-18 RX ORDER — LOSARTAN POTASSIUM 100 MG/1
1 TABLET, FILM COATED ORAL
Qty: 0 | Refills: 0 | DISCHARGE

## 2023-02-18 RX ADMIN — RISPERIDONE 0.5 MILLIGRAM(S): 4 TABLET ORAL at 21:49

## 2023-02-18 RX ADMIN — AMLODIPINE BESYLATE 5 MILLIGRAM(S): 2.5 TABLET ORAL at 00:22

## 2023-02-18 RX ADMIN — ATORVASTATIN CALCIUM 40 MILLIGRAM(S): 80 TABLET, FILM COATED ORAL at 21:52

## 2023-02-18 RX ADMIN — LOSARTAN POTASSIUM 50 MILLIGRAM(S): 100 TABLET, FILM COATED ORAL at 21:49

## 2023-02-18 RX ADMIN — Medication 25 MILLIGRAM(S): at 07:15

## 2023-02-18 RX ADMIN — POLYETHYLENE GLYCOL 3350 17 GRAM(S): 17 POWDER, FOR SOLUTION ORAL at 11:59

## 2023-02-18 RX ADMIN — ENOXAPARIN SODIUM 40 MILLIGRAM(S): 100 INJECTION SUBCUTANEOUS at 17:20

## 2023-02-18 RX ADMIN — Medication 81 MILLIGRAM(S): at 11:59

## 2023-02-18 RX ADMIN — HALOPERIDOL DECANOATE 1 MILLIGRAM(S): 100 INJECTION INTRAMUSCULAR at 18:36

## 2023-02-18 RX ADMIN — Medication 25 MILLIGRAM(S): at 21:49

## 2023-02-18 RX ADMIN — FAMOTIDINE 20 MILLIGRAM(S): 10 INJECTION INTRAVENOUS at 11:59

## 2023-02-18 RX ADMIN — Medication 100 MILLIGRAM(S): at 21:49

## 2023-02-18 RX ADMIN — LOSARTAN POTASSIUM 100 MILLIGRAM(S): 100 TABLET, FILM COATED ORAL at 07:14

## 2023-02-18 RX ADMIN — Medication 5 MILLIGRAM(S): at 21:53

## 2023-02-18 RX ADMIN — RISPERIDONE 0.5 MILLIGRAM(S): 4 TABLET ORAL at 07:14

## 2023-02-18 NOTE — SOCIAL WORK PROGRESS NOTE - NSSWPROGRESSNOTE_GEN_ALL_CORE
SW called admissions rep at [facility] multiple times  and Ilana Schroeder of [place] admissions . Messages left to f/u on dc. No response from [facility] on [facility]. Pt's dtr in law Cathryn  notified that sw will f/u on monday 2/20. Tx team notified.

## 2023-02-18 NOTE — PROGRESS NOTE ADULT - ASSESSMENT
82F Dementia, HTN, Anxiety and GERD admitted for Dementia with Behavioral Disturbances and Bradycardia    Dementia with Behavioral Disturbances  Slept well overnight. Will increase Seroquel to 75mg at bedtime  Risperidol + Seroquel 50mg at night   Xanax has been made PRN   D/C Abilify    Bradycardia  Currently in Sinus with HR in 60-70 range   Echo on last admission done with no significant valve pathology and normal EF   Will monitor on Telemetry  Cardiology Consult noted     HTN  BP labile, increased at night, changed to losartan 50 BID, continue metoprolol 25 bid.    GERD  Famotidine     History of TIA  Aspirin + Statin     Diet  Regular    DVT Prophylaxis  Lovenox    Disposition   Discharge Rehab, See DCS for more info, time spent on DC 40 minutes.

## 2023-02-18 NOTE — PROGRESS NOTE ADULT - ASSESSMENT
The patient is an 82 year old female with a history of HTN, DVT, anemia, anxiety, GERD, dementia who presents with unresponsiveness.    2/18/23  Seen at [facility] telemetry  Lying flat, awake  Evening blood pressure elevated    Plan:  - Unclear if true syncope vs. AMS  - Troponin borderline elevated at 65 in the setting of demand ischemia from possible syncope  - Echo last admission with normal LV systolic function, no significant valve issues-see above  - Monitor on telemetry  - CT head with multiple old lacunar infarcts  - [id number] BID  - Continue metoprolol tartrate 25 mg bid  - Continue atorvastatin 40 mg daily  - Continue aspirin 81 mg daily  - Neurology follow-up

## 2023-02-19 PROCEDURE — 99232 SBSQ HOSP IP/OBS MODERATE 35: CPT

## 2023-02-19 RX ADMIN — POLYETHYLENE GLYCOL 3350 17 GRAM(S): 17 POWDER, FOR SOLUTION ORAL at 12:12

## 2023-02-19 RX ADMIN — ENOXAPARIN SODIUM 40 MILLIGRAM(S): 100 INJECTION SUBCUTANEOUS at 17:52

## 2023-02-19 RX ADMIN — RISPERIDONE 0.5 MILLIGRAM(S): 4 TABLET ORAL at 06:31

## 2023-02-19 RX ADMIN — Medication 81 MILLIGRAM(S): at 12:12

## 2023-02-19 RX ADMIN — FAMOTIDINE 20 MILLIGRAM(S): 10 INJECTION INTRAVENOUS at 12:11

## 2023-02-19 RX ADMIN — LOSARTAN POTASSIUM 50 MILLIGRAM(S): 100 TABLET, FILM COATED ORAL at 12:11

## 2023-02-19 NOTE — PROGRESS NOTE ADULT - ASSESSMENT
82F Dementia, HTN, Anxiety and GERD admitted for Dementia with Behavioral Disturbances and Bradycardia    Dementia with Behavioral Disturbances  Slept well overnight. Will increase Seroquel to 75mg at bedtime  Risperidol + Seroquel 50mg at night   Xanax has been made PRN       Bradycardia - is sinus  Echo on last admission done with no significant valve pathology and normal EF       HTN  BP labile, continue losartan 50 BID, continue metoprolol 25 bid.    GERD  Famotidine     History of TIA  Aspirin + Statin     Diet  Regular    DVT Prophylaxis  Lovenox    Disposition   Discharge Rehab,pending placement, likely tuesday

## 2023-02-19 NOTE — PROGRESS NOTE ADULT - ASSESSMENT
The patient is an 82 year old female with a history of HTN, DVT, anemia, anxiety, GERD, dementia who presents with unresponsiveness.    2/19/23  Seen at [facility] telemetry  Lying flat, sleeping comfortably    Plan:  - Unclear if true syncope vs. 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

## 2023-02-20 VITALS
TEMPERATURE: 99 F | HEART RATE: 84 BPM | SYSTOLIC BLOOD PRESSURE: 123 MMHG | OXYGEN SATURATION: 96 % | RESPIRATION RATE: 18 BRPM | DIASTOLIC BLOOD PRESSURE: 72 MMHG

## 2023-02-20 LAB — SARS-COV-2 RNA SPEC QL NAA+PROBE: SIGNIFICANT CHANGE UP

## 2023-02-20 PROCEDURE — 70450 CT HEAD/BRAIN W/O DYE: CPT | Mod: MA

## 2023-02-20 PROCEDURE — 93005 ELECTROCARDIOGRAM TRACING: CPT

## 2023-02-20 PROCEDURE — 80053 COMPREHEN METABOLIC PANEL: CPT

## 2023-02-20 PROCEDURE — 97110 THERAPEUTIC EXERCISES: CPT

## 2023-02-20 PROCEDURE — 85025 COMPLETE CBC W/AUTO DIFF WBC: CPT

## 2023-02-20 PROCEDURE — 96360 HYDRATION IV INFUSION INIT: CPT

## 2023-02-20 PROCEDURE — 71045 X-RAY EXAM CHEST 1 VIEW: CPT

## 2023-02-20 PROCEDURE — 99285 EMERGENCY DEPT VISIT HI MDM: CPT | Mod: 25

## 2023-02-20 PROCEDURE — 85610 PROTHROMBIN TIME: CPT

## 2023-02-20 PROCEDURE — 81001 URINALYSIS AUTO W/SCOPE: CPT

## 2023-02-20 PROCEDURE — 87086 URINE CULTURE/COLONY COUNT: CPT

## 2023-02-20 PROCEDURE — 97161 PT EVAL LOW COMPLEX 20 MIN: CPT

## 2023-02-20 PROCEDURE — 85027 COMPLETE CBC AUTOMATED: CPT

## 2023-02-20 PROCEDURE — 85730 THROMBOPLASTIN TIME PARTIAL: CPT

## 2023-02-20 PROCEDURE — 84484 ASSAY OF TROPONIN QUANT: CPT

## 2023-02-20 PROCEDURE — 97116 GAIT TRAINING THERAPY: CPT

## 2023-02-20 PROCEDURE — 87635 SARS-COV-2 COVID-19 AMP PRB: CPT

## 2023-02-20 PROCEDURE — 36415 COLL VENOUS BLD VENIPUNCTURE: CPT

## 2023-02-20 PROCEDURE — 99239 HOSP IP/OBS DSCHRG MGMT >30: CPT

## 2023-02-20 RX ADMIN — FAMOTIDINE 20 MILLIGRAM(S): 10 INJECTION INTRAVENOUS at 11:31

## 2023-02-20 RX ADMIN — LOSARTAN POTASSIUM 50 MILLIGRAM(S): 100 TABLET, FILM COATED ORAL at 05:18

## 2023-02-20 RX ADMIN — Medication 81 MILLIGRAM(S): at 11:30

## 2023-02-20 RX ADMIN — RISPERIDONE 0.5 MILLIGRAM(S): 4 TABLET ORAL at 05:18

## 2023-02-20 RX ADMIN — Medication 25 MILLIGRAM(S): at 05:18

## 2023-02-20 NOTE — PROGRESS NOTE ADULT - SUBJECTIVE AND OBJECTIVE BOX
Neurology follow up note    GUNNER STEINBERG82yFemale      Interval History:    Patient feels ok no new complaints.    Allergies    No Known Allergies    Intolerances        MEDICATIONS    acetaminophen     Tablet .. 650 milliGRAM(s) Oral every 6 hours PRN  ALPRAZolam 0.25 milliGRAM(s) Oral at bedtime PRN  aspirin enteric coated 81 milliGRAM(s) Oral daily  atorvastatin 40 milliGRAM(s) Oral at bedtime  enoxaparin Injectable 40 milliGRAM(s) SubCutaneous every 24 hours  famotidine    Tablet 20 milliGRAM(s) Oral daily  losartan 100 milliGRAM(s) Oral daily  melatonin 5 milliGRAM(s) Oral at bedtime  metoprolol tartrate 25 milliGRAM(s) Oral two times a day  polyethylene glycol 3350 17 Gram(s) Oral daily  risperiDONE   Tablet 0.5 milliGRAM(s) Oral two times a day  traZODone 100 milliGRAM(s) Oral at bedtime              Vital Signs Last 24 Hrs  T(C): 36.7 (17 Feb 2023 09:22), Max: 36.9 (16 Feb 2023 17:36)  T(F): 98.1 (17 Feb 2023 09:22), Max: 98.5 (16 Feb 2023 17:36)  HR: 61 (17 Feb 2023 09:22) (60 - 72)  BP: 136/70 (17 Feb 2023 09:22) (136/70 - 190/71)  BP(mean): --  RR: 19 (17 Feb 2023 09:22) (18 - 19)  SpO2: 94% (17 Feb 2023 09:22) (94% - 97%)    Parameters below as of 16 Feb 2023 21:55  Patient On (Oxygen Delivery Method): room air    REVIEW OF SYSTEMS:  Limited secondary to the patient having underlying dementia but constitutionally, no history of fever, chills, or night sweats.  No headaches.  No blurry vision.  No chest pain.  No shortness of breath.  No numbness or tingling.  Musculoskeletal:  No joint pain.  Reflexes are very limited.    PHYSICAL EXAMINATION:    HEENT:  Head:  Normocephalic, atraumatic.  Eyes:  No scleral icterus.  Ears:  Hearing appeared to be intact.  NECK:  Supple.  CARDIOVASCULAR:  S1 and S2 heard.  RESPIRATORY:  Decreased breath sounds bilaterally.  ABDOMEN:  Soft and nontender.  EXTREMITIES:  No clubbing or cyanosis was noted.      NEUROLOGIC:  The patient awake in bed,   Upon conversing with the patient, she would go off on tangents.  Positive memory issues, does see things, which is not new.  Speech was fluent.  Smile symmetric, was able to make out the number of fingers in each eye.  Motor, bilateral upper 4/5.  Bilateral lower 4-/5.  No drift.       LABS:  CBC Full  -  ( 17 Feb 2023 07:59 )  WBC Count : 8.35 K/uL  RBC Count : 4.95 M/uL  Hemoglobin : 15.1 g/dL  Hematocrit : 47.0 %  Platelet Count - Automated : 255 K/uL  Mean Cell Volume : 94.9 fl  Mean Cell Hemoglobin : 30.5 pg  Mean Cell Hemoglobin Concentration : 32.1 gm/dL  Auto Neutrophil # : x  Auto Lymphocyte # : x  Auto Monocyte # : x  Auto Eosinophil # : x  Auto Basophil # : x  Auto Neutrophil % : x  Auto Lymphocyte % : x  Auto Monocyte % : x  Auto Eosinophil % : x  Auto Basophil % : x      02-17    141  |  105  |  21  ----------------------------<  104<H>  3.9   |  25  |  0.68    Ca    9.3      17 Feb 2023 07:59    TPro  7.0  /  Alb  3.4  /  TBili  0.5  /  DBili  x   /  AST  30  /  ALT  36  /  AlkPhos  100  02-17    Hemoglobin A1C:     LIVER FUNCTIONS - ( 17 Feb 2023 07:59 )  Alb: 3.4 g/dL / Pro: 7.0 g/dL / ALK PHOS: 100 U/L / ALT: 36 U/L DA / AST: 30 U/L / GGT: x           Vitamin B12         RADIOLOGY      ANALYSIS AND PLAN:  This is an 82-year-old with episode of altered mental status and lethargy.  For episode of altered mental status and lethargy, questionable this could be medication related.  .  Questionable this could be any type of underlying fluctuations in blood pressure or heart rate.  No prior history of underlying epilepsy and no seizure-like activity was reported.  There were no new clear signs on examination to suggest that a new cerebrovascular accident had ensued.  Telemetry evaluation as needed.  Monitor systolic blood pressure as needed.  For history of dementia, appears to be advanced, I would recommend supportive therapy.  For underlying confusional state with anxiety, I would recommend Psychiatry followup, adjustment of medications as needed.  For hyperlipidemia, continue the patient on statin.  overall more awake and interactive     seen with son today 2/17     Spoke with daughter-in-law at bedside, primary contact will be son, Christopher, [phone]; second will be daughter-in-law, Cathryn, [phone] 2/16 ; third will be other son, Bar, [phone]; fourth will be his wife, Celeste, [phone].    Greater than 45 minutes of time was spent with the patient, plan of care, reviewing data, with greater than 50% of the visit was spent counseling and/or coordinating care with multidisciplinary healthcare team  
Patient is a 82y Female with a known history of :    HPI:  82F Dementia, HTN, Anxiety and GERD brought in from the [facility] after being found today slumped in her chair and minimally responsive.  Patient unclear of why she's here and confused overall. Most of the history obtained by the daughter at the bedside. She was admitted here few weeks prior for presumed stroke and had an extensive workup that was negative.  Facility reported that patient was also bradycardic at the time.  No transfer sheet to review the events or any other data. Currently patient is in bed, confused but comfortable with HR in the 70's. Daughter reported that facility has been changing the medications in attempts for better behavioral control due to underlying dementia and has been increasing doseages. Appears Xanax was recently started.      Repeat CT imaging done today of head which is unchanged from prior.  (13 Feb 2023 15:45)      REVIEW OF SYSTEMS:    CONSTITUTIONAL: No fever, weight loss, or fatigue  EYES: No eye pain, visual disturbances, or discharge  ENMT:  No difficulty hearing, tinnitus, vertigo; No sinus or throat pain  NECK: No pain or stiffness  BREASTS: No pain, masses, or nipple discharge  RESPIRATORY: No cough, wheezing, chills or hemoptysis; No shortness of breath  CARDIOVASCULAR: No chest pain, palpitations, dizziness, or leg swelling  GASTROINTESTINAL: No abdominal or epigastric pain. No nausea, vomiting, or hematemesis; No diarrhea or constipation. No melena or hematochezia.  GENITOURINARY: No dysuria, frequency, hematuria, or incontinence  NEUROLOGICAL: No headaches, memory loss, loss of strength, numbness, or tremors  SKIN: No itching, burning, rashes, or lesions   LYMPH NODES: No enlarged glands  ENDOCRINE: No heat or cold intolerance; No hair loss  MUSCULOSKELETAL: No joint pain or swelling; No muscle, back, or extremity pain  PSYCHIATRIC: No depression, anxiety, mood swings, or difficulty sleeping  HEME/LYMPH: No easy bruising, or bleeding gums  ALLERGY AND IMMUNOLOGIC: No hives or eczema    MEDICATIONS  (STANDING):  aspirin enteric coated 81 milliGRAM(s) Oral daily  atorvastatin 40 milliGRAM(s) Oral at bedtime  enoxaparin Injectable 40 milliGRAM(s) SubCutaneous every 24 hours  famotidine    Tablet 20 milliGRAM(s) Oral daily  losartan 50 milliGRAM(s) Oral two times a day  melatonin 5 milliGRAM(s) Oral at bedtime  metoprolol tartrate 25 milliGRAM(s) Oral two times a day  polyethylene glycol 3350 17 Gram(s) Oral daily  risperiDONE   Tablet 0.5 milliGRAM(s) Oral two times a day  traZODone 100 milliGRAM(s) Oral at bedtime    MEDICATIONS  (PRN):  acetaminophen     Tablet .. 650 milliGRAM(s) Oral every 6 hours PRN Temp greater or equal to 38C (100.4F), Mild Pain (1 - 3)  ALPRAZolam 0.25 milliGRAM(s) Oral at bedtime PRN anxiety/agitation      ALLERGIES: No Known Allergies      FAMILY HISTORY:      Social history:  Alochol:   Smoking:   Drug Use:   Marital Status:     PHYSICAL EXAMINATION:  -----------------------------  T(C): 36.3 (02-19-23 @ 09:59), Max: 36.9 (02-18-23 @ 21:23)  HR: 58 (02-19-23 @ 09:59) (58 - 86)  BP: 114/70 (02-19-23 @ 09:59) (114/70 - 166/94)  RR: 17 (02-19-23 @ 09:59) (16 - 18)  SpO2: 97% (02-19-23 @ 09:59) (94% - 97%)  Wt(kg): --        Constitutional: well developed, normal appearance, well groomed, well nourished, no deformities and no acute distress.   Eyes: the conjunctiva exhibited no abnormalities and the eyelids demonstrated no xanthelasmas.   HEENT: normal oral mucosa, no oral pallor and no oral cyanosis.   Neck: normal jugular venous A waves present, normal jugular venous V waves present and no jugular venous richardson A waves.   Pulmonary: no respiratory distress, normal respiratory rhythm and effort, no accessory muscle use and lungs were clear to auscultation bilaterally. Anteriorly  Cardiovascular: heart rate and rhythm were normal, normal S1 and S2 and no murmur, gallop, rub, heave or thrill are present.    Musculoskeletal: the gait could not be assessed.   Extremities: no clubbing of the fingernails, no localized cyanosis, no petechial hemorrhages and no ischemic changes.   Skin: normal skin color and pigmentation, no rash, no venous stasis, no skin lesions, no skin ulcer and no xanthoma was observed.       LABS:   --------                       Radiology:    
Patient is a 82y Female with a known history of :    HPI:  82F Dementia, HTN, Anxiety and GERD brought in from the [facility] after being found today slumped in her chair and minimally responsive.  Patient unclear of why she's here and confused overall. Most of the history obtained by the daughter at the bedside. She was admitted here few weeks prior for presumed stroke and had an extensive workup that was negative.  Facility reported that patient was also bradycardic at the time.  No transfer sheet to review the events or any other data. Currently patient is in bed, confused but comfortable with HR in the 70's. Daughter reported that facility has been changing the medications in attempts for better behavioral control due to underlying dementia and has been increasing doseages. Appears Xanax was recently started.      Repeat CT imaging done today of head which is unchanged from prior.  (13 Feb 2023 15:45)      REVIEW OF SYSTEMS:    CONSTITUTIONAL: No fever, weight loss, or fatigue  EYES: No eye pain, visual disturbances, or discharge  ENMT:  No difficulty hearing, tinnitus, vertigo; No sinus or throat pain  NECK: No pain or stiffness  BREASTS: No pain, masses, or nipple discharge  RESPIRATORY: No cough, wheezing, chills or hemoptysis; No shortness of breath  CARDIOVASCULAR: No chest pain, palpitations, dizziness, or leg swelling  GASTROINTESTINAL: No abdominal or epigastric pain. No nausea, vomiting, or hematemesis; No diarrhea or constipation. No melena or hematochezia.  GENITOURINARY: No dysuria, frequency, hematuria, or incontinence  NEUROLOGICAL: No headaches, memory loss, loss of strength, numbness, or tremors  SKIN: No itching, burning, rashes, or lesions   LYMPH NODES: No enlarged glands  ENDOCRINE: No heat or cold intolerance; No hair loss  MUSCULOSKELETAL: No joint pain or swelling; No muscle, back, or extremity pain  PSYCHIATRIC: No depression, anxiety, mood swings, or difficulty sleeping  HEME/LYMPH: No easy bruising, or bleeding gums  ALLERGY AND IMMUNOLOGIC: No hives or eczema    MEDICATIONS  (STANDING):  aspirin enteric coated 81 milliGRAM(s) Oral daily  atorvastatin 40 milliGRAM(s) Oral at bedtime  enoxaparin Injectable 40 milliGRAM(s) SubCutaneous every 24 hours  famotidine    Tablet 20 milliGRAM(s) Oral daily  losartan 50 milliGRAM(s) Oral two times a day  melatonin 5 milliGRAM(s) Oral at bedtime  metoprolol tartrate 25 milliGRAM(s) Oral two times a day  polyethylene glycol 3350 17 Gram(s) Oral daily  risperiDONE   Tablet 0.5 milliGRAM(s) Oral two times a day  traZODone 100 milliGRAM(s) Oral at bedtime    MEDICATIONS  (PRN):  acetaminophen     Tablet .. 650 milliGRAM(s) Oral every 6 hours PRN Temp greater or equal to 38C (100.4F), Mild Pain (1 - 3)  ALPRAZolam 0.25 milliGRAM(s) Oral at bedtime PRN anxiety/agitation      ALLERGIES: No Known Allergies      FAMILY HISTORY:      Social history:  Alochol:   Smoking:   Drug Use:   Marital Status:     PHYSICAL EXAMINATION:  -----------------------------  T(C): 36.8 (02-18-23 @ 09:58), Max: 36.8 (02-18-23 @ 09:58)  HR: 54 (02-18-23 @ 09:58) (54 - 85)  BP: 96/60 (02-18-23 @ 09:58) (96/60 - 206/93)  RR: 18 (02-18-23 @ 09:58) (17 - 18)  SpO2: 94% (02-18-23 @ 09:58) (94% - 96%)  Wt(kg): --        Constitutional: well developed, normal appearance, well groomed, well nourished, no deformities and no acute distress.   Eyes: the conjunctiva exhibited no abnormalities and the eyelids demonstrated no xanthelasmas.   HEENT: normal oral mucosa, no oral pallor and no oral cyanosis.   Neck: normal jugular venous A waves present, normal jugular venous V waves present and no jugular venous richardson A waves.   Pulmonary: no respiratory distress, normal respiratory rhythm and effort, no accessory muscle use and lungs were clear to auscultation bilaterally. Anteriorly  Cardiovascular: heart rate and rhythm were normal, normal S1 and S2 and no murmur, gallop, rub, heave or thrill are present.    Musculoskeletal: the gait could not be assessed.   Extremities: no clubbing of the fingernails, no localized cyanosis, no petechial hemorrhages and no ischemic changes.   Skin: normal skin color and pigmentation, no rash, no venous stasis, no skin lesions, no skin ulcer and no xanthoma was observed.       LABS:   --------  02-17    141  |  105  |  21  ----------------------------<  104<H>  3.9   |  25  |  0.68    Ca    9.3      17 Feb 2023 07:59    TPro  7.0  /  Alb  3.4  /  TBili  0.5  /  DBili  x   /  AST  30  /  ALT  36  /  AlkPhos  100  02-17                         15.1   8.35  )-----------( 255      ( 17 Feb 2023 07:59 )             47.0                   Radiology:    < from: US Transthoracic Echocardiogram w/Doppler Complete (02.05.23 @ 21:29) >    ACC: [id number] EXAM:  US TTE W DOPPLER COMPLETE   ORDERED BY: SATNAM NAVARRETE     PROCEDURE DATE:  02/05/2023          INTERPRETATION:  Ordering Physician: SATNAM NAVARRETE [id number]    Indication: TIA    Technician: LYDIA    Study Quality: Good  A complete echocardiographic study was performed utilizing standard   protocol including spectral and color Doppler in all echocardiographic   windows.    Height: 157 cm  Weight: 60 kg  BSA: 1.6 m2  Blood Pressure: 135/66 mmHg    MEASUREMENTS  IVS: 1.3 cm  PWT: 1.2 cm  LA: 3.2 cm  AO: 2.6 cm  LVIDd: 2.9 cm  LVIDs: 2.0 cm    LVEF: 65%  RVSP: N/A  RA Pressure: 3 mmHg    FINDINGS  Left Ventricle: The left ventricle is normal in size. Increased left   ventricular wall thickness. The left ventricular systolicfunction is   normal with no wall motion abnormalities. Estimated EF is 65%.  Right Ventricle: The right ventricle is normal in size and function.  Left Atrium: The left atrium is normal in size.  Right Atrium: The right atrium is normal in size.  Mitral Valve: The mitral valve is structurally normal. Trace mitral   regurgitation.  Aortic Valve: Aortic valve sclerosis. Mild aortic regurgitation.  Tricuspid Valve: The tricuspid valve is structurally normal. Trace   tricuspid regurgitation. Inadequate tricuspid regurgitation to estimate   pulmonary artery systolic pressure.  Pulmonic Valve: The pulmonic valve is not well visualized. Mild pulmonic   regurgitation.  Diastolic Function: Impaired diastolic relaxation secondary to age.  Pericardium/Pleura: No pericardial effusion visualized.  Aorta: The aortic root is normal in size.    IMPRESSION:  1. Normal left ventricular systolic function.  2. Increased left ventricular wall thickness.    --- End of Report ---            TREMAYNE BROWN MD; Attending Cardiologist  This document has been electronically signed. Feb 6 2023  8:49AM    < end of copied text >  
Patient is a 82y old  Female who presents with a chief complaint of Bradycardia (15 Feb 2023 13:10)        HPI:  82F Dementia, HTN, Anxiety and GERD brought in from the [facility] after being found today slumped in her chair and minimally responsive.  Patient unclear of why she's here and confused overall. Most of the history obtained by the daughter at the bedside. She was admitted here few weeks prior for presumed stroke and had an extensive workup that was negative.  Facility reported that patient was also bradycardic at the time.  No transfer sheet to review the events or any other data. Currently patient is in bed, confused but comfortable with HR in the 70's. Daughter reported that facility has been changing the medications in attempts for better behavioral control due to underlying dementia and has been increasing doseages. Appears Xanax was recently started.      Repeat CT imaging done today of head which is unchanged from prior.  (13 Feb 2023 15:45)      SUBJECTIVE & OBJECTIVE: Pt seen and examined at bedside. nad    PHYSICAL EXAM:  T(C): 36.7 (02-16-23 @ 08:54), Max: 36.9 (02-15-23 @ 17:00)  HR: 59 (02-16-23 @ 08:54) (56 - 96)  BP: 171/79 (02-16-23 @ 08:54) (112/60 - 182/82)  RR: 16 (02-16-23 @ 08:54) (16 - 18)  SpO2: 94% (02-16-23 @ 08:54) (94% - 99%)  Wt(kg): --   GENERAL: NAD,  HEAD:  Atraumatic, Normocephalic  NECK: Supple, No JVD  NERVOUS SYSTEM:  Alert & Oriented X3,   CHEST/LUNG: Clear to auscultation bilaterally; No rales, rhonchi, wheezing, or rubs  HEART: Regular rate and rhythm; No murmurs, rubs, or gallops  ABDOMEN: Soft, Nontender, Nondistended; Bowel sounds present  EXTREMITIES:  2+ Peripheral Pulses, No clubbing, cyanosis, or edema        MEDICATIONS  (STANDING):  aspirin enteric coated 81 milliGRAM(s) Oral daily  atorvastatin 40 milliGRAM(s) Oral at bedtime  enoxaparin Injectable 40 milliGRAM(s) SubCutaneous every 24 hours  famotidine    Tablet 20 milliGRAM(s) Oral daily  losartan 100 milliGRAM(s) Oral daily  melatonin 5 milliGRAM(s) Oral at bedtime  metoprolol tartrate 25 milliGRAM(s) Oral two times a day  polyethylene glycol 3350 17 Gram(s) Oral daily  risperiDONE   Tablet 0.5 milliGRAM(s) Oral two times a day  traZODone 100 milliGRAM(s) Oral at bedtime    MEDICATIONS  (PRN):  acetaminophen     Tablet .. 650 milliGRAM(s) Oral every 6 hours PRN Temp greater or equal to 38C (100.4F), Mild Pain (1 - 3)  ALPRAZolam 0.25 milliGRAM(s) Oral at bedtime PRN anxiety/agitation      LABS:                        14.8   7.49  )-----------( 266      ( 15 Feb 2023 08:12 )             44.6     02-15    139  |  102  |  8   ----------------------------<  108<H>  3.7   |  24  |  0.56    Ca    9.3      15 Feb 2023 08:12    TPro  7.4  /  Alb  3.6  /  TBili  0.5  /  DBili  x   /  AST  26  /  ALT  26  /  AlkPhos  107  02-15          CAPILLARY BLOOD GLUCOSE          CAPILLARY BLOOD GLUCOSE        CAPILLARY BLOOD GLUCOSE                RECENT CULTURES:      RADIOLOGY & ADDITIONAL TESTS:                        DVT/GI ppx  Discussed with pt @ bedside
Patient is a 82y old  Female who presents with a chief complaint of Bradycardia (20 Feb 2023 10:40)      INTERVAL HPI/OVERNIGHT EVENTS:  Patient seen and examined in am, feels improved, confused, oriented x1, son at bedside, discussed plan.     ROS limited by dementia        Vital Signs Last 24 Hrs  T(C): 36.8 (20 Feb 2023 09:13), Max: 37.3 (19 Feb 2023 13:05)  T(F): 98.3 (20 Feb 2023 09:13), Max: 99.2 (19 Feb 2023 13:05)  HR: 80 (20 Feb 2023 09:13) (52 - 80)  BP: 136/71 (20 Feb 2023 09:13) (96/60 - 136/71)  BP(mean): 70 (19 Feb 2023 20:20) (70 - 70)  RR: 17 (20 Feb 2023 09:13) (16 - 18)  SpO2: 93% (20 Feb 2023 09:13) (93% - 96%)    Parameters below as of 20 Feb 2023 09:13  Patient On (Oxygen Delivery Method): room air        PHYSICAL EXAM:  GENERAL: NAD, elderly female  HEAD:  Atraumatic, Normocephalic  EYES: EOMI, PERRLA  ENMT: Moist mucous membranes,  No lesions   NECK: Supple, No JVD,   NERVOUS SYSTEM:  Alert & Oriented X1, no focal deficits noted, confused  CHEST/LUNG: Clear to auscultation bilaterally; No rales,   HEART: Regular rate and rhythm; No murmur   ABDOMEN: Soft, Nontender, Nondistended;   EXTREMITIES:  + Pulses, No clubbing,    SKIN: No rashes or lesions    MEDICATIONS  (STANDING):  aspirin enteric coated 81 milliGRAM(s) Oral daily  atorvastatin 40 milliGRAM(s) Oral at bedtime  enoxaparin Injectable 40 milliGRAM(s) SubCutaneous every 24 hours  famotidine    Tablet 20 milliGRAM(s) Oral daily  losartan 50 milliGRAM(s) Oral two times a day  melatonin 5 milliGRAM(s) Oral at bedtime  metoprolol tartrate 25 milliGRAM(s) Oral two times a day  polyethylene glycol 3350 17 Gram(s) Oral daily  risperiDONE   Tablet 0.5 milliGRAM(s) Oral two times a day  traZODone 100 milliGRAM(s) Oral at bedtime    MEDICATIONS  (PRN):  acetaminophen     Tablet .. 650 milliGRAM(s) Oral every 6 hours PRN Temp greater or equal to 38C (100.4F), Mild Pain (1 - 3)  ALPRAZolam 0.25 milliGRAM(s) Oral at bedtime PRN anxiety/agitation      Allergies    No Known Allergies    Intolerances          LABS:              CAPILLARY BLOOD GLUCOSE          RADIOLOGY & ADDITIONAL TESTS:        Care Discussed with Consultants/Other Providers:    Advanced Directives: [ ] DNR  [ ] No feeding tube  [ ] MOLST in chart  [ ] MOLST completed today  [ ] Unknown  
Patient is a 82y old  Female who presents with a chief complaint of Per H&P "82F Dementia, HTN, Anxiety and GERD brought in from the [facility] after being found today slumped in her chair and minimally responsive.  Patient unclear of why she's here and confused overall. Most of the history obtained by the daughter at the bedside. She was admitted here few weeks prior for presumed stroke and had an extensive workup that was negative.  Facility reported that patient was also bradycardic at the time.  No transfer sheet to review the events or any other data. Currently patient is in bed, confused but comfortable with HR in the 70's. Daughter reported that facility has been changing the medications in attempts for better behavioral control due to underlying dementia and has been increasing doseages. Appears Xanax was recently started."      (17 Feb 2023 14:34)      INTERVAL HPI/OVERNIGHT EVENTS:  Patient seen and examined in am, feels fair, no complaints. Denies pain, shortness of breath, dizziness.    ROS reviewed and is otherwise negative, limited by dementia        Vital Signs Last 24 Hrs  T(C): 36.8 (18 Feb 2023 09:58), Max: 36.8 (18 Feb 2023 09:58)  T(F): 98.3 (18 Feb 2023 09:58), Max: 98.3 (18 Feb 2023 09:58)  HR: 54 (18 Feb 2023 09:58) (54 - 85)  BP: 96/60 (18 Feb 2023 09:58) (96/60 - 206/93)  BP(mean): --  RR: 18 (18 Feb 2023 09:58) (17 - 18)  SpO2: 94% (18 Feb 2023 09:58) (94% - 96%)    Parameters below as of 18 Feb 2023 09:58  Patient On (Oxygen Delivery Method): room air        PHYSICAL EXAM:  GENERAL: NAD, elderly female  HEAD:  Atraumatic, Normocephalic  EYES: EOMI, PERRLA  ENMT: Moist mucous membranes, Good dentition, No lesions; No tonsillar erythema, exudates, or enlargement  NECK: Supple, No JVD   NERVOUS SYSTEM:  Alert & Oriented X2, Good concentration mild confusion, no focal deficits  CHEST/LUNG: Clear to auscultation bilaterally; No rales, rhonchi, wheezing, or rubs  HEART: Regular rate and rhythm; No murmurs, rubs, or gallops  ABDOMEN: Soft, Nontender, Nondistended; Bowel sounds present  EXTREMITIES:  + Pulses, trace edema  SKIN: No rashes or lesions    MEDICATIONS  (STANDING):  aspirin enteric coated 81 milliGRAM(s) Oral daily  atorvastatin 40 milliGRAM(s) Oral at bedtime  enoxaparin Injectable 40 milliGRAM(s) SubCutaneous every 24 hours  famotidine    Tablet 20 milliGRAM(s) Oral daily  losartan 50 milliGRAM(s) Oral two times a day  melatonin 5 milliGRAM(s) Oral at bedtime  metoprolol tartrate 25 milliGRAM(s) Oral two times a day  polyethylene glycol 3350 17 Gram(s) Oral daily  risperiDONE   Tablet 0.5 milliGRAM(s) Oral two times a day  traZODone 100 milliGRAM(s) Oral at bedtime    MEDICATIONS  (PRN):  acetaminophen     Tablet .. 650 milliGRAM(s) Oral every 6 hours PRN Temp greater or equal to 38C (100.4F), Mild Pain (1 - 3)  ALPRAZolam 0.25 milliGRAM(s) Oral at bedtime PRN anxiety/agitation      Allergies    No Known Allergies    Intolerances          LABS:      Ca    9.3        17 Feb 2023 07:59          CAPILLARY BLOOD GLUCOSE          RADIOLOGY & ADDITIONAL TESTS:        Care Discussed with Consultants/Other Providers:    Advanced Directives: [ ] DNR  [ ] No feeding tube  [ ] MOLST in chart  [ ] MOLST completed today  [ ] Unknown  
Patient is a 82y Female with a known history of :    HPI:  82F Dementia, HTN, Anxiety and GERD brought in from the [facility] after being found today slumped in her chair and minimally responsive.  Patient unclear of why she's here and confused overall. Most of the history obtained by the daughter at the bedside. She was admitted here few weeks prior for presumed stroke and had an extensive workup that was negative.  Facility reported that patient was also bradycardic at the time.  No transfer sheet to review the events or any other data. Currently patient is in bed, confused but comfortable with HR in the 70's. Daughter reported that facility has been changing the medications in attempts for better behavioral control due to underlying dementia and has been increasing doseages. Appears Xanax was recently started.      Repeat CT imaging done today of head which is unchanged from prior.  (13 Feb 2023 15:45)      REVIEW OF SYSTEMS:    CONSTITUTIONAL: No fever, weight loss, or fatigue  EYES: No eye pain, visual disturbances, or discharge  ENMT:  No difficulty hearing, tinnitus, vertigo; No sinus or throat pain  NECK: No pain or stiffness  BREASTS: No pain, masses, or nipple discharge  RESPIRATORY: No cough, wheezing, chills or hemoptysis; No shortness of breath  CARDIOVASCULAR: No chest pain, palpitations, dizziness, or leg swelling  GASTROINTESTINAL: No abdominal or epigastric pain. No nausea, vomiting, or hematemesis; No diarrhea or constipation. No melena or hematochezia.  GENITOURINARY: No dysuria, frequency, hematuria, or incontinence  NEUROLOGICAL: No headaches, memory loss, loss of strength, numbness, or tremors  SKIN: No itching, burning, rashes, or lesions   LYMPH NODES: No enlarged glands  ENDOCRINE: No heat or cold intolerance; No hair loss  MUSCULOSKELETAL: No joint pain or swelling; No muscle, back, or extremity pain  PSYCHIATRIC: No depression, anxiety, mood swings, or difficulty sleeping  HEME/LYMPH: No easy bruising, or bleeding gums  ALLERGY AND IMMUNOLOGIC: No hives or eczema    MEDICATIONS  (STANDING):  aspirin enteric coated 81 milliGRAM(s) Oral daily  atorvastatin 40 milliGRAM(s) Oral at bedtime  enoxaparin Injectable 40 milliGRAM(s) SubCutaneous every 24 hours  famotidine    Tablet 20 milliGRAM(s) Oral daily  losartan 50 milliGRAM(s) Oral two times a day  melatonin 5 milliGRAM(s) Oral at bedtime  metoprolol tartrate 25 milliGRAM(s) Oral two times a day  polyethylene glycol 3350 17 Gram(s) Oral daily  risperiDONE   Tablet 0.5 milliGRAM(s) Oral two times a day  traZODone 100 milliGRAM(s) Oral at bedtime    MEDICATIONS  (PRN):  acetaminophen     Tablet .. 650 milliGRAM(s) Oral every 6 hours PRN Temp greater or equal to 38C (100.4F), Mild Pain (1 - 3)  ALPRAZolam 0.25 milliGRAM(s) Oral at bedtime PRN anxiety/agitation      ALLERGIES: No Known Allergies      FAMILY HISTORY:      Social history:  Alochol:   Smoking:   Drug Use:   Marital Status:     PHYSICAL EXAMINATION:  -----------------------------  T(C): 36.8 (02-20-23 @ 09:13), Max: 37.3 (02-19-23 @ 13:05)  HR: 80 (02-20-23 @ 09:13) (52 - 80)  BP: 136/71 (02-20-23 @ 09:13) (96/60 - 136/71)  RR: 17 (02-20-23 @ 09:13) (16 - 18)  SpO2: 93% (02-20-23 @ 09:13) (93% - 96%)  Wt(kg): --        Constitutional: well developed, normal appearance, well groomed, well nourished, no deformities and no acute distress.   Eyes: the conjunctiva exhibited no abnormalities and the eyelids demonstrated no xanthelasmas.   HEENT: normal oral mucosa, no oral pallor and no oral cyanosis.   Neck: normal jugular venous A waves present, normal jugular venous V waves present and no jugular venous richardson A waves.   Pulmonary: no respiratory distress, normal respiratory rhythm and effort, no accessory muscle use and lungs were clear to auscultation bilaterally. Anteriorly  Cardiovascular: heart rate and rhythm were normal, normal S1 and S2 and no murmur, gallop, rub, heave or thrill are present.    Musculoskeletal: the gait could not be assessed.   Extremities: no clubbing of the fingernails, no localized cyanosis, no petechial hemorrhages and no ischemic changes.   Skin: normal skin color and pigmentation, no rash, no venous stasis, no skin lesions, no skin ulcer and no xanthoma was observed.       LABS:   --------                       Radiology:    
Subjective: Patient seen and examined. No overnight events.  Doing well.     MEDICATIONS  (STANDING):  aspirin enteric coated 81 milliGRAM(s) Oral daily  atorvastatin 40 milliGRAM(s) Oral at bedtime  enoxaparin Injectable 40 milliGRAM(s) SubCutaneous every 24 hours  famotidine    Tablet 20 milliGRAM(s) Oral daily  losartan 100 milliGRAM(s) Oral daily  melatonin 5 milliGRAM(s) Oral at bedtime  metoprolol tartrate 25 milliGRAM(s) Oral two times a day  polyethylene glycol 3350 17 Gram(s) Oral daily  risperiDONE   Tablet 0.5 milliGRAM(s) Oral two times a day  traZODone 100 milliGRAM(s) Oral at bedtime    MEDICATIONS  (PRN):  acetaminophen     Tablet .. 650 milliGRAM(s) Oral every 6 hours PRN Temp greater or equal to 38C (100.4F), Mild Pain (1 - 3)  ALPRAZolam 0.25 milliGRAM(s) Oral at bedtime PRN anxiety/agitation      Allergies    No Known Allergies    Intolerances        Vital Signs Last 24 Hrs  T(C): 36.7 (17 Feb 2023 09:22), Max: 36.9 (16 Feb 2023 17:36)  T(F): 98.1 (17 Feb 2023 09:22), Max: 98.5 (16 Feb 2023 17:36)  HR: 61 (17 Feb 2023 09:22) (60 - 72)  BP: 136/70 (17 Feb 2023 09:22) (136/70 - 190/71)  BP(mean): --  RR: 19 (17 Feb 2023 09:22) (18 - 19)  SpO2: 94% (17 Feb 2023 09:22) (94% - 97%)    Parameters below as of 16 Feb 2023 21:55  Patient On (Oxygen Delivery Method): room air        PHYSICAL EXAM:  GENERAL: NAD, well-groomed, well-developed  HEAD:  Atraumatic, Normocephalic  ENMT: Moist mucous membranes,   NECK: Supple, No JVD, Normal thyroid  NERVOUS SYSTEM:  All 4 extremities mobile, no gross sensory deficits.   CHEST/LUNG: Clear to auscultation bilaterally; No rales, rhonchi, wheezing, or rubs  HEART: Regular rate and rhythm; No murmurs, rubs, or gallops  ABDOMEN: Soft, Nontender, Nondistended; Bowel sounds present  EXTREMITIES:  2+ Peripheral Pulses, No clubbing, cyanosis, or edema      LABS:                        15.1   8.35  )-----------( 255      ( 17 Feb 2023 07:59 )             47.0     17 Feb 2023 07:59    141    |  105    |  21     ----------------------------<  104    3.9     |  25     |  0.68     Ca    9.3        17 Feb 2023 07:59    TPro  7.0    /  Alb  3.4    /  TBili  0.5    /  DBili  x      /  AST  30     /  ALT  36     /  AlkPhos  100    17 Feb 2023 07:59        CAPILLARY BLOOD GLUCOSE          RADIOLOGY & ADDITIONAL TESTS:    Imaging Personally Reviewed:  [ ] YES     Consultant(s) Notes Reviewed:      Care Discussed with Consultants/Other Providers:    Advanced Directives: [ ] DNR  [ ] No feeding tube  [ ] MOLST in chart  [ ] MOLST completed today  [ ] Unknown  
Chief Complaint: AMS, possible syncope    Interval Events: No events overnight.    Review of Systems:  General: No fevers, chills, weight gain  Skin: No rashes, color changes  Cardiovascular: No chest pain, orthopnea  Respiratory: No shortness of breath, cough  Gastrointestinal: No nausea, abdominal pain  Genitourinary: No incontinence, pain with urination  Musculoskeletal: No pain, swelling, decreased range of motion  Neurological: No headache, weakness  Psychiatric: No depression, anxiety  Endocrine: No weight gain, increased thirst  All other systems are comprehensively negative.    Physical Exam:  Vital Signs Last 24 Hrs  T(C): 36.3 (15 Feb 2023 05:05), Max: 36.9 (14 Feb 2023 14:23)  T(F): 97.3 (15 Feb 2023 05:05), Max: 98.5 (14 Feb 2023 17:13)  HR: 81 (15 Feb 2023 07:00) (61 - 98)  BP: 170/75 (15 Feb 2023 05:05) (146/80 - 188/93)  BP(mean): --  RR: 18 (15 Feb 2023 05:05) (15 - 18)  SpO2: 97% (15 Feb 2023 05:05) (93% - 97%)  Parameters below as of 14 Feb 2023 17:13  Patient On (Oxygen Delivery Method): room air  General: NAD  HEENT: MMM  Neck: No JVD, no carotid bruit  Lungs: CTAB  CV: RRR, nl S1/S2, no M/R/G  Abdomen: S/NT/ND, +BS  Extremities: No LE edema, no cyanosis  Neuro: AAOx2  Skin: No rash    Labs:    02-15    139  |  102  |  8   ----------------------------<  108<H>  3.7   |  24  |  0.56    Ca    9.3      15 Feb 2023 08:12    TPro  7.4  /  Alb  3.6  /  TBili  0.5  /  DBili  x   /  AST  26  /  ALT  26  /  AlkPhos  107  02-15                        14.8   7.49  )-----------( 266      ( 15 Feb 2023 08:12 )             44.6       ECG/Telemetry: Sinus rhythm
Chief Complaint: AMS, possible syncope    Interval Events: No events overnight.    Review of Systems:  General: No fevers, chills, weight gain  Skin: No rashes, color changes  Cardiovascular: No chest pain, orthopnea  Respiratory: No shortness of breath, cough  Gastrointestinal: No nausea, abdominal pain  Genitourinary: No incontinence, pain with urination  Musculoskeletal: No pain, swelling, decreased range of motion  Neurological: No headache, weakness  Psychiatric: No depression, anxiety  Endocrine: No weight gain, increased thirst  All other systems are comprehensively negative.    Physical Exam:  Vital Signs Last 24 Hrs  T(C): 36.4 (17 Feb 2023 04:55), Max: 36.9 (16 Feb 2023 17:36)  T(F): 97.6 (17 Feb 2023 04:55), Max: 98.5 (16 Feb 2023 17:36)  HR: 60 (17 Feb 2023 04:55) (59 - 72)  BP: 153/73 (17 Feb 2023 04:55) (137/70 - 190/71)  BP(mean): --  RR: 18 (17 Feb 2023 04:55) (16 - 18)  SpO2: 96% (17 Feb 2023 04:55) (94% - 97%)  Parameters below as of 16 Feb 2023 21:55  Patient On (Oxygen Delivery Method): room air  General: NAD  HEENT: MMM  Neck: No JVD, no carotid bruit  Lungs: CTAB  CV: RRR, nl S1/S2, no M/R/G  Abdomen: S/NT/ND, +BS  Extremities: No LE edema, no cyanosis  Neuro: AAOx2  Skin: No rash    Labs:    02-17    141  |  105  |  21  ----------------------------<  104<H>  3.9   |  25  |  0.68    Ca    9.3      17 Feb 2023 07:59    TPro  7.0  /  Alb  3.4  /  TBili  0.5  /  DBili  x   /  AST  30  /  ALT  36  /  AlkPhos  100  02-17                        15.1   8.35  )-----------( 255      ( 17 Feb 2023 07:59 )             47.0       ECG/Telemetry: Sinus rhythm, atrial run
Chief Complaint: AMS, possible syncope    Interval Events: No events overnight.    Review of Systems:  General: No fevers, chills, weight gain  Skin: No rashes, color changes  Cardiovascular: No chest pain, orthopnea  Respiratory: No shortness of breath, cough  Gastrointestinal: No nausea, abdominal pain  Genitourinary: No incontinence, pain with urination  Musculoskeletal: No pain, swelling, decreased range of motion  Neurological: No headache, weakness  Psychiatric: No depression, anxiety  Endocrine: No weight gain, increased thirst  All other systems are comprehensively negative.    Physical Exam:  Vital Signs Last 24 Hrs  T(C): 36.8 (16 Feb 2023 07:54), Max: 36.9 (15 Feb 2023 17:00)  T(F): 98.3 (16 Feb 2023 07:54), Max: 98.5 (15 Feb 2023 17:00)  HR: 56 (16 Feb 2023 07:54) (56 - 96)  BP: 161/77 (16 Feb 2023 07:54) (112/60 - 182/82)  BP(mean): --  RR: 18 (16 Feb 2023 07:54) (17 - 18)  SpO2: 98% (16 Feb 2023 07:54) (96% - 99%)  Parameters below as of 16 Feb 2023 07:54  Patient On (Oxygen Delivery Method): room air  General: NAD  HEENT: MMM  Neck: No JVD, no carotid bruit  Lungs: CTAB  CV: RRR, nl S1/S2, no M/R/G  Abdomen: S/NT/ND, +BS  Extremities: No LE edema, no cyanosis  Neuro: AAOx2  Skin: No rash    Labs:    02-15    139  |  102  |  8   ----------------------------<  108<H>  3.7   |  24  |  0.56    Ca    9.3      15 Feb 2023 08:12    TPro  7.4  /  Alb  3.6  /  TBili  0.5  /  DBili  x   /  AST  26  /  ALT  26  /  AlkPhos  107  02-15                        14.8   7.49  )-----------( 266      ( 15 Feb 2023 08:12 )             44.6     ECG/Telemetry: Sinus rhythm, atrial run
Chief Complaint: AMS, possible syncope    Interval Events: No events overnight.    Review of Systems:  General: No fevers, chills, weight gain  Skin: No rashes, color changes  Cardiovascular: No chest pain, orthopnea  Respiratory: No shortness of breath, cough  Gastrointestinal: No nausea, abdominal pain  Genitourinary: No incontinence, pain with urination  Musculoskeletal: No pain, swelling, decreased range of motion  Neurological: No headache, weakness  Psychiatric: No depression, anxiety  Endocrine: No weight gain, increased thirst  All other systems are comprehensively negative.    Physical Exam:  Vitals:        Vital Signs Last 24 Hrs  T(C): 36.3 (14 Feb 2023 06:34), Max: 37 (13 Feb 2023 18:02)  T(F): 97.4 (14 Feb 2023 06:34), Max: 98.6 (13 Feb 2023 18:02)  HR: 86 (14 Feb 2023 06:34) (50 - 86)  BP: 186/86 (14 Feb 2023 06:34) (122/66 - 186/86)  BP(mean): --  RR: 18 (14 Feb 2023 06:34) (14 - 18)  SpO2: 98% (14 Feb 2023 06:34) (96% - 99%)  Parameters below as of 13 Feb 2023 21:06  Patient On (Oxygen Delivery Method): room air  General: NAD  HEENT: MMM  Neck: No JVD, no carotid bruit  Lungs: CTAB  CV: RRR, nl S1/S2, no M/R/G  Abdomen: S/NT/ND, +BS  Extremities: No LE edema, no cyanosis  Neuro: AAOx2  Skin: No rash    Labs:                        13.0   9.95  )-----------( 232      ( 13 Feb 2023 13:02 )             40.2     02-13    143  |  107  |  19  ----------------------------<  128<H>  3.6   |  28  |  0.95    Ca    9.2      13 Feb 2023 13:02    TPro  7.0  /  Alb  3.3  /  TBili  0.4  /  DBili  x   /  AST  20  /  ALT  23  /  AlkPhos  97  02-13        PT/INR - ( 13 Feb 2023 13:02 )   PT: 13.0 sec;   INR: 1.13 ratio         PTT - ( 13 Feb 2023 13:02 )  PTT:37.2 sec    ECG/Telemetry: Sinus rhythm
Neurology follow up note    GUNNER [id number]      Interval History:    Patient feels ok no new complaints seen with family     Allergies    No Known Allergies    Intolerances        MEDICATIONS    acetaminophen     Tablet .. 650 milliGRAM(s) Oral every 6 hours PRN  aspirin enteric coated 81 milliGRAM(s) Oral daily  atorvastatin 40 milliGRAM(s) Oral at bedtime  enoxaparin Injectable 40 milliGRAM(s) SubCutaneous every 24 hours  famotidine    Tablet 20 milliGRAM(s) Oral daily  losartan 50 milliGRAM(s) Oral daily  melatonin 5 milliGRAM(s) Oral at bedtime  metoprolol tartrate 25 milliGRAM(s) Oral two times a day  polyethylene glycol 3350 17 Gram(s) Oral daily  potassium chloride    Tablet ER 40 milliEquivalent(s) Oral once  risperiDONE   Tablet 0.5 milliGRAM(s) Oral two times a day  traZODone 50 milliGRAM(s) Oral at bedtime          Height (cm): 157.5 ( @ 18:02)  Weight (kg): 64.3 ( @ 18:02)  BMI (kg/m2): 25.9 ( @ 18:02)    Vital Signs Last 24 Hrs  T(C): 36.3 (2023 11:02), Max: 37 (2023 18:02)  T(F): 97.4 (2023 11:02), Max: 98.6 (2023 18:02)  HR: 75 (2023 11:02) (50 - 86)  BP: 178/83 (2023 11:02) (122/66 - 186/86)  BP(mean): --  RR: 18 (2023 11:02) (14 - 18)  SpO2: 95% (2023 11:02) (95% - 99%)    Parameters below as of 2023 11:02  Patient On (Oxygen Delivery Method): room air    REVIEW OF SYSTEMS:  Limited secondary to the patient having underlying dementia but constitutionally, no history of fever, chills, or night sweats.  No headaches.  No blurry vision.  No chest pain.  No shortness of breath.  No numbness or tingling.  Musculoskeletal:  No joint pain.  Reflexes are very limited.    PHYSICAL EXAMINATION:    HEENT:  Head:  Normocephalic, atraumatic.  Eyes:  No scleral icterus.  Ears:  Hearing appeared to be intact.  NECK:  Supple.  CARDIOVASCULAR:  S1 and S2 heard.  RESPIRATORY:  Decreased breath sounds bilaterally.  ABDOMEN:  Soft and nontender.  EXTREMITIES:  No clubbing or cyanosis was noted.      NEUROLOGIC:  The patient awake in bed,   Upon conversing with the patient, she would go off on tangents.  Positive memory issues, does see things, which is not new.  Speech was fluent.  Smile symmetric, was able to make out the number of fingers in each eye.  Motor, bilateral upper 4/5.  Bilateral lower 4-/5.  No drift.                   LABS:  CBC Full  -  ( 2023 08:04 )  WBC Count : 6.60 K/uL  RBC Count : 4.25 M/uL  Hemoglobin : 13.0 g/dL  Hematocrit : 40.7 %  Platelet Count - Automated : 218 K/uL  Mean Cell Volume : 95.8 fl  Mean Cell Hemoglobin : 30.6 pg  Mean Cell Hemoglobin Concentration : 31.9 gm/dL  Auto Neutrophil # : 3.67 K/uL  Auto Lymphocyte # : 1.92 K/uL  Auto Monocyte # : 0.54 K/uL  Auto Eosinophil # : 0.40 K/uL  Auto Basophil # : 0.03 K/uL  Auto Neutrophil % : 55.5 %  Auto Lymphocyte % : 29.1 %  Auto Monocyte % : 8.2 %  Auto Eosinophil % : 6.1 %  Auto Basophil % : 0.5 %    Urinalysis Basic - ( 2023 12:32 )    Color: Yellow / Appearance: Clear / S.015 / pH: x  Gluc: x / Ketone: Negative  / Bili: Negative / Urobili: Negative mg/dL   Blood: x / Protein: 30 mg/dL / Nitrite: Negative   Leuk Esterase: Trace / RBC: Negative /HPF / WBC 0-2   Sq Epi: x / Non Sq Epi: Occasional / Bacteria: Negative          142  |  108  |  13  ----------------------------<  93  3.4<L>   |  24  |  0.57    Ca    8.7      2023 08:04    TPro  6.5  /  Alb  2.9<L>  /  TBili  0.5  /  DBili  x   /  AST  18  /  ALT  19  /  AlkPhos  87  02-14    Hemoglobin A1C:     LIVER FUNCTIONS - ( 2023 08:04 )  Alb: 2.9 g/dL / Pro: 6.5 g/dL / ALK PHOS: 87 U/L / ALT: 19 U/L DA / AST: 18 U/L / GGT: x           Vitamin B12   PT/INR - ( 2023 13:02 )   PT: 13.0 sec;   INR: 1.13 ratio         PTT - ( 2023 13:02 )  PTT:37.2 sec      RADIOLOGY    ANALYSIS AND PLAN:  This is an 82-year-old with episode of altered mental status and lethargy.  For episode of altered mental status and lethargy, questionable this could be medication related.  .  Questionable this could be any type of underlying fluctuations in blood pressure or heart rate.  No prior history of underlying epilepsy and no seizure-like activity was reported.  There were no new clear signs on examination to suggest that a new cerebrovascular accident had ensued.  Telemetry evaluation as needed.  Monitor systolic blood pressure as needed.  For history of dementia, appears to be advanced, I would recommend supportive therapy.  For underlying confusional state with anxiety, I would recommend Psychiatry followup, adjustment of medications as needed.  For hyperlipidemia, continue the patient on statin.  overall more awake and interactive     Spoke with daughter-in-law at bedside, primary contact will be son, Christopher, [phone]; second will be daughter-in-law, Cathryn, [phone]; third will be other son, Bar, [phone]; fourth will be his wife, Celeste, [phone].    Greater than 45 minutes of time was spent with the patient, plan of care, reviewing data, with greater than 50% of the visit was spent counseling and/or coordinating care with multidisciplinary healthcare team  
Neurology follow up note    GUNNER [id number]      Interval History:    Patient feels ok no new complaints.    Allergies    No Known Allergies    Intolerances        MEDICATIONS    acetaminophen     Tablet .. 650 milliGRAM(s) Oral every 6 hours PRN  ALPRAZolam 0.25 milliGRAM(s) Oral at bedtime PRN  aspirin enteric coated 81 milliGRAM(s) Oral daily  atorvastatin 40 milliGRAM(s) Oral at bedtime  enoxaparin Injectable 40 milliGRAM(s) SubCutaneous every 24 hours  famotidine    Tablet 20 milliGRAM(s) Oral daily  losartan 100 milliGRAM(s) Oral daily  melatonin 5 milliGRAM(s) Oral at bedtime  metoprolol tartrate 25 milliGRAM(s) Oral two times a day  polyethylene glycol 3350 17 Gram(s) Oral daily  risperiDONE   Tablet 0.5 milliGRAM(s) Oral two times a day  traZODone 100 milliGRAM(s) Oral at bedtime              Vital Signs Last 24 Hrs  T(C): 36.7 (16 Feb 2023 08:54), Max: 36.9 (15 Feb 2023 17:00)  T(F): 98 (16 Feb 2023 08:54), Max: 98.5 (15 Feb 2023 17:00)  HR: 59 (16 Feb 2023 08:54) (56 - 96)  BP: 171/79 (16 Feb 2023 08:54) (112/60 - 182/82)  BP(mean): --  RR: 16 (16 Feb 2023 08:54) (16 - 18)  SpO2: 94% (16 Feb 2023 08:54) (94% - 99%)    Parameters below as of 16 Feb 2023 08:54  Patient On (Oxygen Delivery Method): room air    REVIEW OF SYSTEMS:  Limited secondary to the patient having underlying dementia but constitutionally, no history of fever, chills, or night sweats.  No headaches.  No blurry vision.  No chest pain.  No shortness of breath.  No numbness or tingling.  Musculoskeletal:  No joint pain.  Reflexes are very limited.    PHYSICAL EXAMINATION:    HEENT:  Head:  Normocephalic, atraumatic.  Eyes:  No scleral icterus.  Ears:  Hearing appeared to be intact.  NECK:  Supple.  CARDIOVASCULAR:  S1 and S2 heard.  RESPIRATORY:  Decreased breath sounds bilaterally.  ABDOMEN:  Soft and nontender.  EXTREMITIES:  No clubbing or cyanosis was noted.      NEUROLOGIC:  The patient awake in bed,   Upon conversing with the patient, she would go off on tangents.  Positive memory issues, does see things, which is not new.  Speech was fluent.  Smile symmetric, was able to make out the number of fingers in each eye.  Motor, bilateral upper 4/5.  Bilateral lower 4-/5.  No drift.                     LABS:  CBC Full  -  ( 15 Feb 2023 08:12 )  WBC Count : 7.49 K/uL  RBC Count : 4.82 M/uL  Hemoglobin : 14.8 g/dL  Hematocrit : 44.6 %  Platelet Count - Automated : 266 K/uL  Mean Cell Volume : 92.5 fl  Mean Cell Hemoglobin : 30.7 pg  Mean Cell Hemoglobin Concentration : 33.2 gm/dL  Auto Neutrophil # : 5.05 K/uL  Auto Lymphocyte # : 1.51 K/uL  Auto Monocyte # : 0.55 K/uL  Auto Eosinophil # : 0.28 K/uL  Auto Basophil # : 0.05 K/uL  Auto Neutrophil % : 67.4 %  Auto Lymphocyte % : 20.2 %  Auto Monocyte % : 7.3 %  Auto Eosinophil % : 3.7 %  Auto Basophil % : 0.7 %      02-15    139  |  102  |  8   ----------------------------<  108<H>  3.7   |  24  |  0.56    Ca    9.3      15 Feb 2023 08:12    TPro  7.4  /  Alb  3.6  /  TBili  0.5  /  DBili  x   /  AST  26  /  ALT  26  /  AlkPhos  107  02-15    Hemoglobin A1C:     LIVER FUNCTIONS - ( 15 Feb 2023 08:12 )  Alb: 3.6 g/dL / Pro: 7.4 g/dL / ALK PHOS: 107 U/L / ALT: 26 U/L DA / AST: 26 U/L / GGT: x           Vitamin B12         RADIOLOGY      ANALYSIS AND PLAN:  This is an 82-year-old with episode of altered mental status and lethargy.  For episode of altered mental status and lethargy, questionable this could be medication related.  .  Questionable this could be any type of underlying fluctuations in blood pressure or heart rate.  No prior history of underlying epilepsy and no seizure-like activity was reported.  There were no new clear signs on examination to suggest that a new cerebrovascular accident had ensued.  Telemetry evaluation as needed.  Monitor systolic blood pressure as needed.  For history of dementia, appears to be advanced, I would recommend supportive therapy.  For underlying confusional state with anxiety, I would recommend Psychiatry followup, adjustment of medications as needed.  For hyperlipidemia, continue the patient on statin.  overall more awake and interactive     Spoke with daughter-in-law at bedside, primary contact will be son, Christopher, [phone]; second will be daughter-in-law, Cathryn, [phone] 2/16 ; third will be other son, Bar, [phone]; fourth will be his wife, Celeste, [phone].    Greater than 45 minutes of time was spent with the patient, plan of care, reviewing data, with greater than 50% of the visit was spent counseling and/or coordinating care with multidisciplinary healthcare team
Neurology follow up note    GUNNER [id number]      Interval History:    Patient feels ok no new complaints.    Allergies    No Known Allergies    Intolerances        MEDICATIONS    acetaminophen     Tablet .. 650 milliGRAM(s) Oral every 6 hours PRN  ALPRAZolam 0.25 milliGRAM(s) Oral at bedtime PRN  aspirin enteric coated 81 milliGRAM(s) Oral daily  atorvastatin 40 milliGRAM(s) Oral at bedtime  enoxaparin Injectable 40 milliGRAM(s) SubCutaneous every 24 hours  famotidine    Tablet 20 milliGRAM(s) Oral daily  losartan 100 milliGRAM(s) Oral daily  melatonin 5 milliGRAM(s) Oral at bedtime  metoprolol tartrate 25 milliGRAM(s) Oral two times a day  polyethylene glycol 3350 17 Gram(s) Oral daily  risperiDONE   Tablet 0.5 milliGRAM(s) Oral two times a day  traZODone 50 milliGRAM(s) Oral at bedtime              Vital Signs Last 24 Hrs  T(C): 36.7 (15 Feb 2023 08:13), Max: 36.9 (14 Feb 2023 14:23)  T(F): 98 (15 Feb 2023 08:13), Max: 98.5 (14 Feb 2023 17:13)  HR: 60 (15 Feb 2023 08:13) (60 - 98)  BP: 141/- (15 Feb 2023 08:13) (141/- - 188/93)  BP(mean): --  RR: 18 (15 Feb 2023 08:13) (15 - 18)  SpO2: 97% (15 Feb 2023 08:13) (93% - 97%)    Parameters below as of 15 Feb 2023 08:13  Patient On (Oxygen Delivery Method): room air      REVIEW OF SYSTEMS:  Limited secondary to the patient having underlying dementia but constitutionally, no history of fever, chills, or night sweats.  No headaches.  No blurry vision.  No chest pain.  No shortness of breath.  No numbness or tingling.  Musculoskeletal:  No joint pain.  Reflexes are very limited.    PHYSICAL EXAMINATION:    HEENT:  Head:  Normocephalic, atraumatic.  Eyes:  No scleral icterus.  Ears:  Hearing appeared to be intact.  NECK:  Supple.  CARDIOVASCULAR:  S1 and S2 heard.  RESPIRATORY:  Decreased breath sounds bilaterally.  ABDOMEN:  Soft and nontender.  EXTREMITIES:  No clubbing or cyanosis was noted.      NEUROLOGIC:  The patient awake in bed,   Upon conversing with the patient, she would go off on tangents.  Positive memory issues, does see things, which is not new.  Speech was fluent.  Smile symmetric, was able to make out the number of fingers in each eye.  Motor, bilateral upper 4/5.  Bilateral lower 4-/5.  No drift.                 LABS:  CBC Full  -  ( 15 Feb 2023 08:12 )  WBC Count : 7.49 K/uL  RBC Count : 4.82 M/uL  Hemoglobin : 14.8 g/dL  Hematocrit : 44.6 %  Platelet Count - Automated : 266 K/uL  Mean Cell Volume : 92.5 fl  Mean Cell Hemoglobin : 30.7 pg  Mean Cell Hemoglobin Concentration : 33.2 gm/dL  Auto Neutrophil # : 5.05 K/uL  Auto Lymphocyte # : 1.51 K/uL  Auto Monocyte # : 0.55 K/uL  Auto Eosinophil # : 0.28 K/uL  Auto Basophil # : 0.05 K/uL  Auto Neutrophil % : 67.4 %  Auto Lymphocyte % : 20.2 %  Auto Monocyte % : 7.3 %  Auto Eosinophil % : 3.7 %  Auto Basophil % : 0.7 %      02-15    139  |  102  |  8   ----------------------------<  108<H>  3.7   |  24  |  0.56    Ca    9.3      15 Feb 2023 08:12    TPro  7.4  /  Alb  3.6  /  TBili  0.5  /  DBili  x   /  AST  26  /  ALT  26  /  AlkPhos  107  02-15    Hemoglobin A1C:     LIVER FUNCTIONS - ( 15 Feb 2023 08:12 )  Alb: 3.6 g/dL / Pro: 7.4 g/dL / ALK PHOS: 107 U/L / ALT: 26 U/L DA / AST: 26 U/L / GGT: x           Vitamin B12         RADIOLOGY  ANALYSIS AND PLAN:  This is an 82-year-old with episode of altered mental status and lethargy.  For episode of altered mental status and lethargy, questionable this could be medication related.  .  Questionable this could be any type of underlying fluctuations in blood pressure or heart rate.  No prior history of underlying epilepsy and no seizure-like activity was reported.  There were no new clear signs on examination to suggest that a new cerebrovascular accident had ensued.  Telemetry evaluation as needed.  Monitor systolic blood pressure as needed.  For history of dementia, appears to be advanced, I would recommend supportive therapy.  For underlying confusional state with anxiety, I would recommend Psychiatry followup, adjustment of medications as needed.  For hyperlipidemia, continue the patient on statin.  overall more awake and interactive     Spoke with daughter-in-law at bedside, primary contact will be son, Christopher, [phone]; second will be daughter-in-law, Cathryn, [phone]; third will be other son, Bar, [phone]; fourth will be his wife, Celeste, [phone].    Greater than 45 minutes of time was spent with the patient, plan of care, reviewing data, with greater than 50% of the visit was spent counseling and/or coordinating care with multidisciplinary healthcare team    
Neurology follow up note    GUNNER STEINBERG82yFemale      Interval History:    Patient feels ok no new complaints.    Allergies    No Known Allergies    Intolerances        MEDICATIONS    acetaminophen     Tablet .. 650 milliGRAM(s) Oral every 6 hours PRN  ALPRAZolam 0.25 milliGRAM(s) Oral at bedtime PRN  aspirin enteric coated 81 milliGRAM(s) Oral daily  atorvastatin 40 milliGRAM(s) Oral at bedtime  enoxaparin Injectable 40 milliGRAM(s) SubCutaneous every 24 hours  famotidine    Tablet 20 milliGRAM(s) Oral daily  losartan 50 milliGRAM(s) Oral two times a day  melatonin 5 milliGRAM(s) Oral at bedtime  metoprolol tartrate 25 milliGRAM(s) Oral two times a day  polyethylene glycol 3350 17 Gram(s) Oral daily  risperiDONE   Tablet 0.5 milliGRAM(s) Oral two times a day  traZODone 100 milliGRAM(s) Oral at bedtime              Vital Signs Last 24 Hrs  T(C): 36.8 (18 Feb 2023 09:58), Max: 36.8 (18 Feb 2023 09:58)  T(F): 98.3 (18 Feb 2023 09:58), Max: 98.3 (18 Feb 2023 09:58)  HR: 54 (18 Feb 2023 09:58) (54 - 85)  BP: 96/60 (18 Feb 2023 09:58) (96/60 - 206/93)  BP(mean): --  RR: 18 (18 Feb 2023 09:58) (17 - 18)  SpO2: 94% (18 Feb 2023 09:58) (94% - 96%)    Parameters below as of 18 Feb 2023 09:58  Patient On (Oxygen Delivery Method): room air      REVIEW OF SYSTEMS:  Limited secondary to the patient having underlying dementia but constitutionally, no history of fever, chills, or night sweats.  No headaches.  No blurry vision.  No chest pain.  No shortness of breath.  No numbness or tingling.  Musculoskeletal:  No joint pain.  Reflexes are very limited.    PHYSICAL EXAMINATION:    HEENT:  Head:  Normocephalic, atraumatic.  Eyes:  No scleral icterus.  Ears:  Hearing appeared to be intact.  NECK:  Supple.  CARDIOVASCULAR:  S1 and S2 heard.  RESPIRATORY:  Decreased breath sounds bilaterally.  ABDOMEN:  Soft and nontender.  EXTREMITIES:  No clubbing or cyanosis was noted.      NEUROLOGIC:  The patient awake in bed,   Upon conversing with the patient, she would go off on tangents.  Positive memory issues, does see things, which is not new.  Speech was fluent.  Smile symmetric, was able to make out the number of fingers in each eye.  Motor, bilateral upper 4/5.  Bilateral lower 4-/5.  No drift.                   LABS:  CBC Full  -  ( 17 Feb 2023 07:59 )  WBC Count : 8.35 K/uL  RBC Count : 4.95 M/uL  Hemoglobin : 15.1 g/dL  Hematocrit : 47.0 %  Platelet Count - Automated : 255 K/uL  Mean Cell Volume : 94.9 fl  Mean Cell Hemoglobin : 30.5 pg  Mean Cell Hemoglobin Concentration : 32.1 gm/dL  Auto Neutrophil # : x  Auto Lymphocyte # : x  Auto Monocyte # : x  Auto Eosinophil # : x  Auto Basophil # : x  Auto Neutrophil % : x  Auto Lymphocyte % : x  Auto Monocyte % : x  Auto Eosinophil % : x  Auto Basophil % : x      02-17    141  |  105  |  21  ----------------------------<  104<H>  3.9   |  25  |  0.68    Ca    9.3      17 Feb 2023 07:59    TPro  7.0  /  Alb  3.4  /  TBili  0.5  /  DBili  x   /  AST  30  /  ALT  36  /  AlkPhos  100  02-17    Hemoglobin A1C:     LIVER FUNCTIONS - ( 17 Feb 2023 07:59 )  Alb: 3.4 g/dL / Pro: 7.0 g/dL / ALK PHOS: 100 U/L / ALT: 36 U/L DA / AST: 30 U/L / GGT: x           Vitamin B12         RADIOLOGY    ANALYSIS AND PLAN:  This is an 82-year-old with episode of altered mental status and lethargy.  For episode of altered mental status and lethargy, questionable this could be medication related.  .  Questionable this could be any type of underlying fluctuations in blood pressure or heart rate.  No prior history of underlying epilepsy and no seizure-like activity was reported.  There were no new clear signs on examination to suggest that a new cerebrovascular accident had ensued.  Telemetry evaluation as needed.  Monitor systolic blood pressure as needed.  For history of dementia, appears to be advanced, I would recommend supportive therapy.  For underlying confusional state with anxiety, I would recommend Psychiatry followup, adjustment of medications as needed.  For hyperlipidemia, continue the patient on statin.  overall more awake and interactive     seen with son today 2/17     Spoke with daughter-in-law at bedside, primary contact will be son, Christopher, [phone]; second will be daughter-in-law, Cathryn, [phone] 2/16 ; third will be other son, Bar, [phone]; fourth will be his wife, Celeste, [phone].    Greater than 45 minutes of time was spent with the patient, plan of care, reviewing data, with greater than 50% of the visit was spent counseling and/or coordinating care with multidisciplinary healthcare team    
Patient is a 82y old  Female who presents with a chief complaint of Bradycardia (19 Feb 2023 10:37)      INTERVAL HPI/OVERNIGHT EVENTS:    Patient seen and examined in am, arousable, confused, not following commands. appears comfortable  ROS limited by dementia          Vital Signs Last 24 Hrs  T(C): 37.3 (19 Feb 2023 13:05), Max: 37.3 (19 Feb 2023 13:05)  T(F): 99.2 (19 Feb 2023 13:05), Max: 99.2 (19 Feb 2023 13:05)  HR: 52 (19 Feb 2023 13:05) (52 - 86)  BP: 101/57 (19 Feb 2023 13:05) (101/57 - 166/94)  BP(mean): --  RR: 16 (19 Feb 2023 13:05) (16 - 18)  SpO2: 95% (19 Feb 2023 13:05) (93% - 97%)    Parameters below as of 19 Feb 2023 13:05  Patient On (Oxygen Delivery Method): room air        PHYSICAL EXAM:  GENERAL: NAD, elderly female  HEAD:  Atraumatic, Normocephalic  EYES: EOMI, PERRLA  ENMT: Moist mucous membranes, Good dentition, No lesions   NECK: Supple, No JVD, Normal thyroid  NERVOUS SYSTEM:  arousable, oriented x1, confused.   CHEST/LUNG: Clear to auscultation bilaterally; No rales, rhonchi, wheezing, or rubs  HEART: Regular rate and rhythm; No murmurs, rubs, or gallops  ABDOMEN: Soft, Nontender, Nondistended; Bowel sounds present  EXTREMITIES:  + Pulses, No clubbing, cyanosis, or edema  SKIN: No rashes or lesions    MEDICATIONS  (STANDING):  aspirin enteric coated 81 milliGRAM(s) Oral daily  atorvastatin 40 milliGRAM(s) Oral at bedtime  enoxaparin Injectable 40 milliGRAM(s) SubCutaneous every 24 hours  famotidine    Tablet 20 milliGRAM(s) Oral daily  losartan 50 milliGRAM(s) Oral two times a day  melatonin 5 milliGRAM(s) Oral at bedtime  metoprolol tartrate 25 milliGRAM(s) Oral two times a day  polyethylene glycol 3350 17 Gram(s) Oral daily  risperiDONE   Tablet 0.5 milliGRAM(s) Oral two times a day  traZODone 100 milliGRAM(s) Oral at bedtime    MEDICATIONS  (PRN):  acetaminophen     Tablet .. 650 milliGRAM(s) Oral every 6 hours PRN Temp greater or equal to 38C (100.4F), Mild Pain (1 - 3)  ALPRAZolam 0.25 milliGRAM(s) Oral at bedtime PRN anxiety/agitation      Allergies    No Known Allergies    Intolerances          LABS:              CAPILLARY BLOOD GLUCOSE          RADIOLOGY & ADDITIONAL TESTS:        Care Discussed with Consultants/Other Providers:    Advanced Directives: [ ] DNR  [ ] No feeding tube  [ ] MOLST in chart  [ ] MOLST completed today  [ ] Unknown  
Subjective: Patient seen and examined. No overnight events and slept well.     MEDICATIONS  (STANDING):  ARIPiprazole 10 milliGRAM(s) Oral daily  aspirin enteric coated 81 milliGRAM(s) Oral daily  atorvastatin 40 milliGRAM(s) Oral at bedtime  enoxaparin Injectable 40 milliGRAM(s) SubCutaneous every 24 hours  famotidine    Tablet 20 milliGRAM(s) Oral daily  losartan 50 milliGRAM(s) Oral daily  melatonin 5 milliGRAM(s) Oral at bedtime  metoprolol tartrate 25 milliGRAM(s) Oral two times a day  polyethylene glycol 3350 17 Gram(s) Oral daily  QUEtiapine 50 milliGRAM(s) Oral at bedtime  risperiDONE   Tablet 0.25 milliGRAM(s) Oral two times a day    MEDICATIONS  (PRN):  acetaminophen     Tablet .. 650 milliGRAM(s) Oral every 6 hours PRN Temp greater or equal to 38C (100.4F), Mild Pain (1 - 3)      Allergies    No Known Allergies    Intolerances        Vital Signs Last 24 Hrs  T(C): 36.3 (2023 06:34), Max: 37 (2023 18:02)  T(F): 97.4 (2023 06:34), Max: 98.6 (2023 18:02)  HR: 86 (2023 06:34) (50 - 86)  BP: 186/86 (2023 06:34) (122/66 - 186/86)  BP(mean): --  RR: 18 (2023 06:34) (14 - 18)  SpO2: 98% (2023 06:34) (96% - 99%)    Parameters below as of 2023 21:06  Patient On (Oxygen Delivery Method): room air        PHYSICAL EXAM:  GENERAL: NAD, well-groomed, well-developed  HEAD:  Atraumatic, Normocephalic  ENMT: Moist mucous membranes,   NECK: Supple, No JVD, Normal thyroid  NERVOUS SYSTEM:  All 4 extremities mobile, no gross sensory deficits.   CHEST/LUNG: Clear to auscultation bilaterally; No rales, rhonchi, wheezing, or rubs  HEART: Regular rate and rhythm; No murmurs, rubs, or gallops  ABDOMEN: Soft, Nontender, Nondistended; Bowel sounds present  EXTREMITIES:  2+ Peripheral Pulses, No clubbing, cyanosis, or edema      LABS:                        13.0   6.60  )-----------( 218      ( 2023 08:04 )             40.7     2023 08:04    142    |  108    |  13     ----------------------------<  93     3.4     |  24     |  0.57     Ca    8.7        2023 08:04    TPro  6.5    /  Alb  2.9    /  TBili  0.5    /  DBili  x      /  AST  18     /  ALT  19     /  AlkPhos  87     2023 08:04    PT/INR - ( 2023 13:02 )   PT: 13.0 sec;   INR: 1.13 ratio         PTT - ( 2023 13:02 )  PTT:37.2 sec  Urinalysis Basic - ( 2023 12:32 )    Color: Yellow / Appearance: Clear / S.015 / pH: x  Gluc: x / Ketone: Negative  / Bili: Negative / Urobili: Negative mg/dL   Blood: x / Protein: 30 mg/dL / Nitrite: Negative   Leuk Esterase: Trace / RBC: Negative /HPF / WBC 0-2   Sq Epi: x / Non Sq Epi: Occasional / Bacteria: Negative      CAPILLARY BLOOD GLUCOSE          RADIOLOGY & ADDITIONAL TESTS:    Imaging Personally Reviewed:  [ ] YES     Consultant(s) Notes Reviewed:      Care Discussed with Consultants/Other Providers:    Advanced Directives: [ ] DNR  [ ] No feeding tube  [ ] MOLST in chart  [ ] MOLST completed today  [ ] Unknown  
Yenny Boyle M.D.    Patient is a 82y old  Female who presents with a chief complaint of Bradycardia (2023 11:25)      SUBJECTIVE / OVERNIGHT EVENTS: no event overnight. Remains confused, thinks shes driving on the [employer].     MEDICATIONS  (STANDING):  aspirin enteric coated 81 milliGRAM(s) Oral daily  atorvastatin 40 milliGRAM(s) Oral at bedtime  enoxaparin Injectable 40 milliGRAM(s) SubCutaneous every 24 hours  famotidine    Tablet 20 milliGRAM(s) Oral daily  losartan 100 milliGRAM(s) Oral daily  melatonin 5 milliGRAM(s) Oral at bedtime  metoprolol tartrate 25 milliGRAM(s) Oral two times a day  polyethylene glycol 3350 17 Gram(s) Oral daily  risperiDONE   Tablet 0.5 milliGRAM(s) Oral two times a day  traZODone 50 milliGRAM(s) Oral at bedtime    MEDICATIONS  (PRN):  acetaminophen     Tablet .. 650 milliGRAM(s) Oral every 6 hours PRN Temp greater or equal to 38C (100.4F), Mild Pain (1 - 3)      I&O's Summary      PHYSICAL EXAM:  Vital Signs Last 24 Hrs  T(C): 36.3 (15 Feb 2023 05:05), Max: 36.9 (2023 14:23)  T(F): 97.3 (15 Feb 2023 05:05), Max: 98.5 (2023 17:13)  HR: 81 (15 Feb 2023 07:00) (61 - 98)  BP: 170/75 (15 Feb 2023 05:05) (146/80 - 188/93)  BP(mean): --  RR: 18 (15 Feb 2023 05:05) (15 - 18)  SpO2: 97% (15 Feb 2023 05:05) (93% - 97%)    Parameters below as of 2023 17:13  Patient On (Oxygen Delivery Method): room air      CONSTITUTIONAL: NAD, pleasantly confused  ENMT: Moist oral mucosa, no pharyngeal injection or exudates  CARDIOVASCULAR: Regular rate and rhythm, normal S1 and S2,p; No lower extremity edema  ABDOMEN: Nontender to palpation, normoactive bowel sounds  PSYCH: A+O x1; confused  NEUROLOGY: CN 2-12 are intact and symmetric; no gross sensory deficits;   SKIN: No rashes; no palpable lesions    LABS:                        14.8   7.49  )-----------( 266      ( 15 Feb 2023 08:12 )             44.6     02-15    139  |  102  |  8   ----------------------------<  108<H>  3.7   |  24  |  0.56    Ca    9.3      15 Feb 2023 08:12    TPro  7.4  /  Alb  3.6  /  TBili  0.5  /  DBili  x   /  AST  26  /  ALT  26  /  AlkPhos  107  02-15    PT/INR - ( 2023 13:02 )   PT: 13.0 sec;   INR: 1.13 ratio         PTT - ( 2023 13:02 )  PTT:37.2 sec      Urinalysis Basic - ( 2023 12:32 )    Color: Yellow / Appearance: Clear / S.015 / pH: x  Gluc: x / Ketone: Negative  / Bili: Negative / Urobili: Negative mg/dL   Blood: x / Protein: 30 mg/dL / Nitrite: Negative   Leuk Esterase: Trace / RBC: Negative /HPF / WBC 0-2   Sq Epi: x / Non Sq Epi: Occasional / Bacteria: Negative        Culture - Urine (collected 2023 12:32)  Source: Catheterized Catheterized  Final Report (15 Feb 2023 07:37):    No growth      CAPILLARY BLOOD GLUCOSE          RADIOLOGY & ADDITIONAL TESTS:  Results Reviewed:   Imaging Personally Reviewed:  Electrocardiogram Personally Reviewed:

## 2023-02-20 NOTE — SOCIAL WORK PROGRESS NOTE - NSSWPROGRESSNOTE_GEN_ALL_CORE
[facility]  Pts son and daughter in law are aware and in agreement.  Pt informed as well.

## 2023-02-20 NOTE — PROGRESS NOTE ADULT - PROVIDER SPECIALTY LIST ADULT
Cardiology
Hospitalist
Cardiology
Neurology
Cardiology
Hospitalist
Neurology
Cardiology
Hospitalist

## 2023-02-20 NOTE — PROGRESS NOTE ADULT - ASSESSMENT
82F Dementia, HTN, Anxiety and GERD admitted for Dementia with Behavioral Disturbances and Bradycardia    Dementia with Behavioral Disturbances  Slept well overnight. Will increase Seroquel to 75mg at bedtime  Risperidol + Seroquel 50mg at night   Xanax has been made PRN   abilify dc'd    Bradycardia - is sinus  Echo on last admission done with no significant valve pathology and normal EF       HTN  continue losartan 50 BID, continue metoprolol 25 bid.    GERD  Famotidine     History of TIA  Aspirin + Statin     Diet  Regular    DVT Prophylaxis  Lovenox    Disposition   Discharge Rehab, for today, stable for DC, see DCS for more info. time spent on dc 40 minutes.

## 2023-02-20 NOTE — PROGRESS NOTE ADULT - REASON FOR ADMISSION
Bradycardia

## 2023-02-20 NOTE — PROGRESS NOTE ADULT - ASSESSMENT
The patient is an 82 year old female with a history of HTN, DVT, anemia, anxiety, GERD, dementia who presents with unresponsiveness.    2/20/23  Seen at [facility] telemetry  Sitting in chair  Son at bedside  No complaints offered    Plan:  - Unclear if true syncope vs. 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

## 2023-04-03 NOTE — ED ADULT NURSE NOTE - NSFALLRSKOUTCOME_ED_ALL_ED
Nursing staff: Please call and notify patient's parent of urine culture.  Few bacteria isolated, but may be related to normal robson or an infection. If still having symptoms she should f/u with PCP for repeat urinalysis and culture, or if not able to f/u we can prescribe an antibiotic.   Fall Risk

## 2023-07-12 NOTE — ED ADULT NURSE NOTE - NSFALLRSKASSESASSIST_ED_ALL_ED
Pt walked in c/o etoh withdrawal, tremors, blood tinged emesis, and LLQ abdominal pain x today. Pt reports his last drink was last night at 2330. Reports history of eoth withdrawal seizures. Sepsis called in triage.
yes

## 2023-07-13 NOTE — PROVIDER CONTACT NOTE (MEDICATION) - BACKGROUND
Need a refill for Triamcinolone 0.5% cream    [facility]  - Mail Order      # [id number]
Sent to pharmacy
Pt admitted for syncope,. History of HTN. Tends to have elevated BP in the evenings.
pt with elevated BP earlier tonight. Pt received HCTZ at approx 2300 with no changes to BP post medication administration

## 2023-09-16 ENCOUNTER — EMERGENCY (EMERGENCY)
Facility: HOSPITAL | Age: 83
LOS: 1 days | Discharge: HOME | End: 2023-09-16
Attending: INTERNAL MEDICINE | Admitting: INTERNAL MEDICINE
Payer: MEDICARE

## 2023-09-16 VITALS
HEART RATE: 83 BPM | DIASTOLIC BLOOD PRESSURE: 89 MMHG | SYSTOLIC BLOOD PRESSURE: 169 MMHG | OXYGEN SATURATION: 96 % | TEMPERATURE: 99 F | RESPIRATION RATE: 17 BRPM

## 2023-09-16 VITALS
RESPIRATION RATE: 18 BRPM | WEIGHT: 136.03 LBS | TEMPERATURE: 98 F | HEART RATE: 90 BPM | SYSTOLIC BLOOD PRESSURE: 193 MMHG | DIASTOLIC BLOOD PRESSURE: 99 MMHG | OXYGEN SATURATION: 95 %

## 2023-09-16 PROCEDURE — 73060 X-RAY EXAM OF HUMERUS: CPT | Mod: 26,LT

## 2023-09-16 PROCEDURE — 73030 X-RAY EXAM OF SHOULDER: CPT

## 2023-09-16 PROCEDURE — 99284 EMERGENCY DEPT VISIT MOD MDM: CPT

## 2023-09-16 PROCEDURE — 73060 X-RAY EXAM OF HUMERUS: CPT

## 2023-09-16 PROCEDURE — 73030 X-RAY EXAM OF SHOULDER: CPT | Mod: 26,LT

## 2023-09-16 RX ORDER — ACETAMINOPHEN 500 MG
650 TABLET ORAL ONCE
Refills: 0 | Status: COMPLETED | OUTPATIENT
Start: 2023-09-16 | End: 2023-09-16

## 2023-09-16 RX ORDER — LIDOCAINE 4 G/100G
1 CREAM TOPICAL ONCE
Refills: 0 | Status: COMPLETED | OUTPATIENT
Start: 2023-09-16 | End: 2023-09-16

## 2023-09-16 RX ADMIN — LIDOCAINE 1 PATCH: 4 CREAM TOPICAL at 18:56

## 2023-09-16 RX ADMIN — Medication 650 MILLIGRAM(S): at 19:56

## 2023-09-16 RX ADMIN — Medication 650 MILLIGRAM(S): at 18:56

## 2023-09-16 NOTE — ED ADULT NURSE NOTE - CHIEF COMPLAINT QUOTE
[facility] left shoulder pain. Denies LOC or blood thinners. [facility] pain. Denies LOC or blood thinners. [facility] left shoulder pain. Denies LOC or blood thinners.

## 2023-09-16 NOTE — ED PROVIDER NOTE - OBJECTIVE STATEMENT
83-year-old female status post fall assisted living left shoulder pain no loss of consciousness no head injury patient is not on anticoagulation

## 2023-09-16 NOTE — ED ADULT TRIAGE NOTE - CHIEF COMPLAINT QUOTE
[facility] left shoulder pain. Denies LOC or blood thinners. [facility] left [facility] pain. Denies LOC or blood thinners. [facility] left shoulder pain. Denies LOC or blood thinners.

## 2023-09-16 NOTE — ED PROVIDER NOTE - CARE PROVIDERS DIRECT ADDRESSES
[email or web address]

## 2023-09-16 NOTE — ED PROVIDER NOTE - NSFOLLOWUPINSTRUCTIONS_ED_ALL_ED_FT
Follow up with your PMD within 1-2 days.  Rest, increase your fluids, advance your activity as tolerated.   Take all of your other medications as previously prescribed.   Worsening, continued or ANY new concerning symptoms return to the emergency department.  Lidocaine patch 4% once a day Tylenol 650 mg every 6 hours as needed pain patient has a diagnosis of left proximal humerus fracture

## 2023-09-16 NOTE — ED PROVIDER NOTE - PROVIDER TOKENS
PROVIDER:[TOKEN:[[id number]]] PROVIDER:[TOKEN:[[id number]]] PROVIDER:[TOKEN:[[id number]]]

## 2023-09-16 NOTE — ED ADULT NURSE NOTE - NSFALLHARMRISKINTERV_ED_ALL_ED
Assistance OOB with selected safe patient handling equipment if applicable/Assistance with ambulation/Communicate risk of Fall with Harm to all staff, patient, and family/Monitor gait and stability/Provide patient with walking aids/Provide visual cue: red socks, yellow wristband, yellow gown, etc/Reinforce activity limits and safety measures with patient and family/Bed in lowest position, wheels locked, appropriate side rails in place/Call bell, personal items and telephone in reach/Instruct patient to call for assistance before getting out of bed/chair/stretcher/Non-slip footwear applied when patient is off stretcher/[place] to call system/Physically safe environment - no spills, clutter or unnecessary equipment/Purposeful Proactive Rounding/Room/bathroom lighting operational, light cord in reach Assistance OOB with selected safe patient handling equipment if applicable/Assistance with ambulation/Communicate risk of Fall with Harm to all staff, patient, and family/Monitor gait and stability/Provide patient with walking aids/Provide visual cue: red socks, yellow wristband, yellow gown, etc/Reinforce activity limits and safety measures with patient and family/Bed in lowest position, wheels locked, appropriate side rails in place/Call bell, personal items and telephone in reach/Instruct patient to call for assistance before getting out of bed/chair/stretcher/Non-slip footwear applied when patient is off stretcher/[place] to call system/Physically safe environment - no spills, clutter or unnecessary equipment/Purposeful Proactive Rounding/Room/bathroom lighting operational, light cord in reach Assistance OOB with selected safe patient handling equipment if applicable/Assistance with ambulation/Communicate risk of Fall with Harm to all staff, patient, and family/Monitor gait and stability/Provide patient with walking aids/Provide visual cue: red socks, yellow wristband, yellow gown, etc/Reinforce activity limits and safety measures with patient and family/Bed in lowest position, wheels locked, appropriate side rails in place/Call bell, personal items and telephone in reach/Instruct patient to call for assistance before getting out of bed/chair/stretcher/Non-slip footwear applied when patient is off stretcher/[place] to call system/Physically safe environment - no spills, clutter or unnecessary equipment/Purposeful Proactive Rounding/Room/bathroom lighting operational, light cord in reach

## 2023-09-16 NOTE — ED PROVIDER NOTE - PHYSICAL EXAMINATION
General:     NAD, elderly frail  Head:     NC/AT, EOMI, oral mucosa moist  Neck:     trachea midline  Lungs:     CTA b/l, no w/r/r  CVS:     S1S2, RRR, no m/g/r  Abd:     +BS, s/nt/nd, no organomegaly  Ext:    2+ radial and pedal pulses, no c/c/e  L shoulder + tenderness decreased ROM neuro vs L UL intact  Neuro: AAOx3, no sensory/motor deficits

## 2023-09-16 NOTE — ED PROVIDER NOTE - PATIENT PORTAL LINK FT
You can access the FollowMyHealth Patient Portal offered by [facility] by registering at the following website: [email or web address]. By joining [employer]’s FollowMyHealth portal, you will also be able to view your health information using other applications (apps) compatible with our system. You can access the FollowMyHealth Patient Portal offered by [facility] by registering at the following website: [email or web address]. By joining [employer]’s FollowMyHealth portal, you will also be able to view your health information using other applications (apps) compatible with our system. You can access the FollowMyHealth Patient Portal offered by [facility] by registering at the following website: [email or web address]. By joining [employer]’s FollowMyHealth portal, you will also be able to view your health information using other applications (apps) compatible with our system.

## 2023-09-16 NOTE — ED PROVIDER NOTE - CLINICAL SUMMARY MEDICAL DECISION MAKING FREE TEXT BOX
Status post fall approximately wrist fracture recommend Tylenol arm sling applied follow-up with orthopedic doctor

## 2023-09-16 NOTE — ED PROVIDER NOTE - CARE PLAN
Principal Discharge DX:	Other closed nondisplaced fracture of proximal end of left humerus, initial encounter  Goal:	Left proximal humerus fracture   1

## 2023-09-16 NOTE — ED PROVIDER NOTE - CARE PROVIDER_API CALL
Jose Alcaraz  Orthopaedic Surgery  [address], [address]  [place] [address]  Phone: [phone]  Fax: [phone]  Follow Up Time:    Jose Alcaraz  Orthopaedic Surgery  [address], [address]  [place] [address]  Phone: [phone]  Fax: [phone]  Follow Up Time:    Jose Alcaraz  Orthopaedic Surgery  [address], [address]  [place] [address]  Phone: [phone]  Fax: [phone]  Follow Up Time:

## 2023-09-18 PROBLEM — Z00.00 ENCOUNTER FOR PREVENTIVE HEALTH EXAMINATION: Status: ACTIVE | Noted: 2023-09-18

## 2023-09-21 ENCOUNTER — APPOINTMENT (OUTPATIENT)
Dept: ORTHOPEDIC SURGERY | Facility: CLINIC | Age: 83
End: 2023-09-21
Payer: MEDICARE

## 2023-09-21 ENCOUNTER — NON-APPOINTMENT (OUTPATIENT)
Age: 83
End: 2023-09-21

## 2023-09-21 VITALS — BODY MASS INDEX: 22.61 KG/M2 | HEIGHT: 62.5 IN | WEIGHT: 126 LBS

## 2023-09-21 PROCEDURE — 73030 X-RAY EXAM OF SHOULDER: CPT | Mod: LT

## 2023-09-21 PROCEDURE — 99203 OFFICE O/P NEW LOW 30 MIN: CPT

## 2023-10-24 NOTE — ED ADULT NURSE NOTE - DISCHARGE DATE/TIME
Daily Note     Today's date: 10/24/2023  Patient name: Darylene Cagey  : 1996  MRN: [id number]  Referring provider: Malaika Calvin DO  Dx:   Encounter Diagnosis     ICD-10-CM    1. S/P right knee surgery  Z98.890       2. Right knee pain, unspecified chronicity  M25.561           Start Time: 1615  Stop Time: 1700  Total time in clinic (min): 45 minutes    Subjective: Pt reports R knee has been doing well. Pt notes transitioning into running program, is currently doing 5 minute walking w/u, 2 minute run, 3 minute walk cycles for ten minutes, and cools down with 5 minute walk which has been going well. 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   Pt notes having PVC's more frequently and is currently wearing a heart monitor for 2 weeks and was cleared to participate in PT per cardiologist.      Objective: See treatment diary below  Precautions: hx POTS, HTN, follow protocol         Manuals 8/14  8/21  9/12  9/21  10/6  10/24       Measurements/ RE    15 min/ MC      15 min/ MC                             Neuro Re-Ed                   Lateral jogging (sidestepping) 3 laps                 Sit to stand elevated surface  10x chair (staggered)    15x staggered chair  x15 staggered chair           Lateral step up (SL squat) Step 8x R   1R 12x kevin  1R x15 ea    1R x15 ea       tandem stance                   Light jog trampoline 3x30"    3x30"   30"x3  5 min outside  5 min outside       Monster walks Red 2 laps    red 3 laps  Green 2 laps    Green 2 laps       Sidestepping  red 3 laps    red 3 laps  Green 3 laps    Green 3 laps       Agility ladder     5 min  5 min  5 min  5 min       SLS  2x30: kevin foam   2x30" kevin foam  30"x2 ea foam    30"x2 ea foam       Ther Ex                   Treadmill 2.0 mph 8 min 2 mph 8 min  1.8 mph 8 min  2.0 mph 8 min  2.0 mph 10 min  2.0 mph 10 min       sidelying hip abduction HEP                 Prone hip extension HEP SLR 5" 15x R  5" 5x R  5" 15x  5"x15           SL press (0-45*) seat 3, plate 6 35 lbs 03M R    45 lbs 20x R  45# x20 R  65 lbs 20x R  65# x20 R       Ther Activity                                                           Gait Training                                                           Modalities                   Ice PRN    10 min                                           Assessment: Tolerated treatment well. Bilat gluteal fatigue with side stepping/monster walks. Discussed running progressions and how to safely progress time when ready. Patient fatigues with all exercises performed. Continue to progress as tolerated. Patient demonstrated fatigue post treatment and would benefit from continued PT      Plan: Progress treatment as tolerated. 22-Jan-2023 06:41

## 2023-11-07 ENCOUNTER — APPOINTMENT (OUTPATIENT)
Dept: ORTHOPEDIC SURGERY | Facility: CLINIC | Age: 83
End: 2023-11-07
Payer: MEDICARE

## 2023-11-07 DIAGNOSIS — S42.209A UNSPECIFIED FRACTURE OF UPPER END OF UNSPECIFIED HUMERUS, INITIAL ENCOUNTER FOR CLOSED FRACTURE: ICD-10-CM

## 2023-11-07 PROCEDURE — 99213 OFFICE O/P EST LOW 20 MIN: CPT

## 2023-11-07 PROCEDURE — 73030 X-RAY EXAM OF SHOULDER: CPT | Mod: LT

## 2024-03-07 ENCOUNTER — NON-APPOINTMENT (OUTPATIENT)
Age: 84
End: 2024-03-07

## 2024-03-07 DIAGNOSIS — R31.29 OTHER MICROSCOPIC HEMATURIA: ICD-10-CM

## 2024-03-07 DIAGNOSIS — Z80.0 FAMILY HISTORY OF MALIGNANT NEOPLASM OF DIGESTIVE ORGANS: ICD-10-CM

## 2024-03-07 DIAGNOSIS — Z15.09 GENETIC SUSCEPTIBILITY TO MALIGNANT NEOPLASM OF BREAST: ICD-10-CM

## 2024-03-07 DIAGNOSIS — E78.5 HYPERLIPIDEMIA, UNSPECIFIED: ICD-10-CM

## 2024-03-07 DIAGNOSIS — I34.0 NONRHEUMATIC MITRAL (VALVE) INSUFFICIENCY: ICD-10-CM

## 2024-03-07 DIAGNOSIS — Z78.9 OTHER SPECIFIED HEALTH STATUS: ICD-10-CM

## 2024-03-07 DIAGNOSIS — Z87.81 PERSONAL HISTORY OF (HEALED) TRAUMATIC FRACTURE: ICD-10-CM

## 2024-03-07 DIAGNOSIS — K63.5 POLYP OF COLON: ICD-10-CM

## 2024-03-07 DIAGNOSIS — Z92.89 PERSONAL HISTORY OF OTHER MEDICAL TREATMENT: ICD-10-CM

## 2024-03-07 DIAGNOSIS — I10 ESSENTIAL (PRIMARY) HYPERTENSION: ICD-10-CM

## 2024-03-07 DIAGNOSIS — Z15.01 GENETIC SUSCEPTIBILITY TO MALIGNANT NEOPLASM OF BREAST: ICD-10-CM

## 2024-03-07 DIAGNOSIS — M81.0 AGE-RELATED OSTEOPOROSIS W/OUT CURRENT PATHOLOGICAL FRACTURE: ICD-10-CM

## 2024-03-07 DIAGNOSIS — I65.29 OCCLUSION AND STENOSIS OF UNSPECIFIED CAROTID ARTERY: ICD-10-CM

## 2024-03-07 DIAGNOSIS — Z87.19 PERSONAL HISTORY OF OTHER DISEASES OF THE DIGESTIVE SYSTEM: ICD-10-CM

## 2024-03-07 DIAGNOSIS — Z63.4 DISAPPEARANCE AND DEATH OF FAMILY MEMBER: ICD-10-CM

## 2024-03-07 DIAGNOSIS — E04.9 NONTOXIC GOITER, UNSPECIFIED: ICD-10-CM

## 2024-03-07 DIAGNOSIS — D51.0 VITAMIN B12 DEFICIENCY ANEMIA DUE TO INTRINSIC FACTOR DEFICIENCY: ICD-10-CM

## 2024-03-07 RX ORDER — SIMVASTATIN 10 MG/1
10 TABLET, FILM COATED ORAL DAILY
Refills: 0 | Status: ACTIVE | COMMUNITY

## 2024-03-07 RX ORDER — NITROFURANTOIN MONOHYDRATE/MACROCRYSTALLINE 25; 75 MG/1; MG/1
100 CAPSULE ORAL
Refills: 0 | Status: ACTIVE | COMMUNITY

## 2024-03-07 RX ORDER — LOSARTAN POTASSIUM AND HYDROCHLOROTHIAZIDE 12.5; 5 MG/1; MG/1
50-12.5 TABLET ORAL DAILY
Refills: 0 | Status: ACTIVE | COMMUNITY

## 2024-03-07 RX ORDER — FOLIC ACID 1 MG/1
1 TABLET ORAL DAILY
Refills: 0 | Status: ACTIVE | COMMUNITY

## 2024-03-07 RX ORDER — AMLODIPINE BESYLATE 5 MG/1
5 TABLET ORAL DAILY
Refills: 0 | Status: ACTIVE | COMMUNITY

## 2024-03-07 SDOH — SOCIAL STABILITY - SOCIAL INSECURITY: DISSAPEARANCE AND DEATH OF FAMILY MEMBER: Z63.4

## 2024-05-15 NOTE — BH CONSULTATION LIAISON ASSESSMENT NOTE - NSBHROSSTATEMENT_PSY_A_CORE
Presents from triage to room via stretcher, left hip pain noted denies fall or injury, 2 wounds to right lower leg noted front and back, pt was on the way to wound care out patient for evaluation when pain was too severe to go there. Pt diverted to ED and wound care made aware, pt voided in bed by accident with bed pan spilling, assistance of family members needed to change patient with severe obesity
.

## 2024-05-21 NOTE — ED PROVIDER NOTE - NS ED MD TWO NIGHTS YN
Mary Rosario    YOB: 1968     Date of Service:  5/21/2024     Chief Complaint   Patient presents with    COPD    Pulmonary Nodule       HPI recent ER visit on 5/10 for COPD exacerbation treated with prednisone.  States that she currently feels well, has cough with significant mucoid phlegm but no wheezing.  Uses 2 L O2 as needed and at nighttime.  She apparently has not been using any inhalers-Dulera 200 and Spiriva HandiHaler, only uses DuoNeb breathing treatments as needed.    Allergies   Allergen Reactions    Penicillins Shortness Of Breath    Codeine Nausea And Vomiting    Posaconazole Nausea And Vomiting     diarrhea     Outpatient Medications Marked as Taking for the 5/21/24 encounter (Office Visit) with Gregory Ravi MD   Medication Sig Dispense Refill    mometasone-formoterol (DULERA) 200-5 MCG/ACT inhaler Inhale 2 puffs into the lungs in the morning and 2 puffs in the evening. 13 g 5    tiotropium (SPIRIVA HANDIHALER) 18 MCG inhalation capsule Inhale 1 capsule into the lungs daily 30 capsule 3    guaiFENesin (MUCINEX) 600 MG extended release tablet Take 1 tablet by mouth 2 times daily 180 tablet 3    ipratropium 0.5 mg-albuterol 2.5 mg (DUONEB) 0.5-2.5 (3) MG/3ML SOLN nebulizer solution Inhale 3 mLs into the lungs every 4 hours 120 mL 5    albuterol sulfate HFA (PROVENTIL;VENTOLIN;PROAIR) 108 (90 Base) MCG/ACT inhaler Inhale 2 puffs into the lungs every 6 hours as needed for Wheezing 18 g 11    Naproxen Sodium 220 MG CAPS Take 1 tablet by mouth 2 times daily as needed for Pain (Arthritis.)      METHADONE HCL PO Take 60 mg by mouth daily         Immunization History   Administered Date(s) Administered    COVID-19, PFIZER PURPLE top, DILUTE for use, (age 12 y+), 30mcg/0.3mL 04/01/2021, 04/22/2021, 12/23/2021    Hep A, HAVRIX, VAQTA, (age 19y+), IM, 1mL 09/25/2019    Influenza Virus Vaccine 01/19/2024    Pneumococcal, PCV20, PREVNAR 20, (age 6w+), IM, 0.5mL 01/19/2024       Past  Yes

## 2024-06-16 ENCOUNTER — EMERGENCY (EMERGENCY)
Facility: HOSPITAL | Age: 84
LOS: 1 days | Discharge: HOME | End: 2024-06-16
Attending: INTERNAL MEDICINE | Admitting: INTERNAL MEDICINE
Payer: MEDICARE

## 2024-06-16 VITALS
OXYGEN SATURATION: 97 % | WEIGHT: 126.1 LBS | RESPIRATION RATE: 17 BRPM | SYSTOLIC BLOOD PRESSURE: 179 MMHG | HEIGHT: 62 IN | HEART RATE: 97 BPM | DIASTOLIC BLOOD PRESSURE: 92 MMHG | TEMPERATURE: 99 F

## 2024-06-16 VITALS
RESPIRATION RATE: 17 BRPM | DIASTOLIC BLOOD PRESSURE: 73 MMHG | SYSTOLIC BLOOD PRESSURE: 155 MMHG | OXYGEN SATURATION: 96 % | HEART RATE: 70 BPM

## 2024-06-16 PROBLEM — F03.90 UNSPECIFIED DEMENTIA WITHOUT BEHAVIORAL DISTURBANCE: Chronic | Status: ACTIVE | Noted: 2023-09-16

## 2024-06-16 PROBLEM — I10 ESSENTIAL (PRIMARY) HYPERTENSION: Chronic | Status: ACTIVE | Noted: 2023-09-16

## 2024-06-16 PROBLEM — F03.90 UNSPECIFIED DEMENTIA, UNSPECIFIED SEVERITY, WITHOUT BEHAVIORAL DISTURBANCE, PSYCHOTIC DISTURBANCE, MOOD DISTURBANCE, AND ANXIETY: Chronic | Status: ACTIVE | Noted: 2023-09-16

## 2024-06-16 LAB
ALBUMIN SERPL ELPH-MCNC: 3.4 G/DL — SIGNIFICANT CHANGE UP (ref 3.3–5)
ALP SERPL-CCNC: 131 U/L — HIGH (ref 40–120)
ALT FLD-CCNC: 23 U/L — SIGNIFICANT CHANGE UP (ref 10–45)
ANION GAP SERPL CALC-SCNC: 6 MMOL/L — SIGNIFICANT CHANGE UP (ref 5–17)
APPEARANCE UR: ABNORMAL
AST SERPL-CCNC: 19 U/L — SIGNIFICANT CHANGE UP (ref 10–40)
BACTERIA # UR AUTO: ABNORMAL /HPF
BASOPHILS # BLD AUTO: 0.03 K/UL — SIGNIFICANT CHANGE UP (ref 0–0.2)
BASOPHILS NFR BLD AUTO: 0.4 % — SIGNIFICANT CHANGE UP (ref 0–2)
BILIRUB SERPL-MCNC: 0.5 MG/DL — SIGNIFICANT CHANGE UP (ref 0.2–1.2)
BILIRUB UR-MCNC: NEGATIVE — SIGNIFICANT CHANGE UP
BUN SERPL-MCNC: 15 MG/DL — SIGNIFICANT CHANGE UP (ref 7–23)
CALCIUM SERPL-MCNC: 9.3 MG/DL — SIGNIFICANT CHANGE UP (ref 8.4–10.5)
CHLORIDE SERPL-SCNC: 104 MMOL/L — SIGNIFICANT CHANGE UP (ref 96–108)
CO2 SERPL-SCNC: 29 MMOL/L — SIGNIFICANT CHANGE UP (ref 22–31)
COLOR SPEC: YELLOW — SIGNIFICANT CHANGE UP
COMMENT - URINE: SIGNIFICANT CHANGE UP
CREAT SERPL-MCNC: 0.77 MG/DL — SIGNIFICANT CHANGE UP (ref 0.5–1.3)
DIFF PNL FLD: NEGATIVE — SIGNIFICANT CHANGE UP
EGFR: 76 ML/MIN/1.73M2 — SIGNIFICANT CHANGE UP
EOSINOPHIL # BLD AUTO: 0.12 K/UL — SIGNIFICANT CHANGE UP (ref 0–0.5)
EOSINOPHIL NFR BLD AUTO: 1.5 % — SIGNIFICANT CHANGE UP (ref 0–6)
EPI CELLS # UR: 1 — SIGNIFICANT CHANGE UP
GLUCOSE SERPL-MCNC: 106 MG/DL — HIGH (ref 70–99)
GLUCOSE UR QL: NEGATIVE MG/DL — SIGNIFICANT CHANGE UP
HCT VFR BLD CALC: 43.2 % — SIGNIFICANT CHANGE UP (ref 34.5–45)
HGB BLD-MCNC: 14.3 G/DL — SIGNIFICANT CHANGE UP (ref 11.5–15.5)
IMM GRANULOCYTES NFR BLD AUTO: 0.7 % — SIGNIFICANT CHANGE UP (ref 0–0.9)
KETONES UR-MCNC: NEGATIVE MG/DL — SIGNIFICANT CHANGE UP
LEUKOCYTE ESTERASE UR-ACNC: NEGATIVE — SIGNIFICANT CHANGE UP
LYMPHOCYTES # BLD AUTO: 1.46 K/UL — SIGNIFICANT CHANGE UP (ref 1–3.3)
LYMPHOCYTES # BLD AUTO: 17.9 % — SIGNIFICANT CHANGE UP (ref 13–44)
MCHC RBC-ENTMCNC: 30.6 PG — SIGNIFICANT CHANGE UP (ref 27–34)
MCHC RBC-ENTMCNC: 33.1 GM/DL — SIGNIFICANT CHANGE UP (ref 32–36)
MCV RBC AUTO: 92.5 FL — SIGNIFICANT CHANGE UP (ref 80–100)
MONOCYTES # BLD AUTO: 0.67 K/UL — SIGNIFICANT CHANGE UP (ref 0–0.9)
MONOCYTES NFR BLD AUTO: 8.2 % — SIGNIFICANT CHANGE UP (ref 2–14)
NEUTROPHILS # BLD AUTO: 5.81 K/UL — SIGNIFICANT CHANGE UP (ref 1.8–7.4)
NEUTROPHILS NFR BLD AUTO: 71.3 % — SIGNIFICANT CHANGE UP (ref 43–77)
NITRITE UR-MCNC: POSITIVE
NRBC # BLD: 0 /100 WBCS — SIGNIFICANT CHANGE UP (ref 0–0)
PH UR: 8 — SIGNIFICANT CHANGE UP (ref 5–8)
PLATELET # BLD AUTO: 259 K/UL — SIGNIFICANT CHANGE UP (ref 150–400)
POTASSIUM SERPL-MCNC: 4 MMOL/L — SIGNIFICANT CHANGE UP (ref 3.5–5.3)
POTASSIUM SERPL-SCNC: 4 MMOL/L — SIGNIFICANT CHANGE UP (ref 3.5–5.3)
PROT SERPL-MCNC: 7.9 G/DL — SIGNIFICANT CHANGE UP (ref 6–8.3)
PROT UR-MCNC: NEGATIVE MG/DL — SIGNIFICANT CHANGE UP
RBC # BLD: 4.67 M/UL — SIGNIFICANT CHANGE UP (ref 3.8–5.2)
RBC # FLD: 13.1 % — SIGNIFICANT CHANGE UP (ref 10.3–14.5)
RBC CASTS # UR COMP ASSIST: 0 /HPF — SIGNIFICANT CHANGE UP (ref 0–4)
SODIUM SERPL-SCNC: 139 MMOL/L — SIGNIFICANT CHANGE UP (ref 135–145)
SP GR SPEC: 1.01 — SIGNIFICANT CHANGE UP (ref 1–1.03)
UROBILINOGEN FLD QL: 0.2 MG/DL — SIGNIFICANT CHANGE UP (ref 0.2–1)
WBC # BLD: 8.15 K/UL — SIGNIFICANT CHANGE UP (ref 3.8–10.5)
WBC # FLD AUTO: 8.15 K/UL — SIGNIFICANT CHANGE UP (ref 3.8–10.5)
WBC UR QL: 1 /HPF — SIGNIFICANT CHANGE UP (ref 0–5)

## 2024-06-16 PROCEDURE — 96374 THER/PROPH/DIAG INJ IV PUSH: CPT

## 2024-06-16 PROCEDURE — 36415 COLL VENOUS BLD VENIPUNCTURE: CPT

## 2024-06-16 PROCEDURE — 96375 TX/PRO/DX INJ NEW DRUG ADDON: CPT

## 2024-06-16 PROCEDURE — 81001 URINALYSIS AUTO W/SCOPE: CPT

## 2024-06-16 PROCEDURE — 99284 EMERGENCY DEPT VISIT MOD MDM: CPT

## 2024-06-16 PROCEDURE — 72131 CT LUMBAR SPINE W/O DYE: CPT | Mod: MC

## 2024-06-16 PROCEDURE — 96361 HYDRATE IV INFUSION ADD-ON: CPT

## 2024-06-16 PROCEDURE — 80053 COMPREHEN METABOLIC PANEL: CPT

## 2024-06-16 PROCEDURE — 85025 COMPLETE CBC W/AUTO DIFF WBC: CPT

## 2024-06-16 PROCEDURE — 72131 CT LUMBAR SPINE W/O DYE: CPT | Mod: 26,MC

## 2024-06-16 PROCEDURE — 87086 URINE CULTURE/COLONY COUNT: CPT

## 2024-06-16 PROCEDURE — 99284 EMERGENCY DEPT VISIT MOD MDM: CPT | Mod: 25

## 2024-06-16 RX ORDER — ACETAMINOPHEN 500 MG
1000 TABLET ORAL ONCE
Refills: 0 | Status: COMPLETED | OUTPATIENT
Start: 2024-06-16 | End: 2024-06-16

## 2024-06-16 RX ORDER — ONDANSETRON 8 MG/1
4 TABLET, FILM COATED ORAL ONCE
Refills: 0 | Status: COMPLETED | OUTPATIENT
Start: 2024-06-16 | End: 2024-06-16

## 2024-06-16 RX ORDER — SODIUM CHLORIDE 9 MG/ML
1000 INJECTION INTRAMUSCULAR; INTRAVENOUS; SUBCUTANEOUS ONCE
Refills: 0 | Status: COMPLETED | OUTPATIENT
Start: 2024-06-16 | End: 2024-06-16

## 2024-06-16 RX ORDER — CEFEPIME 1 G/1
1000 INJECTION, POWDER, FOR SOLUTION INTRAMUSCULAR; INTRAVENOUS ONCE
Refills: 0 | Status: COMPLETED | OUTPATIENT
Start: 2024-06-16 | End: 2024-06-16

## 2024-06-16 RX ORDER — ACETAMINOPHEN 500 MG
1 TABLET ORAL
Qty: 30 | Refills: 0
Start: 2024-06-16 | End: 2024-06-25

## 2024-06-16 RX ORDER — TRAMADOL HYDROCHLORIDE 50 MG/1
50 TABLET ORAL ONCE
Refills: 0 | Status: DISCONTINUED | OUTPATIENT
Start: 2024-06-16 | End: 2024-06-16

## 2024-06-16 RX ORDER — LIDOCAINE 4 G/100G
1 CREAM TOPICAL
Qty: 2 | Refills: 0
Start: 2024-06-16 | End: 2024-06-25

## 2024-06-16 RX ORDER — CEFPODOXIME PROXETIL 100 MG
1 TABLET ORAL
Qty: 14 | Refills: 0
Start: 2024-06-16 | End: 2024-06-22

## 2024-06-16 RX ORDER — DIAZEPAM 5 MG
5 TABLET ORAL ONCE
Refills: 0 | Status: DISCONTINUED | OUTPATIENT
Start: 2024-06-16 | End: 2024-06-16

## 2024-06-16 RX ORDER — LIDOCAINE 4 G/100G
1 CREAM TOPICAL ONCE
Refills: 0 | Status: COMPLETED | OUTPATIENT
Start: 2024-06-16 | End: 2024-06-16

## 2024-06-16 RX ORDER — TRAMADOL HYDROCHLORIDE 50 MG/1
1 TABLET ORAL
Qty: 12 | Refills: 0
Start: 2024-06-16 | End: 2024-06-18

## 2024-06-16 RX ADMIN — Medication 400 MILLIGRAM(S): at 19:19

## 2024-06-16 RX ADMIN — LIDOCAINE 1 PATCH: 4 CREAM TOPICAL at 19:00

## 2024-06-16 RX ADMIN — Medication 5 MILLIGRAM(S): at 15:02

## 2024-06-16 RX ADMIN — CEFEPIME 100 MILLIGRAM(S): 1 INJECTION, POWDER, FOR SOLUTION INTRAMUSCULAR; INTRAVENOUS at 18:01

## 2024-06-16 RX ADMIN — TRAMADOL HYDROCHLORIDE 50 MILLIGRAM(S): 50 TABLET ORAL at 16:02

## 2024-06-16 RX ADMIN — TRAMADOL HYDROCHLORIDE 50 MILLIGRAM(S): 50 TABLET ORAL at 15:03

## 2024-06-16 RX ADMIN — SODIUM CHLORIDE 1000 MILLILITER(S): 9 INJECTION INTRAMUSCULAR; INTRAVENOUS; SUBCUTANEOUS at 15:02

## 2024-06-16 RX ADMIN — Medication 1000 MILLIGRAM(S): at 19:49

## 2024-06-16 RX ADMIN — SODIUM CHLORIDE 1000 MILLILITER(S): 9 INJECTION INTRAMUSCULAR; INTRAVENOUS; SUBCUTANEOUS at 16:00

## 2024-06-16 RX ADMIN — ONDANSETRON 4 MILLIGRAM(S): 8 TABLET, FILM COATED ORAL at 18:00

## 2024-06-16 RX ADMIN — LIDOCAINE 1 PATCH: 4 CREAM TOPICAL at 15:30

## 2024-06-16 NOTE — ED PROVIDER NOTE - PHYSICAL EXAMINATION
General:     NAD, well-nourished, well-appearing  Head:     NC/AT, EOMI, oral mucosa dry  Neck:     trachea midline  Lungs:     CTA b/l, no w/r/r  CVS:     S1S2, RRR, no m/g/r  Abd:     +BS, s/nt/nd, no organomegaly  Ext:    2+ radial and pedal pulses, no c/c/e  Neuro: AAOx3, no sensory/motor deficits  Skin–positive paraspinal lumbar tenderness bilaterally positive straight leg raising test on the left

## 2024-06-16 NOTE — ED PROVIDER NOTE - PATIENT PORTAL LINK FT
You can access the FollowMyHealth Patient Portal offered by [facility] by registering at the following website: [email or web address]. By joining [employer]’s FollowMyHealth portal, you will also be able to view your health information using other applications (apps) compatible with our system.

## 2024-06-16 NOTE — ED PROVIDER NOTE - CLINICAL SUMMARY MEDICAL DECISION MAKING FREE TEXT BOX
83-year-old female came to the emergency room chief complaint of lower back pain severe denies any fall or injury no fever no abdominal pain no chest pain no nausea vomiting or diarrhea  CBC CMP within normal limits UA positive for UTI CT of the lumbar spine shows multiple compression deformities and positive DJD and lumbar stenosis patient treated with Valium and tramadol and lidocaine patch with significant relief patient also given IV Tylenol and Zofran

## 2024-06-16 NOTE — ED ADULT NURSE NOTE - OBJECTIVE STATEMENT
BIBA from [facility] for severe low back pain x 1 week worse with movement. Hx of Dementia, HTN. AOx2 at baseline. aide at bedside

## 2024-06-16 NOTE — ED PROVIDER NOTE - OBJECTIVE STATEMENT
83-year-old female came to the emergency room chief complaint of lower back pain severe denies any fall or injury no fever no abdominal pain no chest pain no nausea vomiting or diarrhea

## 2024-06-16 NOTE — ED ADULT NURSE NOTE - CHIEF COMPLAINT QUOTE
BIBA from [facility] for severe low back pain x 1 week worse with movement. Hx of Dementia, HTN. AOx2 at baseline. aide at bedside

## 2024-06-16 NOTE — ED ADULT NURSE NOTE - NSICDXPASTMEDICALHX_GEN_ALL_CORE_FT
PAST MEDICAL HISTORY:  Dementia     DVT (deep venous thrombosis)     GERD (gastroesophageal reflux disease)     H/O major depression     H/O syncope     HTN (hypertension)     HTN (hypertension)     Insomnia

## 2024-06-16 NOTE — ED ADULT TRIAGE NOTE - CHIEF COMPLAINT QUOTE
BIBA from [facility] for severe low back pain x 1 week worse with movement. Hx of Dementia, HTN. AOx2 at baseline. aide at bedside

## 2024-06-16 NOTE — ED PROVIDER NOTE - NSFOLLOWUPINSTRUCTIONS_ED_ALL_ED_FT
Follow up with your PMD within 1-2 days.  Rest, increase your fluids, advance your activity as tolerated.   Take all of your other medications as previously prescribed.   Worsening, continued or ANY new concerning symptoms return to the emergency department.  DiseaseTramadol as needed every 6 hours and place Tylenol 3 times a day lidocaine patch once a day patient also prescribed the antibiotic cefpodoxime 200 mg twice a day for UTI

## 2024-06-16 NOTE — ED ADULT NURSE NOTE - NSFALLUNIVINTERV_ED_ALL_ED
Bed/Stretcher in lowest position, wheels locked, appropriate side rails in place/Call bell, personal items and telephone in reach/Instruct patient to call for assistance before getting out of bed/chair/stretcher/Non-slip footwear applied when patient is off stretcher/[place] to call system/Physically safe environment - no spills, clutter or unnecessary equipment/Purposeful proactive rounding/Room/bathroom lighting operational, light cord in reach

## 2024-06-18 LAB
CULTURE RESULTS: SIGNIFICANT CHANGE UP
SPECIMEN SOURCE: SIGNIFICANT CHANGE UP

## 2024-07-19 NOTE — PATIENT PROFILE ADULT - HAVE YOU EXPERIENCED VIOLENCE OR A TRAUMATIC EVENT?
Patient: Zachary Rao    Procedure: Procedure(s):  3T MRI of Brain, Thoracic and C-Spine @ 0800  Exam Under Anesthesia Ear(s), Cleaining, Pressure Equalizing Tube Insertion to right ear only.  Auditory Brainstem Response  Electromyogram       Anesthesia Type:  General    Note:  Disposition: Outpatient   Postop Pain Control: Uneventful            Sign Out: Well controlled pain   PONV: No   Neuro/Psych: Uneventful            Sign Out: Acceptable/Baseline neuro status   Airway/Respiratory: Uneventful            Sign Out: Acceptable/Baseline resp. status   CV/Hemodynamics: Uneventful            Sign Out: Acceptable CV status; No obvious hypovolemia; No obvious fluid overload   Other NRE: NONE   DID A NON-ROUTINE EVENT OCCUR? No    Event details/Postop Comments:  Did well post procedure, mom at bedside. All questions answered.  FUTURE anesthetics: patient walked back with NO preop medication. Did not want to sit on bed. Distracted with ipad, inhalation induction with N2O/sevo           Last vitals:  Vitals Value Taken Time   /62 07/19/24 1215   Temp 36.8  C (98.2  F) 07/19/24 1200   Pulse 136 07/19/24 1215   Resp 36 07/19/24 1215   SpO2 95 % 07/19/24 1215       Electronically Signed By: Susana Muñiz MD  July 19, 2024  1:38 PM   no

## 2024-07-22 NOTE — PATIENT PROFILE ADULT - NSPROMEDSADMININFO_GEN_A_NUR
Addended by: JULIANNA GARSIA on: 7/22/2024 12:57 PM     Modules accepted: Orders    
crush pills for administration

## 2024-08-26 NOTE — PHYSICAL THERAPY INITIAL EVALUATION ADULT - ADDITIONAL COMMENTS
no Lives in [facility].  As per son, pt was independent using walker and was able to dress/bathe independently.

## 2024-09-24 ENCOUNTER — INPATIENT (INPATIENT)
Facility: HOSPITAL | Age: 84
LOS: 1 days | Discharge: NURSING, REHAB OR LONG-TERM CARE | DRG: 72 | End: 2024-09-26
Attending: STUDENT IN AN ORGANIZED HEALTH CARE EDUCATION/TRAINING PROGRAM | Admitting: HOSPITALIST
Payer: MEDICARE

## 2024-09-24 VITALS
SYSTOLIC BLOOD PRESSURE: 109 MMHG | DIASTOLIC BLOOD PRESSURE: 56 MMHG | HEART RATE: 59 BPM | HEIGHT: 61 IN | WEIGHT: 169.98 LBS | OXYGEN SATURATION: 95 % | TEMPERATURE: 99 F | RESPIRATION RATE: 18 BRPM

## 2024-09-24 DIAGNOSIS — Z98.890 OTHER SPECIFIED POSTPROCEDURAL STATES: Chronic | ICD-10-CM

## 2024-09-24 DIAGNOSIS — G93.49 OTHER ENCEPHALOPATHY: ICD-10-CM

## 2024-09-24 PROBLEM — Z87.898 PERSONAL HISTORY OF OTHER SPECIFIED CONDITIONS: Chronic | Status: ACTIVE | Noted: 2024-06-16

## 2024-09-24 PROBLEM — Z86.59 PERSONAL HISTORY OF OTHER MENTAL AND BEHAVIORAL DISORDERS: Chronic | Status: ACTIVE | Noted: 2024-06-16

## 2024-09-24 LAB
ALBUMIN SERPL ELPH-MCNC: 3.5 G/DL — SIGNIFICANT CHANGE UP (ref 3.3–5)
ALP SERPL-CCNC: 99 U/L — SIGNIFICANT CHANGE UP (ref 40–120)
ALT FLD-CCNC: 37 U/L — SIGNIFICANT CHANGE UP (ref 10–45)
ANION GAP SERPL CALC-SCNC: 9 MMOL/L — SIGNIFICANT CHANGE UP (ref 5–17)
APPEARANCE UR: CLEAR — SIGNIFICANT CHANGE UP
AST SERPL-CCNC: 45 U/L — HIGH (ref 10–40)
BASOPHILS # BLD AUTO: 0.04 K/UL — SIGNIFICANT CHANGE UP (ref 0–0.2)
BASOPHILS NFR BLD AUTO: 0.4 % — SIGNIFICANT CHANGE UP (ref 0–2)
BILIRUB SERPL-MCNC: 0.7 MG/DL — SIGNIFICANT CHANGE UP (ref 0.2–1.2)
BILIRUB UR-MCNC: NEGATIVE — SIGNIFICANT CHANGE UP
BUN SERPL-MCNC: 12 MG/DL — SIGNIFICANT CHANGE UP (ref 7–23)
CALCIUM SERPL-MCNC: 9.6 MG/DL — SIGNIFICANT CHANGE UP (ref 8.4–10.5)
CHLORIDE SERPL-SCNC: 102 MMOL/L — SIGNIFICANT CHANGE UP (ref 96–108)
CO2 SERPL-SCNC: 29 MMOL/L — SIGNIFICANT CHANGE UP (ref 22–31)
COLOR SPEC: YELLOW — SIGNIFICANT CHANGE UP
CREAT SERPL-MCNC: 0.95 MG/DL — SIGNIFICANT CHANGE UP (ref 0.5–1.3)
DIFF PNL FLD: NEGATIVE — SIGNIFICANT CHANGE UP
EGFR: 58 ML/MIN/1.73M2 — LOW
EOSINOPHIL # BLD AUTO: 0.23 K/UL — SIGNIFICANT CHANGE UP (ref 0–0.5)
EOSINOPHIL NFR BLD AUTO: 2.1 % — SIGNIFICANT CHANGE UP (ref 0–6)
GLUCOSE SERPL-MCNC: 124 MG/DL — HIGH (ref 70–99)
GLUCOSE UR QL: NEGATIVE MG/DL — SIGNIFICANT CHANGE UP
HCT VFR BLD CALC: 42.5 % — SIGNIFICANT CHANGE UP (ref 34.5–45)
HGB BLD-MCNC: 14.6 G/DL — SIGNIFICANT CHANGE UP (ref 11.5–15.5)
IMM GRANULOCYTES NFR BLD AUTO: 1.4 % — HIGH (ref 0–0.9)
KETONES UR-MCNC: NEGATIVE MG/DL — SIGNIFICANT CHANGE UP
LEUKOCYTE ESTERASE UR-ACNC: NEGATIVE — SIGNIFICANT CHANGE UP
LYMPHOCYTES # BLD AUTO: 1.12 K/UL — SIGNIFICANT CHANGE UP (ref 1–3.3)
LYMPHOCYTES # BLD AUTO: 10.1 % — LOW (ref 13–44)
MCHC RBC-ENTMCNC: 31.6 PG — SIGNIFICANT CHANGE UP (ref 27–34)
MCHC RBC-ENTMCNC: 34.4 GM/DL — SIGNIFICANT CHANGE UP (ref 32–36)
MCV RBC AUTO: 92 FL — SIGNIFICANT CHANGE UP (ref 80–100)
MONOCYTES # BLD AUTO: 0.73 K/UL — SIGNIFICANT CHANGE UP (ref 0–0.9)
MONOCYTES NFR BLD AUTO: 6.6 % — SIGNIFICANT CHANGE UP (ref 2–14)
NEUTROPHILS # BLD AUTO: 8.81 K/UL — HIGH (ref 1.8–7.4)
NEUTROPHILS NFR BLD AUTO: 79.4 % — HIGH (ref 43–77)
NITRITE UR-MCNC: NEGATIVE — SIGNIFICANT CHANGE UP
NRBC # BLD: 0 /100 WBCS — SIGNIFICANT CHANGE UP (ref 0–0)
PH UR: 5.5 — SIGNIFICANT CHANGE UP (ref 5–8)
PLATELET # BLD AUTO: 239 K/UL — SIGNIFICANT CHANGE UP (ref 150–400)
POTASSIUM SERPL-MCNC: 4 MMOL/L — SIGNIFICANT CHANGE UP (ref 3.5–5.3)
POTASSIUM SERPL-SCNC: 4 MMOL/L — SIGNIFICANT CHANGE UP (ref 3.5–5.3)
PROT SERPL-MCNC: 7.9 G/DL — SIGNIFICANT CHANGE UP (ref 6–8.3)
PROT UR-MCNC: NEGATIVE MG/DL — SIGNIFICANT CHANGE UP
RBC # BLD: 4.62 M/UL — SIGNIFICANT CHANGE UP (ref 3.8–5.2)
RBC # FLD: 13.4 % — SIGNIFICANT CHANGE UP (ref 10.3–14.5)
SODIUM SERPL-SCNC: 140 MMOL/L — SIGNIFICANT CHANGE UP (ref 135–145)
SP GR SPEC: 1.02 — SIGNIFICANT CHANGE UP (ref 1–1.03)
TROPONIN I, HIGH SENSITIVITY RESULT: 69.3 NG/L — HIGH
TROPONIN I, HIGH SENSITIVITY RESULT: 69.6 NG/L — HIGH
UROBILINOGEN FLD QL: 0.2 MG/DL — SIGNIFICANT CHANGE UP (ref 0.2–1)
WBC # BLD: 11.09 K/UL — HIGH (ref 3.8–10.5)
WBC # FLD AUTO: 11.09 K/UL — HIGH (ref 3.8–10.5)

## 2024-09-24 PROCEDURE — 93010 ELECTROCARDIOGRAM REPORT: CPT

## 2024-09-24 PROCEDURE — 99222 1ST HOSP IP/OBS MODERATE 55: CPT

## 2024-09-24 PROCEDURE — 71045 X-RAY EXAM CHEST 1 VIEW: CPT | Mod: 26

## 2024-09-24 PROCEDURE — 70450 CT HEAD/BRAIN W/O DYE: CPT | Mod: 26,MC

## 2024-09-24 PROCEDURE — 99291 CRITICAL CARE FIRST HOUR: CPT

## 2024-09-24 PROCEDURE — 99223 1ST HOSP IP/OBS HIGH 75: CPT

## 2024-09-24 RX ORDER — LIDOCAINE 50 MG/G
1 CREAM TOPICAL DAILY
Refills: 0 | Status: DISCONTINUED | OUTPATIENT
Start: 2024-09-24 | End: 2024-09-26

## 2024-09-24 RX ORDER — ROSUVASTATIN CALCIUM 20 MG/1
1 TABLET, COATED ORAL
Refills: 0 | DISCHARGE

## 2024-09-24 RX ORDER — ACETAMINOPHEN 325 MG
650 TABLET ORAL
Refills: 0 | DISCHARGE

## 2024-09-24 RX ORDER — IPRATROPIUM BROMIDE AND ALBUTEROL SULFATE .5; 3 MG/3ML; MG/3ML
3 SOLUTION RESPIRATORY (INHALATION)
Refills: 0 | DISCHARGE

## 2024-09-24 RX ORDER — ACETAMINOPHEN 325 MG
650 TABLET ORAL EVERY 4 HOURS
Refills: 0 | Status: DISCONTINUED | OUTPATIENT
Start: 2024-09-24 | End: 2024-09-26

## 2024-09-24 RX ORDER — FAMOTIDINE 40 MG
20 TABLET ORAL DAILY
Refills: 0 | Status: DISCONTINUED | OUTPATIENT
Start: 2024-09-24 | End: 2024-09-26

## 2024-09-24 RX ORDER — SODIUM CHLORIDE 0.9 % (FLUSH) 0.9 %
500 SYRINGE (ML) INJECTION ONCE
Refills: 0 | Status: COMPLETED | OUTPATIENT
Start: 2024-09-24 | End: 2024-09-24

## 2024-09-24 RX ORDER — ROSUVASTATIN CALCIUM 20 MG/1
5 TABLET, COATED ORAL AT BEDTIME
Refills: 0 | Status: DISCONTINUED | OUTPATIENT
Start: 2024-09-24 | End: 2024-09-26

## 2024-09-24 RX ORDER — ASPIRIN 325 MG
81 TABLET ORAL DAILY
Refills: 0 | Status: DISCONTINUED | OUTPATIENT
Start: 2024-09-24 | End: 2024-09-26

## 2024-09-24 RX ORDER — ENOXAPARIN SODIUM 150 MG/ML
40 INJECTION SUBCUTANEOUS EVERY 24 HOURS
Refills: 0 | Status: DISCONTINUED | OUTPATIENT
Start: 2024-09-24 | End: 2024-09-26

## 2024-09-24 RX ORDER — METOPROLOL TARTRATE 50 MG
25 TABLET ORAL
Refills: 0 | Status: DISCONTINUED | OUTPATIENT
Start: 2024-09-24 | End: 2024-09-26

## 2024-09-24 RX ORDER — DOCUSATE SODIUM 100 MG
1 CAPSULE ORAL
Refills: 0 | DISCHARGE

## 2024-09-24 RX ORDER — 5-HYDROXYTRYPTOPHAN (5-HTP) 100 MG
3 TABLET,DISINTEGRATING ORAL AT BEDTIME
Refills: 0 | Status: DISCONTINUED | OUTPATIENT
Start: 2024-09-24 | End: 2024-09-26

## 2024-09-24 RX ORDER — LOSARTAN POTASSIUM 100 MG/1
50 TABLET, FILM COATED ORAL DAILY
Refills: 0 | Status: DISCONTINUED | OUTPATIENT
Start: 2024-09-24 | End: 2024-09-26

## 2024-09-24 RX ORDER — 5-HYDROXYTRYPTOPHAN (5-HTP) 100 MG
1 TABLET,DISINTEGRATING ORAL
Refills: 0 | DISCHARGE

## 2024-09-24 RX ADMIN — Medication 1000 MILLILITER(S): at 11:05

## 2024-09-24 RX ADMIN — Medication 25 MILLIGRAM(S): at 21:25

## 2024-09-24 RX ADMIN — Medication 0.25 MILLIGRAM(S): at 21:27

## 2024-09-24 RX ADMIN — Medication 3 MILLIGRAM(S): at 21:25

## 2024-09-24 RX ADMIN — Medication 500 MILLILITER(S): at 12:05

## 2024-09-24 RX ADMIN — ROSUVASTATIN CALCIUM 5 MILLIGRAM(S): 20 TABLET, COATED ORAL at 21:25

## 2024-09-24 NOTE — H&P ADULT - MUSCULOSKELETAL
details… generalized weakness in all extremities/no joint swelling/no joint erythema/decreased strength

## 2024-09-24 NOTE — CONSULT NOTE ADULT - SUBJECTIVE AND OBJECTIVE BOX
JESSICA PACHECO  [id number]      HPI:    Jessica Pacheco is an 84 year old woman, resident of nursing home with past medical history of Dementia, Hypertension and GERD with prior hospitalization in February 2023 at [facility] for unreponsivness    ALLERGIES:  No Known Allergies      ROS:  All 10 systems reviewed and positives noted in HPI    OBJECTIVE:    VITAL SIGNS:  Vital Signs Last 24 Hrs  T(C): 37.1 (24 Sep 2024 10:35), Max: 37.1 (24 Sep 2024 10:35)  T(F): 98.7 (24 Sep 2024 10:35), Max: 98.7 (24 Sep 2024 10:35)  HR: 59 (24 Sep 2024 10:35) (59 - 59)  BP: 109/56 (24 Sep 2024 10:35) (109/56 - 109/56)  BP(mean): --  RR: 18 (24 Sep 2024 10:35) (18 - 18)  SpO2: 95% (24 Sep 2024 10:35) (95% - 95%)    Parameters below as of 24 Sep 2024 10:35  Patient On (Oxygen Delivery Method): room air        PHYSICAL EXAM:  General: well appearing, no distress  HEENT: sclera anicteric  Neck: supple, no carotid bruits b/l  CVS: JVP ~ 7 cm H20, RRR, s1, s2, no murmurs/rubs/gallops  Chest: unlabored respirations, clear to auscultation b/l  Abdomen: non-distended  Extremities: no lower extremity edema b/l  Neuro: awake, alert & oriented x 3  Psych: normal affect      LABS:                        14.6   11.09 )-----------( 239      ( 24 Sep 2024 11:02 )             42.5     09-24    140  |  102  |  12  ----------------------------<  124[H]  4.0   |  29  |  0.95    Ca    9.6      24 Sep 2024 11:02    TPro  7.9  /  Alb  3.5  /  TBili  0.7  /  DBili  x   /  AST  45[H]  /  ALT  37  /  AlkPhos  99  09-24            TTE (2/2023):  Normal LV and RV systolic function    ECG (9/24/24): normal sinus rhythm, PAC, LVH     JESSICA PACHECO  [id number]      HPI:    Jessica Pacheco is an 84 year old woman, resident of nursing home with past medical history of Dementia, Hypertension and GERD with prior hospitalization in February 2023 at [facility] for unresponsiveness brought in by EMS for unreponsive episode.    The family and aide are present at bedside and the aide reports that the patient was in a recliner and then was speaking and then had facial droop and was not responding and so the staff started CPR and EMS soon arrived. The patient is now awake in the ER, oriented x 2 and reports chest soreness after the CPR but denies prior shortness of breath or palpitations.     ALLERGIES:  No Known Allergies      ROS:  All 10 systems reviewed and positives noted in HPI    OBJECTIVE:    VITAL SIGNS:  Vital Signs Last 24 Hrs  T(C): 37.1 (24 Sep 2024 10:35), Max: 37.1 (24 Sep 2024 10:35)  T(F): 98.7 (24 Sep 2024 10:35), Max: 98.7 (24 Sep 2024 10:35)  HR: 59 (24 Sep 2024 10:35) (59 - 59)  BP: 109/56 (24 Sep 2024 10:35) (109/56 - 109/56)  BP(mean): --  RR: 18 (24 Sep 2024 10:35) (18 - 18)  SpO2: 95% (24 Sep 2024 10:35) (95% - 95%)    Parameters below as of 24 Sep 2024 10:35  Patient On (Oxygen Delivery Method): room air        PHYSICAL EXAM:  General: no distress  HEENT: sclera anicteric  Neck: supple,  CVS: JVP ~ 7 cm H20, RRR, s1, s2, 3/6 systolic murmur  Chest: unlabored respirations, clear to auscultation b/l  Abdomen: non-distended  Extremities: no lower extremity edema b/l  Neuro: awake, alert & oriented x 2  Psych: normal affect      LABS:                        14.6   11.09 )-----------( 239      ( 24 Sep 2024 11:02 )             42.5     09-24    140  |  102  |  12  ----------------------------<  124[H]  4.0   |  29  |  0.95    Ca    9.6      24 Sep 2024 11:02    TPro  7.9  /  Alb  3.5  /  TBili  0.7  /  DBili  x   /  AST  45[H]  /  ALT  37  /  AlkPhos  99  09-24            TTE (2/2023):  Normal LV and RV systolic function    ECG (9/24/24): normal sinus rhythm, PAC, LVH

## 2024-09-24 NOTE — ED PROVIDER NOTE - PROGRESS NOTE DETAILS
Discussed and updated both sons who are at the bedside and explained to them their mom came in following an episode of syncope characterized by a temporary loss of consciousness. The patient was noted to have a faint pulse at the time. We performed blood work, which showed that her blood counts, electrolytes, and kidney function were normal. However, there was a slight elevation in her troponin levels, which are indicative of potential heart injury. This could potentially be due to the CPR performed on her but we decided to trend this further. Another test conducted was a CAT scan, which confirmed that her brain was functional and there were no abnormalities. Programs like telemetry were initiated for constant monitoring. Unfortunately, over the last 24 hours, the patient has shown signs of confusion. Initial thoughts were that she could be dehydrated, but that seemed unlikely after further analysis. As a precaution, we screened her for a urinary tract infection. Currently, the plan is to have her observed within our telemetry floor to check for any other incidents of syncope, especially considering the fact that she has had prior occurrences of such episodes as confirmed by her chart and a third party, Cesia at [facility]. During her stay with us, we intend to contact a cardiologist to assess the patient’s condition as well. She is to remain in the hospital for 24-48 hours for monitoring.

## 2024-09-24 NOTE — H&P ADULT - ASSESSMENT
Patient is an 84 year old woman with PMH significant for Dementia, chronic anemia, B 12 deficiency, HTN, Hyperlipidemia, depression/anxiety, GERD, h/o bradycardia, h/o DVT currently not on anticoagulation, h/o episodes of unresponsiveness in the past at which time she was admitted to [facility]  admitted with an episode of confusion followed by unresponsiveness.    1. Syncope/ unresponsiveness  associated with confusion  Pt with prior h/o similar episodes. Possible syncope, possible TIA , possible seizure. CT neg for acute findings, EKG NSR , no acute ischemic changes  - start  ASA  - monitor on telemetry  - ECHO  -Lipid Panel  - Cardiology and Neurology consult  - PT consult     2. Leukocytosis  - mild, likely reactive. pt is afebrile, U/A normal, O2 stable, no cough, diarrhea  - will monitor    3. HTN  - continue Losartan and metoprolol with parameters    4. Hyperlipidemia  - check lipid panel  - continue statin    5. Dementia  - continue Trazadone and Risperidone

## 2024-09-24 NOTE — H&P ADULT - HISTORY OF PRESENT ILLNESS
Patient is an 84 year old woman with PMH significant for Dementia, chronic anemia, B 12 deficiency, HTN, Hyperlipidemia, depression/anxiety, GERD, h/o bradycardia, h/o DVT currently not on anticoagulation, h/o episodes of unresponsiveness in the past at which time she was admitted to [facility] was in her usual state of health last night without any complaints. This AM her aide reports that pt woke up and was  very confused, significantly more than usual. She also reports that pt was weak and was not able to get up from the bed and ambulate with a walker. Pt was able to have breakfast and wash up but at some point after breakfast, she became completely unresponsive.  Aide went to get help as pt lives in Assisted Living.  Few people came, were unable to determine if pt had a pulse and were trying to do some form of CPR though aide reports that pt was breathing on her own. Eventually [facility] came and pt remained unresponsive but had a pulse and stable BP. She then started to come back slowly with some response to verbal stimulus. While in transit, pt's mental status started to improve and by the time she got to ER, she was almost back to baseline. Pt  had stable vital signs, CT head was neg for any acute pathology, Blood work and EKG were reviewed and were unremarkable. Pt had Troponin of 69 which remained stable without a delta. Patient denies headache, dizziness, SOB, CP, N/V/ abd pain, diarrhea, dysuria. Patient is be Patient is an 84 year old woman with PMH significant for Dementia, chronic anemia, B 12 deficiency, HTN, Hyperlipidemia, depression/anxiety, GERD, h/o bradycardia, h/o DVT currently not on anticoagulation, h/o episodes of unresponsiveness in the past at which time she was admitted to [facility] was in her usual state of health last night without any complaints. This AM her aide reports that pt woke up and was  very confused, significantly more than usual. She also reports that pt was weak and was not able to get up from the bed and ambulate with a walker. Pt was able to have breakfast and wash up but at some point after breakfast, she became completely unresponsive.  Aide went to get help as pt lives in Assisted Living.  Few people came, were unable to determine if pt had a pulse and were trying to do some form of CPR though aide reports that pt was breathing on her own. Eventually [facility] came and pt remained unresponsive but had a pulse and stable BP. She then started to come back slowly with some response to verbal stimulus. While in transit, pt's mental status started to improve and by the time she got to ER, she was almost back to baseline. Pt  had stable vital signs, CT head was neg for any acute pathology, Blood work and EKG were reviewed and were unremarkable. Pt had Troponin of 69 which remained stable without a delta. Patient denies headache, dizziness, SOB, CP, N/V/ abd pain, diarrhea, dysuria. No focal neurological weakness. Patient is being admitted for further management

## 2024-09-24 NOTE — ED ADULT NURSE NOTE - CHIEF COMPLAINT QUOTE
Patient BIBA from [facility] after episode of unresponsiveness. Patient was found in her room babbling (alert and confused at baseline but normally converses). Patient then became unresponsive to stimuli and as per staff stopped breathing. Staff reports that they did CPR for two minutes, however it is unclear if patient had a pulse or not. As per EMS and patient's aide she is now completely back to baseline.

## 2024-09-24 NOTE — ED PROVIDER NOTE - OBJECTIVE STATEMENT
According to Malathi Montero, a caregiver at [facility] living [facility], Ms. Jessica Pacheco woke up around 7 AM on the day of the visit, but she was not making much sense and seemed confused. Ms. Montero noticed that Ms. Pacheco was unable to stand or bear weight initially, so she assisted her into a wheelchair. However, Ms. Pacheco was able to take a shower and bear weight on her feet during the process. After the shower, Ms. Pacheco expressed a desire to wash her hands, but when Ms. Montero went to assist her, she noticed that Ms. Pacheco had become unresponsive and lost consciousness for a few minutes. The wellness department was alerted, and they lowered Ms. Pacheco to the ground, attempting to revive her. It is unclear if cardiopulmonary resuscitation (CPR) was performed or if Ms. Pacheco had a pulse during this episode. Eventually, Ms. Pacheco regained consciousness and was brought to the hospital for evaluation. Apart from the confusion over the past 24 hours, Ms. Pacheco has been in her usual state of health, without any reported cough, vomiting, or diarrhea. In the emergency department she is at her baseline mental status without any complaints.

## 2024-09-24 NOTE — H&P ADULT - MLM HIDDEN
Pt daughter requesting fax number to send over paperwork for FMLA to be completed, fax [phone]. Jacque also requested to speak with RN regarding FMLA , transferred to   
yes

## 2024-09-24 NOTE — H&P ADULT - NSICDXFAMILYHX_GEN_ALL_CORE_FT
FAMILY HISTORY:  Mother  Still living? No  FH: gastric cancer, Age at diagnosis: Age Unknown    Sibling  Still living? Unknown  FH: diabetes mellitus, Age at diagnosis: Age Unknown

## 2024-09-24 NOTE — PATIENT PROFILE ADULT - FALL HARM RISK - HARM RISK INTERVENTIONS

## 2024-09-24 NOTE — PATIENT PROFILE ADULT - FALL HARM RISK - FACTORS
Medical Student Progress Note  Department of Internal Medicine    Please see attending physician note for final recommendations.     Patient ID: Сергей is a 72 year old male with admitted due to complicated peptic duodenal stricture.     SUBJECTIVE     No acute events overnight. Patient seen at bedside today resting comfortably. Understands he has a dilation procedure with GI later today. Reports no improvement in pain or itching and requests his IV tylenol and atarax to be given. Also requested some lotion to apply to areas of itching. Asks when he would be able to go home. BG levels remain elevated at 189 today pharm reached out and mentioned patient is not on SSI. Informed pharm that patient has been denying insulin injections. Denies any fevers, chills, N/V/D, hematemesis, melena, SOB, CP. Able to move gas.       Inpatient Meds  Current Facility-Administered Medications   Medication   • parenteral nutrition adult custom central   • OLANZapine (ZyPREXA) IM injection 2.5 mg   • parenteral nutrition adult custom central   • hydrOXYzine 50 MG/ML injection 25 mg   • acetaminophen (OFIRMEV) IV solution 720 mg   • dextrose 50 % injection 25 g   • dextrose 50 % injection 12.5 g   • glucagon (GLUCAGEN) injection 1 mg   • dextrose (GLUTOSE) 40 % gel 15 g   • dextrose (GLUTOSE) 40 % gel 30 g   • insulin regular (human) (HumuLIN R, NovoLIN R) Scheduled Dose   • sodium chloride 0.9 % injection 10 mL   • sodium chloride 0.9 % injection 10 mL   • sodium chloride 0.9 % injection 10 mL   • sodium chloride 0.9 % injection 10 mL   • sodium chloride 0.9 % injection 20 mL   • LORazepam (ATIVAN) injection 0.5 mg   • dextrose 10 % infusion   • PARENTERAL NUTRITION - DIETITIAN/PHARMACIST MANAGED   • Fat Emulsion Plant Based (Soy) 20 % injection 250 mL   • sodium chloride 0.9 % flush bag 25 mL   • Potassium Standard Replacement Protocol (Levels 3.5 and lower)   • Potassium Replacement (Levels 3.6 - 4)   • Magnesium Standard  Replacement Protocol   • Phosphorus Standard Replacement Protocol   • ondansetron (ZOFRAN) injection 4 mg   • [Held by provider] ARIPiprazole (ABILIFY) tablet 25 mg   • [Held by provider] clonazePAM (KlonoPIN) tablet 0.5 mg   • [Held by provider] mirtazapine (REMERON) tablet 30 mg   • [Held by provider] hydrOXYzine (ATARAX) tablet 50 mg   • [Held by provider] melatonin tablet 3 mg   • ondansetron (ZOFRAN ODT) disintegrating tablet 4 mg   • sodium chloride 0.9 % flush bag 25 mL   • pantoprazole (PROTONIX INJECT) injection 40 mg        OBJECTIVE     VITAL SIGNS:     Vital Last Value 24 Hour Range   Temperature 97.7 °F (36.5 °C) (11/03/23 1126) Temp  Min: 97.5 °F (36.4 °C)  Max: 97.7 °F (36.5 °C)   Pulse (!) 56 (11/03/23 1126) Pulse  Min: 54  Max: 61   Respiratory 19 (11/03/23 1126) Resp  Min: 16  Max: 19   Non-Invasive  Blood Pressure 115/82 (11/03/23 1126) BP  Min: 96/61  Max: 115/82   Pulse Oximetry 100 % (11/03/23 1126) SpO2  Min: 97 %  Max: 100 %       IINTAKE/OUTPUT:  I/O last 3 completed shifts:  In: 2510.3 [NG/GT:170; IV Piggyback:238.5]  Out: 1150 [Urine:1150]  No intake/output data recorded.    Intake/Output Summary (Last 24 hours) at 11/3/2023 1153  Last data filed at 11/2/2023 2300  Gross per 24 hour   Intake 2480.25 ml   Output 800 ml   Net 1680.25 ml       PHYSICAL EXAM    Physical Exam  Constitutional:       Comments: Cachectic.   HENT:      Head: Normocephalic.      Nose:      Comments: NJ tube in place.   Eyes:      Extraocular Movements: Extraocular movements intact.      Pupils: Pupils are equal, round, and reactive to light.      Comments: Pale conjunctivae.   Cardiovascular:      Rate and Rhythm: Regular rhythm. Bradycardia present.      Pulses: Normal pulses.      Heart sounds: Normal heart sounds. No murmur heard.     No friction rub. No gallop.   Pulmonary:      Effort: Pulmonary effort is normal. No respiratory distress.      Breath sounds: No stridor. No wheezing, rhonchi or rales.   Chest:       Chest wall: No tenderness.   Abdominal:      General: Abdomen is flat. Bowel sounds are normal. There is no distension.      Palpations: Abdomen is soft. There is no mass.      Tenderness: There is abdominal tenderness. There is no guarding.   Musculoskeletal:      Right lower leg: No edema.      Left lower leg: No edema.   Skin:     General: Skin is warm and dry.      Coloration: Skin is pale.      Findings: Bruising present.   Neurological:      General: No focal deficit present.      Mental Status: He is alert and oriented to person, place, and time.      Cranial Nerves: No cranial nerve deficit.   Psychiatric:         Mood and Affect: Mood normal.      Comments: Flat affect, frustrated.          LABS    Recent Labs   Lab 11/03/23  0637 11/02/23  0605 11/01/23  0608 10/31/23  0620 10/30/23  0630   SODIUM 133* 135 134* 131* 133*   POTASSIUM 4.3 4.3 4.0 4.7 4.4   CHLORIDE 100 104 104 104 102   CO2 28 27 25 24 26   ANIONGAP 9 8 9 8 9   BUN 31* 31* 30* 26* 22*   CREATININE 0.76 0.75 0.84 0.75 0.68   CALCIUM 8.8 8.8 8.8 8.7 8.8   GLUCOSE 189* 173* 182* 194* 201*         Recent Labs   Lab 11/03/23  0637 11/02/23  0605 11/01/23  0608 10/31/23  0620 10/30/23  0630   HGB 10.3* 10.0* 10.6* 10.6* 11.7*   HCT 32.0* 30.8* 32.3* 32.6* 37.3*    132* 133* 155 146   WBC 7.4 6.9 7.1 6.9 6.8   MCV 80.8 80.2 80.0 81.1 81.4         Recent Labs   Lab 11/03/23  0637 11/02/23  0605 11/01/23  0608 10/31/23  0620 10/30/23  0630   MG 2.1 2.0 2.0 1.9 1.9   PHOS 3.5 3.3 3.2 3.2 3.5         No results found      Recent Labs   Lab 11/03/23  0637 11/02/23  0605 11/01/23  0608 10/31/23  0620 10/30/23  0630   ALBUMIN 2.9* 2.7* 2.8* 2.7* 2.9*   AST 23 17 17 12 11       UA  Lab Results   Component Value Date    UWBC Large (A) 08/10/2023    UWBC NEGATIVE 08/09/2020    URBC Large (A) 08/10/2023    URBC NEGATIVE 08/09/2020        IMAGING    FL INTRAOPERATIVE C ARM NO REPORT   Final Result      EGD w Dilation; Catheter, w Fluoroscopy   Final  Result      IR PICC LINE INSERTION AGE 5 OR OLDER   Final Result   Impression:      Status post successful right arm PICC line placement.  Ready for use.         Electronically Signed by: PEG LOMBARDI M.D.    Signed on: 10/24/2023 4:58 PM    Workstation ID: [id number]      FL Upper GI and Small Bowel Single Contrast   Final Result         1. Limited fluoroscopic evaluation of the duodenum due to delayed gastric   emptying. Despite multiple positioning maneuvers, only a small amount of   contrast passed from the stomach into the duodenum during the fluoroscopy   portion of the exam.   2. On the 15-minute follow-up abdominal radiograph, contrast is seen in   proximal to mid small bowel loops. Which excludes high-grade duodenal   obstruction   3. Contrast reaches the colon after 3 hours.      Electronically Signed by: JUAN JOSE WILLSON M.D.    Signed on: 10/24/2023 2:42 PM    Workstation ID: [id number]      XR CHEST AP OR PA   Final Result   FINDINGS/IMPRESSION:      Enteric decompression tube traverses diaphragm with distal tip near the   gastric fundus. Distal sideport is just distal to the gastroesophageal   junction, consider further advancement for optimal positioning.      Stable heart and mediastinal contours.   Stable mild left basilar atelectasis. No pleural effusion or pneumothorax.      Cholecystectomy clips are present in the right upper quadrant. Partially   visualized spinal fusion hardware is present.               Electronically Signed by: MOY NIEVES MD    Signed on: 10/23/2023 4:28 PM    Workstation ID: [id number]      EGD   Final Result      EGD   Final Result      EGD   Final Result      XR CHEST AP OR PA   Final Result      No active cardiopulmonary disease and no interval change.               Electronically Signed by: BENJAMIN MAURICIO M.D.    Signed on: 10/18/2023 9:04 PM    Workstation ID: [id number]      EGD w Tube Placement; NG    (Results Pending)   EGD w Fluoroscopy, w  Dilation; Catheter    (Results Pending)           ASSESSMENT AND PLAN     Сергей is a 72 year old male with PMH of Dyslipidemia, GERD, iron deficiency anemia, prediabetes, anxiety, depression, schizophrenia, chronic joint pain, chronic itching and chronic weight loss, chronic lumbar back pain with radiculopathy, MDR UTI and urinary retention with chronic dunaway catheter presenting to [facility] ED on 10/18 with 2 episodes of hematemesis and later on in hospital course diagnosed with a peptic duodenal stricture.     # Peptic Duodenal Stricture   #Coffee ground emesis with hx of nonbleeding duodenal ulcers   #hx of peptic ulcer disease   # hx of iron deficiency anemia  #esophagitis   # weightloss   PLAN:   - EGD with dilation today, dilated upto 8mm, can give water PO   - previous dilation completed on 10/30 able to dilate 6mm  - NJ tube upsized today, can resume trickle feeds via NJ 10 cc/hr, no meds to be given via NJ tube  - Resume heparin ppx after procedure today   - Possible discharge in near future with TPN and NJ tube for about 2-3 weeks to reach optimal nutrition. Will reconsult surgery for further intervention   - Social work working on SNF placement that accomodates TPN and NJ tube  - Continue to hold PO meds   - IV PPI 40 mg BID  -Watch Hgb transfuse if <7   -Continue with daily labs due to TPN      #Schizophrenia  #Anxiety  #Depression   PLAN:   -Ativan 0.5 mg BID   New psych recommendations:   -Abilify Maientena 400 mg IM tomorrow   Zyprexa 2.5 mg IM q4h prn/Zyprexa Zydis 5 mg q6h prn.     Holding these psych meds at the moment.   -Abilify 25 mg daily    -Klonopin 0.5 mg daily   -Remeron 30 mg nightly      #Weightloss   #Cachexia   #Severe protein calorie malnutrition   #TPN   - Workup was completed in August and September of 2023 for cachexia and weight loss.   -Pan-CT negative except for PUD seen at gastroduodenal junction, bx negative.    -Findings likely explain the lack of oral intake and weight  loss.  PLAN:   Consulted social work to look for SNF placement due to TPN   Daughter considering [facility] but still to be discussed with patient   - PT/OT      #Hyponatremia most likely secondary to excess free water via TPN   -  today    PLAN:  -Osmolality: 290 wnl   -TPN adjusted to cater for hyponatremia    -Elevated BG could be contributing to the hyponatremia    #Hemolysis, Resolved   -T-bili: 1.2   -Lactate DH: 124 wnl   -Haptoglobin: 8.9 wnl   PLAN: ctm      #Generalized pruritus  - Hydroxyzine PRN for itching     PLAN:   -If patient endorses itching will give 25 mg of Hydroxyzine PRN intramuscualrly.      #Chronic lumbar back pain with radiculopathy  Plan:  -IV Tylenol PRN for pain       #Prediabetes   -A1c 5.7 in 2023  - B    PLAN:  - PICC line in place receiving: TPN  - Insulin (regular) 4U   - Adjusted glucose content in TPN b/c pt denies insulin injections at times.  - If glucose levels remain to be elevated will add 8U insulin into TPN.      #DEAN   #Urinary retention with chronic Mobley catheter  #Hx of ESBL and Enterococcus Faecium spp.  - DEAN Resolved, Cr: 0.78   PLAN:  - Bladder scan 10/20/23   - f/u outpatient with urology  - Replete lytes with daily CMP        PICC Line Double Lumen 10/24/23 Right Basilic (Active)   Number of days: 9       Urinary Catheter 10/18/23 (Active)   Number of days: 15          FEN:  None, Replete electrolytes as needed, NPO diet  DVT PPX: SCDs, Holding Heparin.   GI Prophylaxis: IV protonix BID  ACTIVITY: Advance per baseline.  CODE STATUS: Code Status: Full Resuscitation, decisional, Daughter Karen is SDM  DISPOSITION: [facility]    PCP: Mavis Pabon MD    Discussed with attending physician, Dr. Leija. Please see attending physician note/addendum for final recommendations.     Chelle Alarcon, MS-4   Internal Medicine Sub-I   Preferred Contact via Perfect Serve     Weakness

## 2024-09-24 NOTE — ED ADULT NURSE NOTE - NSFALLRISKINTERV_ED_ALL_ED
Assistance OOB with selected safe patient handling equipment if applicable/Assistance with ambulation/Communicate fall risk and risk factors to all staff, patient, and family/Monitor gait and stability/Provide visual cue: yellow wristband, yellow gown, etc/Reinforce activity limits and safety measures with patient and family/Call bell, personal items and telephone in reach/Instruct patient to call for assistance before getting out of bed/chair/stretcher/Non-slip footwear applied when patient is off stretcher/[place] to call system/Physically safe environment - no spills, clutter or unnecessary equipment/Purposeful Proactive Rounding/Room/bathroom lighting operational, light cord in reach

## 2024-09-24 NOTE — CONSULT NOTE ADULT - ASSESSMENT
Assessment:  Jessica Pacheco is an 84 year old woman, resident of nursing home with past medical history of Dementia, Hypertension and GERD with prior hospitalization in February 2023 at [facility] for unresponsiveness now brought in by EMS for episode of unresponsiveness.     Assessment:  Jessica Pacheco is an 84 year old woman, resident of nursing home with past medical history of Dementia, Hypertension and GERD with prior hospitalization in February 2023 at [facility] for unreponsivness brought in by EMS for unreponsive episode.    The family and aide are present at bedside and the aide reports that the patient was in a recliner and then was speaking and then had facial droop and was not responding and so the staff started CPR and EMS soon arrived. The patient is now awake in the ER, oriented x 2 and reports chest soreness after the CPR but denies prior shortness of breath or palpitations.     ECG consistent with sinus rhythm, PAC and LVH. Troponins mildly elevated which may be demand ischemia. Telemetry consistent with sinus rhythm. CT head with no acute hemorrhage but consistent with chronic lacunar infarcts. Prior echo report in 2023 consistent with normal LV and RV systolic function.     Recommendations:  [] Possible syncope: Continue to monitor on telemetry for arrhythmias or pauses. Check echo especially given systolic murmur to evaluate for aortic valve disease. Consider Neuro evaluation given reports of slurred speech.     We will continue to follow along.    Robbie Sharma MD  Cardiology

## 2024-09-24 NOTE — H&P ADULT - GASTROINTESTINAL
Camron Parkinson is a 23year old female. CC:  Patient presents with:  Pre-Op Exam: per pt      HPI:  I am seeing Camron Parkinson for preoperative evaluation at the request of Nikko Garzno DPM, for my evaluation of the patient's med not examined  LYMPH: no supraclavicular nodes  MUSCULOSKELETAL: normal ambulation  NEURO: sensation intact at L foot  PV: +2 L DP/PT pulses    ASSESSMENT AND PLAN    1.  Preop examination  The patient is cleared for the proposed procedure if the work up is Monocytes Absolute      0.10 - 0.60 x10(3) uL 0.88 (H)   Eosinophils Absolute      0.00 - 0.30 x10(3) uL 0.22   Basophils Absolute      0.00 - 0.10 x10(3) uL 0.03   Immature Granulocyte Absolute      0.00 - 1.00 x10(3) uL 0.03   Neutrophils %      % 64. 5 soft/nontender/nondistended/normal active bowel sounds details…

## 2024-09-24 NOTE — ED ADULT NURSE NOTE - OBJECTIVE STATEMENT
Pt presents to ED from the [facility] for period of unresponsiveness. As per EMS, pt became unresponsive while sitting in her recliner. Pt now at baseline mental status as per aide at bedside.

## 2024-09-24 NOTE — ED PROVIDER NOTE - PHYSICAL EXAMINATION
ATTENDING PHYSICAL EXAM DR. BACA ***GEN - NAD; well appearing; A+O x3 ***HEAD - NC/AT ***EYES/NOSE - PERRL, EOMI, mucous membranes moist, no discharge ***THROAT: Oral cavity and pharynx normal. No inflammation, swelling, exudate, or lesions.  ***NECK: Neck supple, non-tender without lymphadenopathy, no masses, no thyromegaly.   ***PULMONARY - CTA b/l, symmetric breath sounds. ***CARDIAC -s1s2, RRR, no M,G,R  ***ABDOMEN - +BS, ND, NT, soft, no guarding, no rebound, no masses   ***BACK - no CVA tenderness, Normal  spine ***EXTREMITIES - symmetric pulses, 2+ dp, capillary refill < 2 seconds, no clubbing, no cyanosis, no edema ***SKIN - no rash or bruising   ***NEUROLOGIC - alert, CN 2-12 intact, reflexes nl, sensation nl, coordination nl, finger to nose nl, motor 5/5 RUE/LUE/RLE/LLE/EHL/Plantar flexion, no pronator drift

## 2024-09-24 NOTE — ED ADULT TRIAGE NOTE - CHIEF COMPLAINT QUOTE
Patient BIBA from [facility] after episode of unresponsiveness. Patient was found in her room babbling (alert and confused at baseline but normally converses). Patient then became unresponsive to stimuli and as per staff stopped breathing. Staff reports that they did CPR for two minutes, however it is unclear if patient had a pulse or not. As per EMS and patient's aide she is now completely back to baseline.

## 2024-09-24 NOTE — ED PROVIDER NOTE - CLINICAL SUMMARY MEDICAL DECISION MAKING FREE TEXT BOX
Altered Mental Status  - Assessment : Ms. Pacheco experienced an episode of altered mental status and possible loss of consciousness in the morning, as reported by her caregiver at the assisted living facility. The exact cause of this event is unclear, but it is concerning and warrants further evaluation and observation.  Plan :    - Therapeutic Interventions : Admit Ms. Pacheco to the hospital for tele and monitoring. Provide supportive care as needed.    - Diagnostic Tests : Order an electrocardiogram (EKG), chest X-ray, and computed tomography (CT) scan of the head to rule out any underlying medical conditions or structural abnormalities.    - Referrals : Consult with a neurologist if necessary, based on the diagnostic test results and clinical presentation.      - Therapeutic Interventions : Obtain a urine sample for urinalysis and culture to rule out a urinary tract infection. If positive, initiate appropriate antibiotic treatment.    - Diagnostic Tests : Urinalysis and urine culture.    Labs and exam were inconsistent with toxic metabolic etiologies such as electrolyte disturbances (Na/Ca), hypoglycemia, and uremia; acidosis states, infection (i.e. Sepsis). History and exam make toxidromes of intoxication or withdrawal, hypoxemia or hypercarbia, liver disease or failure causing hepatic encephalopathy, endocrine emergencies (hyper/hypothyroidism, adrenal insufficiency), seizure, trauma, intracranial bleeds or ischemic stroke less likely. Presentation most consistent with possible syncopal episode

## 2024-09-24 NOTE — H&P ADULT - PATIENT'S PREFERRED PRONOUN
Bert Richardson reviewed discharge instructions with the patient. The patient verbalized understanding. All questions and concerns were addressed. The patient declined a wheelchair and is discharged ambulatory in the care of family members with instructions and prescriptions in hand. Pt is alert and oriented x 4. Respirations are clear and unlabored. Her/She

## 2024-09-25 ENCOUNTER — RESULT REVIEW (OUTPATIENT)
Age: 84
End: 2024-09-25

## 2024-09-25 LAB
ANION GAP SERPL CALC-SCNC: 10 MMOL/L — SIGNIFICANT CHANGE UP (ref 5–17)
BUN SERPL-MCNC: 20 MG/DL — SIGNIFICANT CHANGE UP (ref 7–23)
CALCIUM SERPL-MCNC: 9.2 MG/DL — SIGNIFICANT CHANGE UP (ref 8.4–10.5)
CHLORIDE SERPL-SCNC: 105 MMOL/L — SIGNIFICANT CHANGE UP (ref 96–108)
CHOLEST SERPL-MCNC: 146 MG/DL — SIGNIFICANT CHANGE UP
CO2 SERPL-SCNC: 25 MMOL/L — SIGNIFICANT CHANGE UP (ref 22–31)
CREAT SERPL-MCNC: 0.77 MG/DL — SIGNIFICANT CHANGE UP (ref 0.5–1.3)
EGFR: 75 ML/MIN/1.73M2 — SIGNIFICANT CHANGE UP
GLUCOSE SERPL-MCNC: 112 MG/DL — HIGH (ref 70–99)
HCT VFR BLD CALC: 39.7 % — SIGNIFICANT CHANGE UP (ref 34.5–45)
HDLC SERPL-MCNC: 58 MG/DL — SIGNIFICANT CHANGE UP
HGB BLD-MCNC: 13.3 G/DL — SIGNIFICANT CHANGE UP (ref 11.5–15.5)
LIPID PNL WITH DIRECT LDL SERPL: 73 MG/DL — SIGNIFICANT CHANGE UP
MAGNESIUM SERPL-MCNC: 2.1 MG/DL — SIGNIFICANT CHANGE UP (ref 1.6–2.6)
MCHC RBC-ENTMCNC: 30.9 PG — SIGNIFICANT CHANGE UP (ref 27–34)
MCHC RBC-ENTMCNC: 33.5 GM/DL — SIGNIFICANT CHANGE UP (ref 32–36)
MCV RBC AUTO: 92.1 FL — SIGNIFICANT CHANGE UP (ref 80–100)
NON HDL CHOLESTEROL: 89 MG/DL — SIGNIFICANT CHANGE UP
NRBC # BLD: 0 /100 WBCS — SIGNIFICANT CHANGE UP (ref 0–0)
PLATELET # BLD AUTO: 215 K/UL — SIGNIFICANT CHANGE UP (ref 150–400)
POTASSIUM SERPL-MCNC: 3.8 MMOL/L — SIGNIFICANT CHANGE UP (ref 3.5–5.3)
POTASSIUM SERPL-SCNC: 3.8 MMOL/L — SIGNIFICANT CHANGE UP (ref 3.5–5.3)
RBC # BLD: 4.31 M/UL — SIGNIFICANT CHANGE UP (ref 3.8–5.2)
RBC # FLD: 13.6 % — SIGNIFICANT CHANGE UP (ref 10.3–14.5)
SODIUM SERPL-SCNC: 140 MMOL/L — SIGNIFICANT CHANGE UP (ref 135–145)
TRIGL SERPL-MCNC: 85 MG/DL — SIGNIFICANT CHANGE UP
WBC # BLD: 9.61 K/UL — SIGNIFICANT CHANGE UP (ref 3.8–10.5)
WBC # FLD AUTO: 9.61 K/UL — SIGNIFICANT CHANGE UP (ref 3.8–10.5)

## 2024-09-25 PROCEDURE — 93010 ELECTROCARDIOGRAM REPORT: CPT

## 2024-09-25 PROCEDURE — 99232 SBSQ HOSP IP/OBS MODERATE 35: CPT

## 2024-09-25 PROCEDURE — 93306 TTE W/DOPPLER COMPLETE: CPT | Mod: 26

## 2024-09-25 PROCEDURE — 95816 EEG AWAKE AND DROWSY: CPT | Mod: 26

## 2024-09-25 PROCEDURE — 99222 1ST HOSP IP/OBS MODERATE 55: CPT

## 2024-09-25 RX ORDER — SENNOSIDES 8.6 MG
2 TABLET ORAL AT BEDTIME
Refills: 0 | Status: DISCONTINUED | OUTPATIENT
Start: 2024-09-25 | End: 2024-09-26

## 2024-09-25 RX ORDER — POLYVINYL ALCOHOL 1 %
1 DROPS OPHTHALMIC (EYE) DAILY
Refills: 0 | Status: DISCONTINUED | OUTPATIENT
Start: 2024-09-25 | End: 2024-09-26

## 2024-09-25 RX ORDER — ACETAMINOPHEN 325 MG
650 TABLET ORAL ONCE
Refills: 0 | Status: COMPLETED | OUTPATIENT
Start: 2024-09-25 | End: 2024-09-25

## 2024-09-25 RX ADMIN — Medication 50 MILLIGRAM(S): at 11:11

## 2024-09-25 RX ADMIN — Medication 0.25 MILLIGRAM(S): at 21:13

## 2024-09-25 RX ADMIN — Medication 2 TABLET(S): at 21:13

## 2024-09-25 RX ADMIN — ENOXAPARIN SODIUM 40 MILLIGRAM(S): 150 INJECTION SUBCUTANEOUS at 05:28

## 2024-09-25 RX ADMIN — Medication 20 MILLIGRAM(S): at 11:11

## 2024-09-25 RX ADMIN — Medication 81 MILLIGRAM(S): at 11:11

## 2024-09-25 RX ADMIN — LOSARTAN POTASSIUM 50 MILLIGRAM(S): 100 TABLET, FILM COATED ORAL at 05:28

## 2024-09-25 RX ADMIN — LIDOCAINE 1 PATCH: 50 CREAM TOPICAL at 11:12

## 2024-09-25 RX ADMIN — ROSUVASTATIN CALCIUM 5 MILLIGRAM(S): 20 TABLET, COATED ORAL at 21:12

## 2024-09-25 RX ADMIN — Medication 0.5 MILLIGRAM(S): at 18:20

## 2024-09-25 RX ADMIN — Medication 25 MILLIGRAM(S): at 05:28

## 2024-09-25 RX ADMIN — LIDOCAINE 1 PATCH: 50 CREAM TOPICAL at 19:00

## 2024-09-25 RX ADMIN — Medication 25 MILLIGRAM(S): at 18:20

## 2024-09-25 RX ADMIN — LIDOCAINE 1 PATCH: 50 CREAM TOPICAL at 23:10

## 2024-09-25 NOTE — CONSULT NOTE ADULT - ASSESSMENT
84 year old woman with PMH significant for Dementia, chronic anemia, B 12 deficiency, HTN, Hyperlipidemia, depression/anxiety, GERD, h/o bradycardia, h/o DVT currently not on anticoagulation, h/o episodes of unresponsiveness in the past s/p episode of unresponsiveness lasting over 5 minute. CPR was performed and Troponins elevated, unclear if patient did not have a pulse when care team arrived at bedside. Unclear if these recurrent episodes are secondary to Seizure or Cardiac Arrythmia       Plan:  EEG  Continuous Cardiac Monitor  No Antiseizure medications at this time  Follow up with Neurologist at [phone]  or [phone] within the next 2 weeks, MRI of Brain may be performed as outpatient and will not  acutely     I reviewed patient’s lab work listed above, CT Images and the reports that were performed on this admission.   I contacted the patient family to further investigate clinical history, and Primary Clinical Team caring for the patient  I spent 55 minutes reviewing labs, pertinent Neuroimaging, reviewing patient’s chart, examining patient and discussing clinical history and findings with the patient, their family, and the Primary Physician Team caring for the patient

## 2024-09-25 NOTE — PHYSICAL THERAPY INITIAL EVALUATION ADULT - PERTINENT HX OF CURRENT PROBLEM, REHAB EVAL
Patient is an 84 year old woman with PMH significant for Dementia, chronic anemia, B 12 deficiency, HTN, Hyperlipidemia, depression/anxiety, GERD, h/o bradycardia, h/o DVT currently not on anticoagulation, h/o episodes of unresponsiveness in the past at which time she was admitted to [facility] was in her usual state of health last night without any complaints. This AM her aide reports that pt woke up and was  very confused, significantly more than usual. She also reports that pt was weak and was not able to get up from the bed and ambulate with a walker. Pt was able to have breakfast and wash up but at some point after breakfast, she became completely unresponsive.  Aide went to get help as pt lives in Assisted Living.  Few people came, were unable to determine if pt had a pulse and were trying to do some form of CPR though aide reports that pt was breathing on her own. Eventually [facility] came and pt remained unresponsive but had a pulse and stable BP. She then started to come back slowly with some response to verbal stimulus. While in transit, pt's mental status started to improve and by the time she got to ER, she was almost back to baseline. Pt  had stable vital signs, CT head was neg for any acute pathology, Blood work and EKG were reviewed and were unremarkable. Pt had Troponin of 69 which remained stable without a delta. Patient denies headache, dizziness, SOB, CP, N/V/ abd pain, diarrhea, dysuria. No focal neurological weakness. Patient is being admitted for further management

## 2024-09-25 NOTE — PROGRESS NOTE ADULT - ASSESSMENT
Assessment:  Jessica Pacheco is an 84 year old woman, resident of nursing home with past medical history of Dementia, Hypertension and GERD with prior hospitalization in February 2023 at [facility] for unreponsiveness brought in by EMS for unreponsive episode.    The family and aide are present at bedside and the aide reports that the patient was in a recliner and then was speaking and then had facial droop and was not responding and so the staff started CPR and EMS soon arrived. The patient is now awake in the ER, oriented x 2 and reports chest soreness after the CPR but denies prior shortness of breath or palpitations.     ECG consistent with sinus rhythm, PAC and LVH. Troponins mildly elevated which may be demand ischemia. Telemetry consistent with sinus rhythm. CT head with no acute hemorrhage but consistent with chronic lacunar infarcts. Prior echo report in 2023 consistent with normal LV and RV systolic function.     Recommendations:  [] Possible syncope: Continue to monitor on telemetry for arrhythmias or pauses, none thus far. Check echo especially given systolic murmur to evaluate for aortic valve disease. May also benefit from non invasive ischemic evaluation. Consider Neuro evaluation given reports of slurred speech.     Will sign out this case to cardiologist to follow along tomorrow.    Robbie Sharma MD  Cardiology

## 2024-09-25 NOTE — PROGRESS NOTE ADULT - ATTENDING COMMENTS
admitted after an episode of unresponsiveness - syncope vs seizure  Neuro to eval  cardio follow up noted  monitor tele

## 2024-09-25 NOTE — CONSULT NOTE ADULT - SUBJECTIVE AND OBJECTIVE BOX
HPI:  Patient is an 84 year old woman with PMH significant for Dementia, chronic anemia, B 12 deficiency, HTN, Hyperlipidemia, depression/anxiety, GERD, h/o bradycardia, h/o DVT currently not on anticoagulation, h/o episodes of unresponsiveness in the past at which time she was admitted to [facility] was in her usual state of health last night without any complaints. This AM her aide reports that pt woke up and was  very confused, significantly more than usual. She also reports that pt was weak and was not able to get up from the bed and ambulate with a walker. Pt was able to have breakfast and wash up but at some point after breakfast, she became completely unresponsive.  Aide went to get help as pt lives in Assisted Living.  Few people came, were unable to determine if pt had a pulse and were trying to do some form of CPR though aide reports that pt was breathing on her own. Eventually [facility] came and pt remained unresponsive but had a pulse and stable BP. She then started to come back slowly with some response to verbal stimulus. While in transit, pt's mental status started to improve and by the time she got to ER, she was almost back to baseline. Pt  had stable vital signs, CT head was neg for any acute pathology, Blood work and EKG were reviewed and were unremarkable. Pt had Troponin of 69 which remained stable without a delta. Patient denies headache, dizziness, SOB, CP, N/V/ abd pain, diarrhea, dysuria. No focal neurological weakness. Patient is being admitted for further management (24 Sep 2024 15:11)     Subjective: I spoke w Malathi Bowles who was at bedside. Explained the events in detail. Patient was sitting in a recliner when she stopped responding. She called for help and by the time others arrived a few minutes later the patient had still not become responsive. I called the patient's son Bar Pacheco who explained that the patient has had 3 prior episodes of unresponsiveness, the first of which occurred about 5 years ago.     PAST MEDICAL & SURGICAL HISTORY:  Insomnia  HTN (hypertension)  GERD (gastroesophageal reflux disease)  DVT (deep venous thrombosis)  Dementia  HTN (hypertension)  H/O syncope  H/O major depression  History of repair of hip fracture    FAMILY HISTORY:  FH: gastric cancer (Mother)  FH: diabetes mellitus (Sibling)      MEDICATIONS  (STANDING):  aspirin enteric coated 81 milliGRAM(s) Oral daily  enoxaparin Injectable 40 milliGRAM(s) SubCutaneous every 24 hours  famotidine    Tablet 20 milliGRAM(s) Oral daily  lidocaine   4% Patch 1 Patch Transdermal daily  losartan 50 milliGRAM(s) Oral daily  metoprolol tartrate 25 milliGRAM(s) Oral two times a day  risperiDONE   Tablet 0.25 milliGRAM(s) Oral at bedtime  rosuvastatin 5 milliGRAM(s) Oral at bedtime  traZODone 50 milliGRAM(s) Oral daily       Allergies  No Known Allergies    ROS: Pertinent positives in HPI, all other ROS were reviewed and are negative.      Vital Signs Last 24 Hrs  T(C): 36.9 (25 Sep 2024 11:43), Max: 36.9 (24 Sep 2024 20:19)  T(F): 98.4 (25 Sep 2024 11:43), Max: 98.5 (24 Sep 2024 20:19)  HR: 78 (25 Sep 2024 11:43) (63 - 91)  BP: 149/74 (25 Sep 2024 11:43) (118/54 - 159/73)  BP(mean): 99 (24 Sep 2024 17:20) (70 - 99)  RR: 17 (25 Sep 2024 11:43) (15 - 18)  SpO2: 95% (25 Sep 2024 11:43) (93% - 98%)      Physical Exam:  Neurological exam:  HF: A x O x 3. Appropriately interactive, normal affect. Speech fluent, No Aphasia or paraphasic errors. Naming /repetition intact   CN: DENNIS, EOMI, VFF, facial sensation normal, no NLFD, tongue midline, Palate moves equally, SCM equal bilaterally  Motor: No pronator drift, Strength 5/5 in all 4 ext, normal bulk and tone, no tremor, rigidity or bradykinesia.    Sens: Intact to light touch / PP/ VS/ JS    Reflexes: Symmetric and normal . BJ 2+, BR 2+, KJ 2+, AJ 2+, downgoing toes b/l  Coord:  No FNFA, dysmetria, JUAN intact       Labs:   09-25    140  |  105  |  20  ----------------------------<  112[H]  3.8   |  25  |  0.77    Ca    9.2      25 Sep 2024 06:46  Mg     2.1     09-25    TPro  7.9  /  Alb  3.5  /  TBili  0.7  /  DBili  x   /  AST  45[H]  /  ALT  37  /  AlkPhos  99  09-24 09-25 Chol 146 LDL -- HDL 58 Trig 85                          13.3   9.61  )-----------( 215      ( 25 Sep 2024 06:46 )             39.7       Radiology:  CT Head No Cont (09.24.24 @ 11:13)   No acute transcortical infarct or intracranial hemorrhage. 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  Extensive white matter small vessel changes againseen.

## 2024-09-25 NOTE — DIETITIAN INITIAL EVALUATION ADULT - OTHER INFO
Patient is an 84 year old woman with PMH significant for Dementia, chronic anemia, B 12 deficiency, HTN, Hyperlipidemia, depression/anxiety, GERD, h/o bradycardia, h/o DVT currently not on anticoagulation, h/o episodes of unresponsiveness in the past at which time she was admitted to [facility]  admitted with an episode of confusion followed by unresponsiveness.  Pt tolerating diet- observed with fair intake at lunch. Aide at bedside confirms UBW 135lbs. CBW 138lbs. No edema or pressure ulcers. Pt denies N/V/D, constipation. Last BM 9/23. Continue current nutrition plan of care- NO PEPPER on meals provided.

## 2024-09-25 NOTE — DIETITIAN INITIAL EVALUATION ADULT - ORAL INTAKE PTA/DIET HISTORY
Pt from [facility]. Reports intolerance to even small amounts of fresh pepper. Intolerance to beans. Appetite good otherwise. Dislikes most seafood other than shrimp & lobster. Denies chewing/swallowing issues. Denies use of oral nutrition supplements PTA. Receives monthly B12 injections.

## 2024-09-25 NOTE — PROGRESS NOTE ADULT - ASSESSMENT
Patient is an 84 year old woman with PMH significant for Dementia, chronic anemia, B 12 deficiency, HTN, Hyperlipidemia, depression/anxiety, GERD, h/o bradycardia, h/o DVT currently not on anticoagulation, h/o episodes of unresponsiveness in the past at which time she was admitted to [facility]  admitted with an episode of confusion followed by unresponsiveness.    #Syncope/ unresponsiveness  - Prior h/o similar episodes. Possible syncope, possible TIA , possible seizure. CT neg for acute findings, EKG NSR , no acute ischemic changes, trops -ve x2  - TTE 9/25/24 EF (60-65%), AV calcification, mild MVR, Mild AR,   - C/w ASA  - Monitor on telemetry  - PT consult   - Cards recs appreciated, maybenefit from non invasive ischemic evaluation, neuro evaluation given reports of slurred speech.   - F/u Neuro recs  - F/u orthostatics    #HTN  - continue Losartan and metoprolol with parameters    #Hyperlipidemia  - F/u lipid panel  - continue statin    #Dementia  - continue Trazadone and Risperidone      d/w Dr Booker       Patient is an 84 year old woman with PMH significant for Dementia, chronic anemia, B 12 deficiency, HTN, Hyperlipidemia, depression/anxiety, GERD, h/o bradycardia, h/o DVT currently not on anticoagulation, h/o episodes of unresponsiveness in the past at which time she was admitted to [facility]  admitted with an episode of confusion followed by unresponsiveness.    #Syncope/ unresponsiveness  - Prior h/o similar episodes. Possible syncope, possible TIA , possible seizure. CT neg for acute findings, EKG NSR , no acute ischemic changes, trops -ve x2  - TTE 9/25/24 EF (60-65%), AV calcification, mild MVR, Mild AR,   - C/w ASA  - Monitor on telemetry  - PT consult   - Cards recs appreciated, maybenefit from non invasive ischemic evaluation, neuro evaluation given reports of slurred speech.   - F/u Neuro recs  - F/u orthostatics    #HTN  - continue Losartan and metoprolol with parameters    #Hyperlipidemia  - F/u lipid panel  - continue statin    #Dementia  - continue Trazadone and Risperidone      d/w Dr Booker

## 2024-09-25 NOTE — DIETITIAN INITIAL EVALUATION ADULT - PERTINENT LABORATORY DATA
09-25    140  |  105  |  20  ----------------------------<  112[H]  3.8   |  25  |  0.77    Ca    9.2      25 Sep 2024 06:46  Mg     2.1     09-25    TPro  7.9  /  Alb  3.5  /  TBili  0.7  /  DBili  x   /  AST  45[H]  /  ALT  37  /  AlkPhos  99  09-24

## 2024-09-25 NOTE — DIETITIAN INITIAL EVALUATION ADULT - PERTINENT MEDS FT
MEDICATIONS  (STANDING):  aspirin enteric coated 81 milliGRAM(s) Oral daily  enoxaparin Injectable 40 milliGRAM(s) SubCutaneous every 24 hours  famotidine    Tablet 20 milliGRAM(s) Oral daily  lidocaine   4% Patch 1 Patch Transdermal daily  losartan 50 milliGRAM(s) Oral daily  metoprolol tartrate 25 milliGRAM(s) Oral two times a day  risperiDONE   Tablet 0.25 milliGRAM(s) Oral at bedtime  rosuvastatin 5 milliGRAM(s) Oral at bedtime  traZODone 50 milliGRAM(s) Oral daily    MEDICATIONS  (PRN):  acetaminophen     Tablet .. 650 milliGRAM(s) Oral every 4 hours PRN Mild Pain (1 - 3)  melatonin 3 milliGRAM(s) Oral at bedtime PRN Insomnia

## 2024-09-25 NOTE — EEG REPORT - NS EEG TEXT BOX
GUNNER STEINBERG [id number]     Study Date: 09-25-24  Duration: 23 min  --------------------------------------------------------------------------------------------------  History:  CC/ HPI Patient is a 84y old  Female who presents with a chief complaint of Other encephalopathy     (25 Sep 2024 14:48)    MEDICATIONS  (STANDING):  aspirin enteric coated 81 milliGRAM(s) Oral daily  enoxaparin Injectable 40 milliGRAM(s) SubCutaneous every 24 hours  famotidine    Tablet 20 milliGRAM(s) Oral daily  lidocaine   4% Patch 1 Patch Transdermal daily  losartan 50 milliGRAM(s) Oral daily  metoprolol tartrate 25 milliGRAM(s) Oral two times a day  polyethylene glycol 3350 17 Gram(s) Oral daily  risperiDONE   Tablet 0.25 milliGRAM(s) Oral at bedtime  rosuvastatin 5 milliGRAM(s) Oral at bedtime  senna 2 Tablet(s) Oral at bedtime  traZODone 50 milliGRAM(s) Oral daily    --------------------------------------------------------------------------------------------------  Study Interpretation:    [[[Abbreviation Key:  PDR=alpha rhythm/posterior dominant rhythm. A-P=anterior posterior.  Amplitude: ‘very low’:<20; ‘low’:20-49; ‘medium’:; ‘high’:>150uV.  Persistence for periodic/rhythmic patterns (% of epoch) ‘rare’:<1%; ‘occasional’:1-10%; ‘frequent’:10-50%; ‘abundant’:50-90%; ‘continuous’:>90%.  Persistence for sporadic discharges: ‘rare’:<1/hr; ‘occasional’:1/min-1/hr; ‘frequent’:>1/min; ‘abundant’:>1/10 sec.  RPP=rhythmic and periodic patterns; GRDA=generalized rhythmic delta activity; FIRDA=frontal intermittent GRDA; LRDA=lateralized rhythmic delta activity; TIRDA=temporal intermittent rhythmic delta activity;  LPD=PLED=lateralized periodic discharges; GPD=generalized periodic discharges; BIPDs =bilateral independent periodic discharges; Mf=multifocal; SIRPDs=stimulus induced rhythmic, periodic, or ictal appearing discharges; BIRDs=brief potentially ictal rhythmic discharges >4 Hz, lasting .5-10s; PFA (paroxysmal bursts >13 Hz or =8 Hz <10s).  Modifiers: +F=with fast component; +S=with spike component; +R=with rhythmic component.  S-B=burst suppression pattern.  Max=maximal. N1-drowsy; N2-stage II sleep; N3-slow wave sleep. SSS/BETS=small sharp spikes/benign epileptiform transients of sleep. HV=hyperventilation; PS=photic stimulation]]]    Daily EEG Visual Analysis    FINDINGS:      Background:  Continuity: Continuous  Symmetry: Symmetric  Posterior dominant rhythm (PDR): 8.5-9 Hz, reactive to eye closure. Symmetric low-amplitude frontal beta in wakefulness.  Voltage: Normal  Anterior-Posterior Gradient: Present  Other background findings: None  Breach: Absent    Background Slowing:  Generalized slowing: None  Focal slowing: Occasional left and rare right frontotemporal focal polymorphic delta and theta slowing.    State Changes:   Drowsiness is characterized by slowing of the background activity.  Stage 2 sleep is not captured.    Interictal Findings:  None    Electrographic and Electroclinical seizures:  None    Other Clinical Events:  None    Activation Procedures:   Hyperventilation is not performed.    Photic stimulation is performed and does not elicit any abnormalities.      Artifacts:  Intermittent myogenic and movement artifacts are present.    Single-lead EKG: Regular rhythm    EEG Classification / Summary:  Abnormal routine EEG in the awake, drowsy states.   -Occasional left and rare right frontotemporal focal slowing  -No epileptiform abnormalities or seizures captured.     Clinical Impression:  -Left > right frontotemporal focal cerebral dysfunction can be structural or functional in etiology.   -No epileptiform abnormalities or seizures.          -------------------------------------------------------------------------------------------------------    Alisson Duran MD  Attending Physician, [facility] Epilepsy [place]    -------------------------------------------------------------------------------------------------------    To reach EEG technologist:  Please use the pager number for the appropriate hospital or contact the .  At [facility] - Pager #: [phone]    To reach EEG-reading physician:  EEG Reading Room Phone #: [phone]  Epilepsy Answering Service after 5PM and before 8:30AM: Phone #: [phone]

## 2024-09-26 ENCOUNTER — TRANSCRIPTION ENCOUNTER (OUTPATIENT)
Age: 84
End: 2024-09-26

## 2024-09-26 VITALS
RESPIRATION RATE: 18 BRPM | HEART RATE: 76 BPM | DIASTOLIC BLOOD PRESSURE: 68 MMHG | TEMPERATURE: 98 F | OXYGEN SATURATION: 92 % | SYSTOLIC BLOOD PRESSURE: 131 MMHG

## 2024-09-26 LAB
ALBUMIN SERPL ELPH-MCNC: 3.1 G/DL — LOW (ref 3.3–5)
ALP SERPL-CCNC: 82 U/L — SIGNIFICANT CHANGE UP (ref 40–120)
ALT FLD-CCNC: 27 U/L — SIGNIFICANT CHANGE UP (ref 10–45)
ANION GAP SERPL CALC-SCNC: 11 MMOL/L — SIGNIFICANT CHANGE UP (ref 5–17)
AST SERPL-CCNC: 22 U/L — SIGNIFICANT CHANGE UP (ref 10–40)
BILIRUB SERPL-MCNC: 0.5 MG/DL — SIGNIFICANT CHANGE UP (ref 0.2–1.2)
BUN SERPL-MCNC: 20 MG/DL — SIGNIFICANT CHANGE UP (ref 7–23)
CALCIUM SERPL-MCNC: 9.2 MG/DL — SIGNIFICANT CHANGE UP (ref 8.4–10.5)
CHLORIDE SERPL-SCNC: 103 MMOL/L — SIGNIFICANT CHANGE UP (ref 96–108)
CO2 SERPL-SCNC: 26 MMOL/L — SIGNIFICANT CHANGE UP (ref 22–31)
CREAT SERPL-MCNC: 0.71 MG/DL — SIGNIFICANT CHANGE UP (ref 0.5–1.3)
EGFR: 83 ML/MIN/1.73M2 — SIGNIFICANT CHANGE UP
GLUCOSE SERPL-MCNC: 96 MG/DL — SIGNIFICANT CHANGE UP (ref 70–99)
HCT VFR BLD CALC: 40.2 % — SIGNIFICANT CHANGE UP (ref 34.5–45)
HGB BLD-MCNC: 13.7 G/DL — SIGNIFICANT CHANGE UP (ref 11.5–15.5)
MAGNESIUM SERPL-MCNC: 2.1 MG/DL — SIGNIFICANT CHANGE UP (ref 1.6–2.6)
MCHC RBC-ENTMCNC: 31.4 PG — SIGNIFICANT CHANGE UP (ref 27–34)
MCHC RBC-ENTMCNC: 34.1 GM/DL — SIGNIFICANT CHANGE UP (ref 32–36)
MCV RBC AUTO: 92.2 FL — SIGNIFICANT CHANGE UP (ref 80–100)
NRBC # BLD: 0 /100 WBCS — SIGNIFICANT CHANGE UP (ref 0–0)
PHOSPHATE SERPL-MCNC: 2.7 MG/DL — SIGNIFICANT CHANGE UP (ref 2.5–4.5)
PLATELET # BLD AUTO: 199 K/UL — SIGNIFICANT CHANGE UP (ref 150–400)
POTASSIUM SERPL-MCNC: 3.5 MMOL/L — SIGNIFICANT CHANGE UP (ref 3.5–5.3)
POTASSIUM SERPL-SCNC: 3.5 MMOL/L — SIGNIFICANT CHANGE UP (ref 3.5–5.3)
PROT SERPL-MCNC: 7.3 G/DL — SIGNIFICANT CHANGE UP (ref 6–8.3)
RBC # BLD: 4.36 M/UL — SIGNIFICANT CHANGE UP (ref 3.8–5.2)
RBC # FLD: 13.4 % — SIGNIFICANT CHANGE UP (ref 10.3–14.5)
SODIUM SERPL-SCNC: 140 MMOL/L — SIGNIFICANT CHANGE UP (ref 135–145)
WBC # BLD: 8.58 K/UL — SIGNIFICANT CHANGE UP (ref 3.8–10.5)
WBC # FLD AUTO: 8.58 K/UL — SIGNIFICANT CHANGE UP (ref 3.8–10.5)

## 2024-09-26 PROCEDURE — 99239 HOSP IP/OBS DSCHRG MGMT >30: CPT | Mod: 25

## 2024-09-26 PROCEDURE — 99497 ADVNCD CARE PLAN 30 MIN: CPT

## 2024-09-26 PROCEDURE — 99232 SBSQ HOSP IP/OBS MODERATE 35: CPT

## 2024-09-26 RX ORDER — HYDRALAZINE HYDROCHLORIDE 100 MG/1
10 TABLET ORAL EVERY 6 HOURS
Refills: 0 | Status: DISCONTINUED | OUTPATIENT
Start: 2024-09-26 | End: 2024-09-26

## 2024-09-26 RX ORDER — LOSARTAN POTASSIUM 100 MG/1
1 TABLET, FILM COATED ORAL
Qty: 60 | Refills: 0
Start: 2024-09-26 | End: 2024-10-25

## 2024-09-26 RX ORDER — LOSARTAN POTASSIUM 100 MG/1
1 TABLET, FILM COATED ORAL
Qty: 30 | Refills: 0
Start: 2024-09-26 | End: 2024-10-25

## 2024-09-26 RX ORDER — IPRATROPIUM BROMIDE AND ALBUTEROL SULFATE .5; 3 MG/3ML; MG/3ML
0 SOLUTION RESPIRATORY (INHALATION)
Refills: 0 | DISCHARGE

## 2024-09-26 RX ORDER — LOSARTAN POTASSIUM 100 MG/1
100 TABLET, FILM COATED ORAL DAILY
Refills: 0 | Status: DISCONTINUED | OUTPATIENT
Start: 2024-09-27 | End: 2024-09-26

## 2024-09-26 RX ORDER — LIDOCAINE 50 MG/G
1 CREAM TOPICAL EVERY 24 HOURS
Refills: 0 | Status: DISCONTINUED | OUTPATIENT
Start: 2024-09-26 | End: 2024-09-26

## 2024-09-26 RX ORDER — HYDRALAZINE HYDROCHLORIDE 100 MG/1
5 TABLET ORAL ONCE
Refills: 0 | Status: COMPLETED | OUTPATIENT
Start: 2024-09-26 | End: 2024-09-26

## 2024-09-26 RX ORDER — LOSARTAN POTASSIUM 100 MG/1
50 TABLET, FILM COATED ORAL ONCE
Refills: 0 | Status: COMPLETED | OUTPATIENT
Start: 2024-09-26 | End: 2024-09-26

## 2024-09-26 RX ADMIN — HYDRALAZINE HYDROCHLORIDE 5 MILLIGRAM(S): 100 TABLET ORAL at 07:59

## 2024-09-26 RX ADMIN — Medication 20 MILLIGRAM(S): at 12:03

## 2024-09-26 RX ADMIN — Medication 81 MILLIGRAM(S): at 12:03

## 2024-09-26 RX ADMIN — LOSARTAN POTASSIUM 50 MILLIGRAM(S): 100 TABLET, FILM COATED ORAL at 05:10

## 2024-09-26 RX ADMIN — Medication 50 MILLIGRAM(S): at 12:03

## 2024-09-26 RX ADMIN — LIDOCAINE 1 PATCH: 50 CREAM TOPICAL at 08:01

## 2024-09-26 RX ADMIN — LOSARTAN POTASSIUM 50 MILLIGRAM(S): 100 TABLET, FILM COATED ORAL at 12:02

## 2024-09-26 RX ADMIN — ENOXAPARIN SODIUM 40 MILLIGRAM(S): 150 INJECTION SUBCUTANEOUS at 05:10

## 2024-09-26 RX ADMIN — Medication 17 GRAM(S): at 12:04

## 2024-09-26 RX ADMIN — LIDOCAINE 1 PATCH: 50 CREAM TOPICAL at 06:43

## 2024-09-26 RX ADMIN — Medication 25 MILLIGRAM(S): at 05:10

## 2024-09-26 NOTE — DISCHARGE NOTE PROVIDER - PROVIDER TOKENS
PROVIDER:[TOKEN:[[id number]],FOLLOWUP:[2 weeks],ESTABLISHEDPATIENT:[T]],PROVIDER:[TOKEN:[[id number]],FOLLOWUP:[2 weeks],ESTABLISHEDPATIENT:[T]] PROVIDER:[TOKEN:[[id number]],FOLLOWUP:[2 weeks],ESTABLISHEDPATIENT:[T]],PROVIDER:[TOKEN:[[id number]],FOLLOWUP:[2 weeks],ESTABLISHEDPATIENT:[T]],PROVIDER:[TOKEN:[[id number]],FOLLOWUP:[2 weeks],ESTABLISHEDPATIENT:[T]] PROVIDER:[TOKEN:[[id number]],FOLLOWUP:[2 weeks],ESTABLISHEDPATIENT:[T]],PROVIDER:[TOKEN:[[id number]],FOLLOWUP:[2 weeks],ESTABLISHEDPATIENT:[T]],PROVIDER:[TOKEN:[[id number]]]

## 2024-09-26 NOTE — DISCHARGE NOTE PROVIDER - CARE PROVIDERS DIRECT ADDRESSES
,DirectAddress_Unknown,[email or web address] ,[email or web address],[email or web address] ,DirectAddress_Unknown,[email or web address],DirectAddress_Unknown

## 2024-09-26 NOTE — DISCHARGE NOTE NURSING/CASE MANAGEMENT/SOCIAL WORK - NSDCFUADDAPPT_GEN_ALL_CORE_FT
D/c to [facility] with Dr. Peres as pcp    APPTS ARE READY TO BE MADE: [ ] YES  Family   Best or Patient Contact (if needed):    Additional Information about above appointments (if needed):    1: PCP  2: Cardiology  3:     Other comments or requests:

## 2024-09-26 NOTE — PROGRESS NOTE ADULT - ASSESSMENT
84-year-old woman admitted for episode of unresponsiveness had adult assisted living facility.  Reportedly, patient was lying in a recliner, talking and then had a facial droop and was not responding to staff.  CPR started and EMS arrived soon afterwards.  Since hospital admission, she has been awake oriented x 2.  Current with complaints of reproducible chest pain.  On physical examination, blood pressure elevated.  She appears euvolemic.  EKG has been sinus rhythm, PAC, LVH, no acute ST changes.  Elevated troponin noted, (flat).  Echocardiogram done during this admission demonstrating preserved LV function, mild aortic regurgitation.  Elevated troponin possibly related to demand ischemia.    Recommendations  -Discussed with son regarding further ischemia evaluation.  Discussed initially starting with stress testing, however, both are declining.  They do not want to pursue stress testing at this time.  -Reasonable to increase dose of losartan 100 mg daily.  -Pain management of reproducible chest pain   -Goals of care conversations would be reasonable.  -Discussed with patient, her son and with primary team.

## 2024-09-26 NOTE — DISCHARGE NOTE PROVIDER - HOSPITAL COURSE
Patient is an 84 year old woman with PMH significant for Dementia, chronic anemia, B 12 deficiency, HTN, Hyperlipidemia, depression/anxiety, GERD, h/o bradycardia, h/o DVT currently not on anticoagulation, h/o episodes of unresponsiveness in the past admitted for an episode on unresponsiveness at her living facility. Patient got chest compressions, because help at facility was not able to appreciate a pulse but patient had a pulse and a stable BP as per EMS, was stable on transfer and at admission. Throughout her stay, patient was back at her baseline mentation. CT head was neg for any acute pathology, blood work and EKG were reviewed and were unremarkable. Patient received pain management for reproducible chest pain, likely MSK in nature. Additional cardiologic assessment included a conversation about stress testing, which the patient and family refused at this time. Neurological evaluation, including an EEG, was negative for any seizure activity. She is medically optimized to be d/c back with home OT and PT at [facility]. Follow up with PCP and cardiology within 2 weeks.     Vital Signs Last 24 Hrs  T(C): 37.2 (26 Sep 2024 05:00), Max: 37.2 (26 Sep 2024 05:00)  T(F): 99 (26 Sep 2024 05:00), Max: 99 (26 Sep 2024 05:00)  HR: 80 (26 Sep 2024 06:48) (61 - 94)  BP: 196/89 (26 Sep 2024 06:48) (149/74 - 198/93)  BP(mean): --  RR: 17 (26 Sep 2024 06:48) (17 - 19)  SpO2: 95% (26 Sep 2024 06:48) (93% - 95%)    Parameters below as of 26 Sep 2024 06:48  Patient On (Oxygen Delivery Method): nasal cannula  O2 Flow (L/min): 2    GENERAL: NAD, lying in bed comfortably  HEAD:  Atraumatic, normocephalic  HEART: Regular rate and rhythm, no murmurs, rubs, or gallops  LUNGS: Unlabored respirations.  Clear to auscultation bilaterally, no crackles, wheezing, or rhonchi  Chest: Reproducible chest pain over sternum and left ribs  ABDOMEN: Soft, nontender, nondistended  EXTREMITIES: 2+ peripheral pulses bilaterally. No clubbing, cyanosis, or edema  NERVOUS SYSTEM:  A&Ox3, moving all extremities     Patient is an 84 year old woman with PMH significant for Dementia, chronic anemia, B 12 deficiency, HTN, Hyperlipidemia, depression/anxiety, GERD, h/o bradycardia, h/o DVT currently not on anticoagulation, h/o episodes of unresponsiveness in the past admitted for an episode on unresponsiveness at her living facility. Patient got chest compressions, because help at facility was not able to appreciate a pulse but patient had a pulse and a stable BP as per EMS, was stable on transfer and at admission. Throughout her stay, patient was back at her baseline mentation. CT head was neg for any acute pathology, blood work and EKG were reviewed and were unremarkable. Patient received pain management for reproducible chest pain, likely MSK in nature. Additional cardiologic assessment included a conversation about stress testing, which the patient and family refused at this time. Neurological evaluation, including an EEG, was negative for any seizure activity, follow up outpatient to discuss doing an MRI. She is medically optimized to be d/c back with home OT and PT at [facility]. Follow up with PCP, cardiology, and neurology within 2 weeks.     Vital Signs Last 24 Hrs  T(C): 37.2 (26 Sep 2024 05:00), Max: 37.2 (26 Sep 2024 05:00)  T(F): 99 (26 Sep 2024 05:00), Max: 99 (26 Sep 2024 05:00)  HR: 80 (26 Sep 2024 06:48) (61 - 94)  BP: 196/89 (26 Sep 2024 06:48) (149/74 - 198/93)  BP(mean): --  RR: 17 (26 Sep 2024 06:48) (17 - 19)  SpO2: 95% (26 Sep 2024 06:48) (93% - 95%)    Parameters below as of 26 Sep 2024 06:48  Patient On (Oxygen Delivery Method): nasal cannula  O2 Flow (L/min): 2    GENERAL: NAD, lying in bed comfortably  HEAD:  Atraumatic, normocephalic  HEART: Regular rate and rhythm, no murmurs, rubs, or gallops  LUNGS: Unlabored respirations.  Clear to auscultation bilaterally, no crackles, wheezing, or rhonchi  Chest: Reproducible chest pain over sternum and left ribs  ABDOMEN: Soft, nontender, nondistended  EXTREMITIES: 2+ peripheral pulses bilaterally. No clubbing, cyanosis, or edema  NERVOUS SYSTEM:  A&Ox3, moving all extremities     Patient is an 84 year old woman with PMH significant for Dementia, chronic anemia, B 12 deficiency, HTN, Hyperlipidemia, depression/anxiety, GERD, h/o bradycardia, h/o DVT currently not on anticoagulation, h/o episodes of unresponsiveness in the past admitted for an episode on unresponsiveness at her living facility. Patient got chest compressions, because help at facility was not able to appreciate a pulse but patient had a pulse and a stable BP as per EMS, was stable on transfer and at admission. Throughout her stay, patient was back at her baseline mentation. CT head was neg for any acute pathology, blood work and EKG were reviewed and were unremarkable. Patient received pain management for reproducible chest pain, likely MSK in nature. Additional cardiologic assessment included a conversation about stress testing, which the patient and family refused at this time. Neurological evaluation, including an EEG, was negative for any seizure activity, follow up outpatient to discuss doing an MRI and prolonged EEG. She is medically optimized to be d/c back with home OT and PT at [facility]. Follow up with PCP, cardiology, and neurology within 2 weeks.     Vital Signs Last 24 Hrs  T(C): 37.2 (26 Sep 2024 05:00), Max: 37.2 (26 Sep 2024 05:00)  T(F): 99 (26 Sep 2024 05:00), Max: 99 (26 Sep 2024 05:00)  HR: 80 (26 Sep 2024 06:48) (61 - 94)  BP: 196/89 (26 Sep 2024 06:48) (149/74 - 198/93)  BP(mean): --  RR: 17 (26 Sep 2024 06:48) (17 - 19)  SpO2: 95% (26 Sep 2024 06:48) (93% - 95%)    Parameters below as of 26 Sep 2024 06:48  Patient On (Oxygen Delivery Method): nasal cannula  O2 Flow (L/min): 2    GENERAL: NAD, lying in bed comfortably  HEAD:  Atraumatic, normocephalic  HEART: Regular rate and rhythm, no murmurs, rubs, or gallops  LUNGS: Unlabored respirations.  Clear to auscultation bilaterally, no crackles, wheezing, or rhonchi  Chest: Reproducible chest pain over sternum and left ribs  ABDOMEN: Soft, nontender, nondistended  EXTREMITIES: 2+ peripheral pulses bilaterally. No clubbing, cyanosis, or edema  NERVOUS SYSTEM:  A&Ox3, moving all extremities

## 2024-09-26 NOTE — DISCHARGE NOTE PROVIDER - NSDCCPCAREPLAN_GEN_ALL_CORE_FT
PRINCIPAL DISCHARGE DIAGNOSIS  Diagnosis: Syncope  Assessment and Plan of Treatment: You were noted to have what is called, a syncopal episode, which is an event when you temporarily lose consciousness. These events happen for a variety of reasons and you were kept in the hospital to evaluate what the cause of your event was. Thankfully, these events in themselves do not leave any long lasting effects on your body, however, they may happen again if the cause of the problem is not found and treated if need be. Many people never find out what caused their event. If you have been advised to follow up with any doctors, or specialists, please be sure to do so.  Medication changes:   - We increased your Losartan dose to 100mg daily, please make sure you take the correct dose (blood pressure medication).     PRINCIPAL DISCHARGE DIAGNOSIS  Diagnosis: Syncope  Assessment and Plan of Treatment: You were noted to have what is called, a syncopal episode, which is an event when you temporarily lose consciousness. These events happen for a variety of reasons and you were kept in the hospital to evaluate what the cause of your event was. Thankfully, these events in themselves do not leave any long lasting effects on your body, however, they may happen again if the cause of the problem is not found and treated if need be. Many people never find out what caused their event. If you have been advised to follow up with any doctors, or specialists, please be sure to do so.  Medication changes:   - We increased your Losartan dose to 100mg daily, please make sure you take the correct dose (blood pressure medication).      SECONDARY DISCHARGE DIAGNOSES  Diagnosis: Dementia, old age  Assessment and Plan of Treatment:      PRINCIPAL DISCHARGE DIAGNOSIS  Diagnosis: Syncope  Assessment and Plan of Treatment: You were noted to have what is called, a syncopal episode, which is an event when you temporarily lose consciousness. These events happen for a variety of reasons and you were kept in the hospital to evaluate what the cause of your event was. Thankfully, these events in themselves do not leave any long lasting effects on your body, however, they may happen again if the cause of the problem is not found and treated if need be. Many people never find out what caused their event. If you have been advised to follow up with any doctors, or specialists, please be sure to do so.  Medication changes:   - We increased your Losartan dose to 100mg daily, please make sure you take the correct dose (blood pressure medication).      SECONDARY DISCHARGE DIAGNOSES  Diagnosis: Demand ischemia  Assessment and Plan of Treatment: Your heart enzyme was elevated but stabilized. We discussed having a stress test completed, but you have declined at this time. Continue with your current medications.    Diagnosis: Costochondritis  Assessment and Plan of Treatment: You have chest tenderness on palpation which is likely from having chest compressions completed. You can take tylenol for pain and apply salonpas or a lidocaine patch.    Diagnosis: Dementia, old age  Assessment and Plan of Treatment:     Diagnosis: Hypertension  Assessment and Plan of Treatment: Continue taking your blood pressure medications. We increased your Losartan dose to 100 mg daily.    Diagnosis: Hyperlipidemia  Assessment and Plan of Treatment:     Diagnosis: GERD (gastroesophageal reflux disease)  Assessment and Plan of Treatment:     Diagnosis: History of sinus bradycardia  Assessment and Plan of Treatment: Your heart rate was normal while in the hospital.    Diagnosis: History of DVT (deep vein thrombosis)  Assessment and Plan of Treatment: You are not on anticoagulation at this time.    Diagnosis: History of syncope  Assessment and Plan of Treatment:

## 2024-09-26 NOTE — DISCHARGE NOTE PROVIDER - NSDCMRMEDTOKEN_GEN_ALL_CORE_FT
acetaminophen: 650 milligram(s) orally 3 times a day as needed for  mild pain  Calcium 600+D 600 mg-5 mcg (200 intl units) oral tablet: orally 2 times a day  Colace 100 mg oral capsule: 1 cap(s) orally 2 times a day  cyanocobalamin 1000 mcg/mL injectable solution: 1000 microgram(s) injectable every 4 weeks  famotidine 20 mg oral tablet: orally once a day  ipratropium-albuterol 0.5 mg-2.5 mg/3 mL inhalation solution: 3 milliliter(s) by nebulizer 2 times a day as needed for  shortness of breath and/or wheezing  lidocaine 4% topical film: Apply topically to affected area once a day (at bedtime)  loratadine 10 mg oral tablet: 1 tab(s) orally once a day  losartan 100 mg oral tablet: 1 tab(s) orally 2 times a day  melatonin 10 mg oral tablet: 1 tab(s) orally once a day (at bedtime)  metoprolol tartrate 25 mg oral tablet: 1 tab(s) orally 2 times a day  risperiDONE 0.25 mg oral tablet: 1 tab(s) orally once a day (at bedtime)  rosuvastatin 5 mg oral tablet: 1 tab(s) orally once a day  traZODone 50 mg oral tablet: orally once a day   acetaminophen: 650 milligram(s) orally 3 times a day as needed for  mild pain  Calcium 600+D 600 mg-5 mcg (200 intl units) oral tablet: orally 2 times a day  Colace 100 mg oral capsule: 1 cap(s) orally 2 times a day  cyanocobalamin 1000 mcg/mL injectable solution: 1000 microgram(s) injectable every 4 weeks  famotidine 20 mg oral tablet: orally once a day  ipratropium-albuterol 0.5 mg-2.5 mg/3 mL inhalation solution: 3 milliliter(s) by nebulizer 2 times a day as needed for  shortness of breath and/or wheezing  lidocaine 4% topical film: Apply topically to affected area once a day (at bedtime)  loratadine 10 mg oral tablet: 1 tab(s) orally once a day  losartan 100 mg oral tablet: 1 tab(s) orally once a day  melatonin 10 mg oral tablet: 1 tab(s) orally once a day (at bedtime)  metoprolol tartrate 25 mg oral tablet: 1 tab(s) orally 2 times a day  risperiDONE 0.25 mg oral tablet: 1 tab(s) orally once a day (at bedtime)  rosuvastatin 5 mg oral tablet: 1 tab(s) orally once a day  traZODone 50 mg oral tablet: orally once a day

## 2024-09-26 NOTE — DISCHARGE NOTE PROVIDER - CARE PROVIDER_API CALL
Madiha Peres  Family Medicine  [address] [place] [address]  Phone: [phone]  Fax: [phone]  Established Patient  Follow Up Time: 2 weeks    Robbie Sharma  Cardiovascular Disease  [address], [address]  [place] [address]  Phone: [phone]  Fax: [phone]  Established Patient  Follow Up Time: 2 weeks   Madiha Peres  Family Medicine  [address] [place] [address]  Phone: [phone]  Fax: [phone]  Established Patient  Follow Up Time: 2 weeks    Robbie Sharma  Cardiovascular Disease  [address] [facility] [address], [address]  [place] [address]  Phone: [phone]  Fax: [phone]  Established Patient  Follow Up Time: 2 weeks    Shawn Castrejon  Neurology  [address]  [place] [address]  Phone: [phone]  Fax: [phone]  Established Patient  Follow Up Time: 2 weeks   Madiha Peres  Family Medicine  [address] [place] [address]  Phone: [phone]  Fax: [phone]  Established Patient  Follow Up Time: 2 weeks    Robbie Sharma  Cardiovascular Disease  [address] [facility], [address]  [place] [address]  Phone: [phone]  Fax: [phone]  Established Patient  Follow Up Time: 2 weeks    Kwadwo Lewis  Neurology  [address] [place] [address]  Phone: [phone]  Fax: [phone]  Follow Up Time:

## 2024-09-26 NOTE — PROGRESS NOTE ADULT - ASSESSMENT
Patient is an 84 year old woman with PMH significant for Dementia, chronic anemia, B 12 deficiency, HTN, Hyperlipidemia, depression/anxiety, GERD, h/o bradycardia, h/o DVT currently not on anticoagulation, h/o episodes of unresponsiveness in the past at which time she was admitted to [facility]  admitted with an episode of confusion followed by unresponsiveness.    #Syncope/ unresponsiveness  - Prior h/o similar episodes. Possible syncope, possible TIA , possible seizure. CT neg for acute findings, EKG NSR , no acute ischemic changes, trops -ve x2  - TTE 9/25/24 EF (60-65%), AV calcification, mild MVR, Mild AR,   - C/w ASA  - Monitor on telemetry  - PT consult   - Cards recs appreciated, maybenefit from non invasive ischemic evaluation, neuro evaluation given reports of slurred speech.   - F/u Neuro recs  - F/u orthostatics    #HTN  - continue Losartan and metoprolol with parameters    #Hyperlipidemia  - F/u lipid panel  - continue statin    #Dementia  - continue Trazadone and Risperidone      d/w Dr Booker Patient is an 84 year old woman with PMH significant for Dementia, chronic anemia, B 12 deficiency, HTN, Hyperlipidemia, depression/anxiety, GERD, h/o bradycardia, h/o DVT currently not on anticoagulation, h/o episodes of unresponsiveness in the past at which time she was admitted to [facility]  admitted with an episode of confusion followed by unresponsiveness.    #Syncope/ unresponsiveness  - Prior h/o similar episodes. Possible syncope, possible TIA , possible seizure. CT neg for acute findings, EKG NSR , no acute ischemic changes, trops -ve x2  - TTE 9/25/24 EF (60-65%), AV calcification, mild MVR, Mild AR,   - C/w ASA  - Monitor on telemetry  - Home PT  - Neuro recs appreciated, EEG, no Antiseizure medications at this time, f/u with Neurologist within the next 2 weeks, MRI of Brain may be performed as outpatient and will not  acutely   - Cards recs appreciated, may benefit from non invasive ischemic evaluation, neuro evaluation given reports of slurred speech. Discussed with son regarding further ischemia evaluation, do not want to pursue stress testing at this time, increase losartan to 100 mg daily.    #HTN  - continue Losartan and metoprolol with parameters    #Hyperlipidemia  - F/u lipid panel  - continue statin    #Dementia  - continue Trazadone and Risperidone      d/w Dr Booker Patient is an 84 year old woman with PMH significant for Dementia, chronic anemia, B 12 deficiency, HTN, Hyperlipidemia, depression/anxiety, GERD, h/o bradycardia, h/o DVT currently not on anticoagulation, h/o episodes of unresponsiveness in the past at which time she was admitted to [facility]  admitted with an episode of confusion followed by unresponsiveness.    #Syncope/ unresponsiveness  - Prior h/o similar episodes. Possible syncope, possible TIA , possible seizure. CT neg for acute findings, EKG NSR , no acute ischemic changes, trops -ve x2  - TTE 9/25/24 EF (60-65%), AV calcification, mild MVR, Mild AR,   - C/w ASA  - Monitor on telemetry  - Home PT  - Neuro recs appreciated, EEG, no Antiseizure medications at this time, f/u with Neurologist within the next 2 weeks, MRI of Brain may be performed as outpatient and will not  acutely   - Cards recs appreciated, may benefit from non invasive ischemic evaluation, neuro evaluation given reports of slurred speech. Discussed with son regarding further ischemia evaluation, do not want to pursue stress testing at this time, increase losartan to 100 mg daily.    #HTN  - continue Losartan and metoprolol with parameters    #Hyperlipidemia  - F/u lipid panel  - continue statin    #Dementia  - continue Trazadone and Risperidone      d/w Dr Villalobos

## 2024-09-26 NOTE — DISCHARGE NOTE NURSING/CASE MANAGEMENT/SOCIAL WORK - PATIENT PORTAL LINK FT
You can access the FollowMyHealth Patient Portal offered by Health system by registering at the following website: [email or web address]. By joining [facility]’s FollowMyHealth portal, you will also be able to view your health information using other applications (apps) compatible with our system.

## 2024-09-26 NOTE — PROGRESS NOTE ADULT - ATTENDING COMMENTS
Please see resident note for full details regarding the service.     PE: A+Ox2-3, no murmurs, lungs CTA b/l, and soft/NT/ND, no lower extremity swelling     Assessment:  - Episode of unresponsiveness possible vasovagal syncope    - Elevated troponin likely demand ischemia   - Atypical chest pain likely costochondritis   - History of  Dementia, chronic anemia, B 12 deficiency, HTN, Hyperlipidemia, depression/anxiety, GERD, h/o bradycardia, h/o DVT currently not on anticoagulation, h/o episodes of unresponsiveness    Plan:   - Monitor on telemetry   - BP elevated today, will increase Losartan to 100 mg daily   - ECHO: EF 60-65%, mild MR, mild AR, mild pulmonic regurgitation, trivial pericardial effusion  - I spoke to patient and her son with cardiology (Dr. Gonzalez) at the bedside. We spoke about the risks and benefits of stress test. The patient and her sons discussed this and they do not want to pursue stress test, cardiac catheterization or further work up at this time. Cardiology agrees with increasing Losartan to 100 mg daily.   - EEG: left > right frontotemporal focal cerebral dysfunction can be structural or functional in etiology, no seizures    - I spoke to neurology regarding EEG findings, this likely suggests dementia. Can follow up with Dr. Lewis outpatient. May need prolonged EEG and MRI completed outpatient. No anti-seizure medications at this time    - PT evaluation: Home PT   - DVT ppx: Lovenox 40 mg subcutaneous daily   - Code status: Full code   - Dispo: Spoke with neurology and cardiology, pt optimized for d/c to [facility] today. 3122 completed.     I spent a total of 50 minutes on the date of this encounter coordinating the patient's care, excluding resident teaching time. This includes reviewing results/imaging and discussions with specialists, nursing, case management/social work. Further tests, medications, and procedures have been ordered as indicated. Results and the plan of care were communicated to the patient and/or their family member. Supporting documentation was completed and added to the patient's chart.

## 2024-09-26 NOTE — PROGRESS NOTE ADULT - SUBJECTIVE AND OBJECTIVE BOX
GUNNER CRUZ  [id number]      Chief Complaint: Syncope    Interval History: The patient reports feeling well today. Denies chest pain or shortness of breath.     Tele: sinus 60s BPM      Current meds:   acetaminophen     Tablet .. 650 milliGRAM(s) Oral every 4 hours PRN  aspirin enteric coated 81 milliGRAM(s) Oral daily  enoxaparin Injectable 40 milliGRAM(s) SubCutaneous every 24 hours  famotidine    Tablet 20 milliGRAM(s) Oral daily  lidocaine   4% Patch 1 Patch Transdermal daily  losartan 50 milliGRAM(s) Oral daily  melatonin 3 milliGRAM(s) Oral at bedtime PRN  metoprolol tartrate 25 milliGRAM(s) Oral two times a day  risperiDONE   Tablet 0.25 milliGRAM(s) Oral at bedtime  rosuvastatin 5 milliGRAM(s) Oral at bedtime  traZODone 50 milliGRAM(s) Oral daily      Objective:     Vital Signs:  T(C): 36.9 (09-25-24 @ 04:55), Max: 37.1 (09-24-24 @ 10:35)  HR: 63 (09-25-24 @ 04:55) (59 - 91)  BP: 159/73 (09-25-24 @ 04:55) (109/56 - 159/73)  RR: 16 (09-25-24 @ 04:55) (15 - 18)  SpO2: 98% (09-25-24 @ 04:55) (93% - 98%)      Physical Exam:   General: no distress  HEENT: sclera anicteric  Neck: supple,  CVS: JVP ~ 7 cm H20, RRR, s1, s2, 3/6 systolic murmur  Chest: unlabored respirations, clear to auscultation b/l  Abdomen: non-distended  Extremities: no lower extremity edema b/l  Neuro: awake, alert & oriented x 2  Psych: normal affect      Labs:   25 Sep 2024 06:46    140    |  105    |  20     ----------------------------<  112    3.8     |  25     |  0.77     Ca    9.2        25 Sep 2024 06:46  Mg     2.1       25 Sep 2024 06:46    TPro  7.9    /  Alb  3.5    /  TBili  0.7    /  DBili  x      /  AST  45     /  ALT  37     /  AlkPhos  99     24 Sep 2024 11:02                          13.3   9.61  )-----------( 215      ( 25 Sep 2024 06:46 )             39.7           TTE (2/2023):  Normal LV and RV systolic function    ECG (9/24/24): normal sinus rhythm, PAC, LVH  
Patient is an 84 year old woman with PMH significant for Dementia, chronic anemia, B 12 deficiency, HTN, Hyperlipidemia, depression/anxiety, GERD, h/o bradycardia, h/o DVT currently not on anticoagulation, h/o episodes of unresponsiveness in the past at which time she was admitted to [facility]  admitted with an episode of confusion followed by unresponsiveness.    Overnight Events: None  Interval HPI: Patient seen and examined at bedside. Patients reports reproducible rib pain on left side. No other acute complaints.    REVIEW OF SYSTEMS:  CONSTITUTIONAL: (-) weakness, (-) fevers, (-) chills  EYES/ENT: (-) visual changes,  (-) vertigo,  (-) throat pain   NECK:  (-) pain, (-) stiffness  RESPIRATORY:  (-) shortness of breath, (-) cough,  (-) wheezing,  (-) hemoptysis   CARDIOVASCULAR:  (+) chest pain, (-) palpitations  GASTROINTESTINAL:  (-) abdominal or epigastric pain, (-) nausea, (-) vomiting, (-) diarrhea, (-) constipation, (-) melena,  (-) hematemesis,  (-) hematochezia  GENITOURINARY: (-) dysuria, (-) frequency, (-) hematuria  NEUROLOGICAL: (-) numbness, (-) weakness  SKIN: (-) itching, (-) rashes, (-) lesions    Vital Signs Last 24 Hrs  T(C): 36.9 (25 Sep 2024 04:55), Max: 37.1 (24 Sep 2024 10:35)  T(F): 98.4 (25 Sep 2024 04:55), Max: 98.7 (24 Sep 2024 10:35)  HR: 63 (25 Sep 2024 04:55) (59 - 91)  BP: 159/73 (25 Sep 2024 04:55) (109/56 - 159/73)  BP(mean): 99 (24 Sep 2024 17:20) (70 - 99)  RR: 16 (25 Sep 2024 04:55) (15 - 18)  SpO2: 98% (25 Sep 2024 04:55) (93% - 98%)    Parameters below as of 25 Sep 2024 04:55  Patient On (Oxygen Delivery Method): nasal cannula  O2 Flow (L/min): 2      PHYSICAL EXAM:  GENERAL: NAD, lying in bed comfortably  HEAD:  Atraumatic, Normocephalic  CHEST/LUNG: Tenderness to palpation over sternum. Lungs clear to auscultation bilaterally, good air entry bilaterally; No wheezing, rales, or rhonchi. Unlabored respirations  HEART: Regular rate and rhythm. S1 and S2. Systolic murmur  ABDOMEN: Soft, Nontender, Nondistended.  EXTREMITIES:  2+ Peripheral Pulses. No clubbing, cyanosis, or edema  NERVOUS SYSTEM:  Alert & Oriented X3, speech clear.       LABS:   All Labs Personally Reviewed                         13.3   9.61  )-----------( 215      ( 25 Sep 2024 06:46 )             39.7     09-25    140  |  105  |  20  ----------------------------<  112[H]  3.8   |  25  |  0.77    Ca    9.2      25 Sep 2024 06:46  Mg     2.1     09-25    TPro  7.9  /  Alb  3.5  /  TBili  0.7  /  DBili  x   /  AST  45[H]  /  ALT  37  /  AlkPhos  99  09-24          Blood Culture:   I&O's Summary    24 Sep 2024 07:01  -  25 Sep 2024 07:00  --------------------------------------------------------  IN: 0 mL / OUT: 50 mL / NET: -50 mL      CAPILLARY BLOOD GLUCOSE          RADIOLOGY/EKG:  All Imaging and EKGs Personally Reviewed   < from: 12 Lead ECG (09.24.24 @ 10:59) >    Ventricular Rate 66 BPM    Atrial Rate 66 BPM    P-R Interval 174 ms    QRS Duration 90 ms    Q-T Interval 454 ms    QTC Calculation(Bazett) 475 ms    P Axis 66 degrees    R Axis -30 degrees    T Axis 30 degrees    Diagnosis Line Sinus rhythm with premature atrial complexes  Left axis deviation  Moderate voltage criteria for LVH, may be normal variant  Abnormal ECG    < end of copied text >      MEDICATIONS:  MEDICATIONS  (STANDING):  aspirin enteric coated 81 milliGRAM(s) Oral daily  enoxaparin Injectable 40 milliGRAM(s) SubCutaneous every 24 hours  famotidine    Tablet 20 milliGRAM(s) Oral daily  lidocaine   4% Patch 1 Patch Transdermal daily  losartan 50 milliGRAM(s) Oral daily  metoprolol tartrate 25 milliGRAM(s) Oral two times a day  risperiDONE   Tablet 0.25 milliGRAM(s) Oral at bedtime  rosuvastatin 5 milliGRAM(s) Oral at bedtime  traZODone 50 milliGRAM(s) Oral daily    
SUBJ:  Patient is a 84y old  Female who presents with a chief complaint of Other encephalopathy   (25 Sep 2024 14:48)    The patient is seen and the chart is reviewed.  Case was discussed with Dr. Swain.  Patient sitting in bed, appears comfortable, denies any distress.  Son at bedside.  She has complaints of reproducible left-sided chest pain, pinpoint in nature.      PAST MEDICAL & SURGICAL HISTORY:  Insomnia      HTN (hypertension)      GERD (gastroesophageal reflux disease)      DVT (deep venous thrombosis)      Dementia      HTN (hypertension)      H/O syncope      H/O major depression      History of repair of hip fracture          MEDICATIONS  (STANDING):  aspirin enteric coated 81 milliGRAM(s) Oral daily  enoxaparin Injectable 40 milliGRAM(s) SubCutaneous every 24 hours  famotidine    Tablet 20 milliGRAM(s) Oral daily  lidocaine   4% Patch 1 Patch Transdermal daily  lidocaine   4% Patch 1 Patch Transdermal every 24 hours  losartan 50 milliGRAM(s) Oral daily  metoprolol tartrate 25 milliGRAM(s) Oral two times a day  polyethylene glycol 3350 17 Gram(s) Oral daily  risperiDONE   Tablet 0.25 milliGRAM(s) Oral at bedtime  rosuvastatin 5 milliGRAM(s) Oral at bedtime  senna 2 Tablet(s) Oral at bedtime  traZODone 50 milliGRAM(s) Oral daily    MEDICATIONS  (PRN):  acetaminophen     Tablet .. 650 milliGRAM(s) Oral every 4 hours PRN Mild Pain (1 - 3)  artificial  tears Solution 1 Drop(s) Both EYES daily PRN Dry Eyes  hydrALAZINE Injectable 10 milliGRAM(s) IV Push every 6 hours PRN sBP > 160  melatonin 3 milliGRAM(s) Oral at bedtime PRN Insomnia          Vital Signs Last 24 Hrs  T(C): 37.2 (26 Sep 2024 05:00), Max: 37.2 (26 Sep 2024 05:00)  T(F): 99 (26 Sep 2024 05:00), Max: 99 (26 Sep 2024 05:00)  HR: 80 (26 Sep 2024 06:48) (61 - 94)  BP: 196/89 (26 Sep 2024 06:48) (149/74 - 198/93)  BP(mean): --  RR: 17 (26 Sep 2024 06:48) (17 - 19)  SpO2: 95% (26 Sep 2024 06:48) (93% - 95%)    Parameters below as of 26 Sep 2024 06:48  Patient On (Oxygen Delivery Method): nasal cannula  O2 Flow (L/min): 2      REVIEW OF SYSTEMS:  CONSTITUTIONAL: No fever, weight loss, or fatigue  RESPIRATORY: No cough, wheezing, chills or hemoptysis; No shortness of breath  CARDIOVASCULAR: No chest pain or chest pressure.  No shortness of breath or dyspnea on exertion.  No palpitations, dizziness, light headedness, syncope or near syncope.  No edema, no orthopnea.   NEUROLOGICAL: No headaches, memory loss, loss of strength, numbness, or tremors      PHYSICAL EXAM  Constitutional:  WDWN. No acute distress  HEENT: normocephalic, atraumatic.  PERRLA. EOMI  Neck : No JVD. no carotid bruits  Lungs:  clear to auscultation bilaterally. no rhonchi. no wheezing  Heart:  S1 and S2. No S3, S4. I/VI systolic murmur.  Abdomen:  soft, non tender.  Extremities: No clubbing, cyanoisis or edema  Nuerologic:  A+O x 3. No focal deficits  Skin:  no rashes        LABS:                        13.7   8.58  )-----------( 199      ( 26 Sep 2024 06:18 )             40.2     09-26    140  |  103  |  20  ----------------------------<  96  3.5   |  26  |  0.71    Ca    9.2      26 Sep 2024 06:18  Phos  2.7     09-26  Mg     2.1     09-26    TPro  7.3  /  Alb  3.1[L]  /  TBili  0.5  /  DBili  x   /  AST  22  /  ALT  27  /  AlkPhos  82  09-26            I&O's Summary    25 Sep 2024 07:01  -  26 Sep 2024 07:00  --------------------------------------------------------  IN: 0 mL / OUT: 400 mL / NET: -400 mL      < from: TTE Echo Complete w/o Contrast w/ Doppler (09.25.24 @ 06:17) >   1. Normal global left ventricular systolic function.   2. Left ventricular ejection fraction, by visual estimation, is 60 to   65%.   3. Mildly increased LV wall thickness.   4. Normal left ventricular internal cavity size.   5. Indeterminate left ventricle diastolic function.   6. Normal right ventricular size and function.   7. The left atrium is normal in size.   8. The right atrium is normal in size.   9. Mild mitral annular calcification.  10. Mild thickening and calcification of the anterior and posterior   mitral valve leaflets.  11. Mild mitral valve regurgitation.  12. Aortic valve leaflet calcification with restricted opening of the non   coronary cusp.  13. Mild aortic regurgitation.  14. Mild pulmonic valve regurgitation.  15. There is mild aortic root calcification.  16. Trivial pericardial effusion.  17. There is a significant pericardial fat pad present.    < end of copied text >  < from: 12 Lead ECG (09.25.24 @ 05:33) >    Diagnosis Line Sinus rhythm with premature atrial complexes  Voltage criteria for left ventricular hypertrophy  Nonspecific T wave abnormality  Abnormal ECG  When compared with ECG of 24-SEP-2024 10:59,  Nonspecific T wave abnormality now evident in Lateral leads    < end of copied text >  < from: CT Head No Cont (09.24.24 @ 11:13) >  No acute transcortical infarct or intracranial hemorrhage. 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    Extensive white matter small vessel changes againseen.    < end of copied text >                      
Patient is an 84 year old woman with PMH significant for Dementia, chronic anemia, B 12 deficiency, HTN, Hyperlipidemia, depression/anxiety, GERD, h/o bradycardia, h/o DVT currently not on anticoagulation, h/o episodes of unresponsiveness in the past at which time she was admitted to [facility]  admitted with an episode of confusion followed by unresponsiveness.    Overnight Events: None  Interval HPI: Patient seen and examined at bedside.     REVIEW OF SYSTEMS:  CONSTITUTIONAL: (-) weakness, (-) fevers, (-) chills  EYES/ENT: (-) visual changes,  (-) vertigo,  (-) throat pain   NECK:  (-) pain, (-) stiffness  RESPIRATORY:  (-) shortness of breath, (-) cough,  (-) wheezing,  (-) hemoptysis   CARDIOVASCULAR:  (-) chest pain, (-) palpitations  GASTROINTESTINAL:  (-) abdominal or epigastric pain, (-) nausea, (-) vomiting, (-) diarrhea, (-) constipation, (-) melena,  (-) hematemesis,  (-) hematochezia  GENITOURINARY: (-) dysuria, (-) frequency, (-) hematuria  NEUROLOGICAL: (-) numbness, (-) weakness  SKIN: (-) itching, (-) rashes, (-) lesions    Vital Signs Last 24 Hrs  T(C): 37.2 (26 Sep 2024 05:00), Max: 37.2 (26 Sep 2024 05:00)  T(F): 99 (26 Sep 2024 05:00), Max: 99 (26 Sep 2024 05:00)  HR: 80 (26 Sep 2024 06:48) (61 - 94)  BP: 196/89 (26 Sep 2024 06:48) (149/74 - 198/93)  BP(mean): --  RR: 17 (26 Sep 2024 06:48) (17 - 19)  SpO2: 95% (26 Sep 2024 06:48) (93% - 95%)    Parameters below as of 26 Sep 2024 06:48  Patient On (Oxygen Delivery Method): nasal cannula  O2 Flow (L/min): 2    GENERAL: NAD, lying in bed comfortably  HEAD:  Atraumatic, normocephalic  HEART: Regular rate and rhythm, no murmurs, rubs, or gallops  LUNGS: Unlabored respirations.  Clear to auscultation bilaterally, no crackles, wheezing, or rhonchi  Chest: Reproducible chest pain over sternum and left ribs  ABDOMEN: Soft, nontender, nondistended  EXTREMITIES: 2+ peripheral pulses bilaterally. No clubbing, cyanosis, or edema  NERVOUS SYSTEM:  A&Ox3, moving all extremities    LABS:   All Labs Personally Reviewed                         13.7   8.58  )-----------( 199      ( 26 Sep 2024 06:18 )             40.2     09-26    140  |  103  |  20  ----------------------------<  96  3.5   |  26  |  0.71    Ca    9.2      26 Sep 2024 06:18  Phos  2.7     09-26  Mg     2.1     09-26    TPro  7.3  /  Alb  3.1[L]  /  TBili  0.5  /  DBili  x   /  AST  22  /  ALT  27  /  AlkPhos  82  09-26          Blood Culture:   I&O's Summary    25 Sep 2024 07:01  -  26 Sep 2024 07:00  --------------------------------------------------------  IN: 0 mL / OUT: 400 mL / NET: -400 mL      CAPILLARY BLOOD GLUCOSE          RADIOLOGY/EKG:    Clinical Impression:  -Left > right frontotemporal focal cerebral dysfunction can be structural or functional in etiology.   -No epileptiform abnormalities or seizures.      MEDICATIONS:  MEDICATIONS  (STANDING):  aspirin enteric coated 81 milliGRAM(s) Oral daily  enoxaparin Injectable 40 milliGRAM(s) SubCutaneous every 24 hours  famotidine    Tablet 20 milliGRAM(s) Oral daily  lidocaine   4% Patch 1 Patch Transdermal daily  lidocaine   4% Patch 1 Patch Transdermal every 24 hours  losartan 50 milliGRAM(s) Oral once  metoprolol tartrate 25 milliGRAM(s) Oral two times a day  polyethylene glycol 3350 17 Gram(s) Oral daily  risperiDONE   Tablet 0.25 milliGRAM(s) Oral at bedtime  rosuvastatin 5 milliGRAM(s) Oral at bedtime  senna 2 Tablet(s) Oral at bedtime  traZODone 50 milliGRAM(s) Oral daily

## 2024-09-26 NOTE — PROGRESS NOTE ADULT - PROVIDER SPECIALTY LIST ADULT
Discussed sugars, mostly good - could not get Dexcom. Reviewed MFM notes. No pyelo sxs  Will be 4TH C section; states there was a lot of scar tissue last one. WILL NEED 2 SURGEONS, working on getting that scheduled, due date falls on a Saturday.  BPP 2 weeks
Cardiology
Family Medicine
Family Medicine
Cardiology

## 2024-09-26 NOTE — CHART NOTE - NSCHARTNOTEFT_GEN_A_CORE
notified by nurse for /93, other vitals stable    due for metoprolol and losartan    examined pt at bedside, denies headache, vision changes, SOB. Endorse mild chest pain when breathing in     A&Ox1  lying in bed comfortably, not in distress  S1, S2 present  lung clear to auscultaiton   abdomen non tender  anterior chest wall tenderness  LE no edema  Neuro: answering questions appropriately, face symmetric, sensation equal bilaterally in face, tongue midline, no dysarthria, 5/5 strength in upper and lower extremities bilaterally, sensation intact in upper and lower extremities bilaterally      per chart note, unclear CPR was performed before admission for unresponsiveness    CXR on admission neg   lidocaine patch for anterior chest wall tenderness  metoprolol and losartan as prescribed, recheck BP in 1 hour
Notified by RN Yury that patient endorses chest pain that worsens with movement. Patient was seen and examined at bedside laying comfortably in bed, appears in no acute distress. She states that pain is worsened by movement and sometimes with a deep breath. Pain does not radiate. Denies palpitations, dizziness, radiation of pain into jaw/arm/back, abdominal pain, nausea, sweats, chills.   On examination, regular rate and rhythm, +murmur. Left Chest wall tender to palpation and reproducible.   EKG with NSR, premature atrial complexes, HR 64, similar to previous EKGs.   Pain likely secondary to costochondritis. Will order 1 time dose of Tylenol and monitor.

## 2024-09-26 NOTE — DISCHARGE NOTE PROVIDER - PROVIDER RX CONTACT NUMBER
Called and LVM informing pt his appt with Dr. Winkler for his right hip pain is scheduled on 12/22/2022 @10:20 am. Ask pt to contact the office if the date/time does not work for him.   [phone]

## 2024-09-26 NOTE — DISCHARGE NOTE PROVIDER - NSDCFUADDAPPT_GEN_ALL_CORE_FT
APPTS ARE READY TO BE MADE: [ ] YES    Best Family or Patient Contact (if needed):    Additional Information about above appointments (if needed):    1: PCP  2: Cardiology  3:     Other comments or requests:    APPTS ARE READY TO BE MADE: [ ] YES    Best Family or Patient Contact (if needed):    Additional Information about above appointments (if needed):    1: PCP  2: Cardiology  3: Neurology    Other comments or requests:

## 2024-10-15 PROCEDURE — 80053 COMPREHEN METABOLIC PANEL: CPT

## 2024-10-15 PROCEDURE — 83735 ASSAY OF MAGNESIUM: CPT

## 2024-10-15 PROCEDURE — 85025 COMPLETE CBC W/AUTO DIFF WBC: CPT

## 2024-10-15 PROCEDURE — 95812 EEG 41-60 MINUTES: CPT

## 2024-10-15 PROCEDURE — 85027 COMPLETE CBC AUTOMATED: CPT

## 2024-10-15 PROCEDURE — 80048 BASIC METABOLIC PNL TOTAL CA: CPT

## 2024-10-15 PROCEDURE — 84100 ASSAY OF PHOSPHORUS: CPT

## 2024-10-15 PROCEDURE — 99285 EMERGENCY DEPT VISIT HI MDM: CPT | Mod: 25

## 2024-10-15 PROCEDURE — 93005 ELECTROCARDIOGRAM TRACING: CPT

## 2024-10-15 PROCEDURE — 97161 PT EVAL LOW COMPLEX 20 MIN: CPT

## 2024-10-15 PROCEDURE — 71045 X-RAY EXAM CHEST 1 VIEW: CPT

## 2024-10-15 PROCEDURE — 81003 URINALYSIS AUTO W/O SCOPE: CPT

## 2024-10-15 PROCEDURE — 84484 ASSAY OF TROPONIN QUANT: CPT

## 2024-10-15 PROCEDURE — 36415 COLL VENOUS BLD VENIPUNCTURE: CPT

## 2024-10-15 PROCEDURE — 70450 CT HEAD/BRAIN W/O DYE: CPT | Mod: MC

## 2024-10-15 PROCEDURE — 80061 LIPID PANEL: CPT

## 2024-10-15 PROCEDURE — 97162 PT EVAL MOD COMPLEX 30 MIN: CPT

## 2024-10-15 PROCEDURE — 96360 HYDRATION IV INFUSION INIT: CPT

## 2024-10-15 PROCEDURE — 93306 TTE W/DOPPLER COMPLETE: CPT

## 2025-01-14 NOTE — ED ADULT TRIAGE NOTE - ARRIVAL FROM
Pt called requesting folic acid refill be sent to [facility] in [place]. Medication and pharmacy pended for your review.     Rupinder Wong RN   
[facility]Assisted living facility

## 2025-03-10 ENCOUNTER — INPATIENT (INPATIENT)
Facility: HOSPITAL | Age: 85
LOS: 3 days | Discharge: NURSING, REHAB OR LONG-TERM CARE | DRG: 179 | End: 2025-03-14
Attending: HOSPITALIST | Admitting: INTERNAL MEDICINE
Payer: MEDICARE

## 2025-03-10 VITALS
SYSTOLIC BLOOD PRESSURE: 188 MMHG | HEART RATE: 106 BPM | WEIGHT: 175.05 LBS | RESPIRATION RATE: 18 BRPM | HEIGHT: 63 IN | OXYGEN SATURATION: 95 % | DIASTOLIC BLOOD PRESSURE: 94 MMHG | TEMPERATURE: 98 F

## 2025-03-10 DIAGNOSIS — U07.1 COVID-19: ICD-10-CM

## 2025-03-10 DIAGNOSIS — Z98.890 OTHER SPECIFIED POSTPROCEDURAL STATES: Chronic | ICD-10-CM

## 2025-03-10 LAB
ALBUMIN SERPL ELPH-MCNC: 3.7 G/DL — SIGNIFICANT CHANGE UP (ref 3.3–5)
ALP SERPL-CCNC: 92 U/L — SIGNIFICANT CHANGE UP (ref 40–120)
ALT FLD-CCNC: 23 U/L — SIGNIFICANT CHANGE UP (ref 10–45)
ANION GAP SERPL CALC-SCNC: 11 MMOL/L — SIGNIFICANT CHANGE UP (ref 5–17)
APTT BLD: 40.3 SEC — HIGH (ref 24.5–35.6)
AST SERPL-CCNC: 25 U/L — SIGNIFICANT CHANGE UP (ref 10–40)
BASOPHILS # BLD AUTO: 0.02 K/UL — SIGNIFICANT CHANGE UP (ref 0–0.2)
BASOPHILS NFR BLD AUTO: 0.2 % — SIGNIFICANT CHANGE UP (ref 0–2)
BILIRUB SERPL-MCNC: 0.9 MG/DL — SIGNIFICANT CHANGE UP (ref 0.2–1.2)
BLD GP AB SCN SERPL QL: SIGNIFICANT CHANGE UP
BUN SERPL-MCNC: 11 MG/DL — SIGNIFICANT CHANGE UP (ref 7–23)
CALCIUM SERPL-MCNC: 9 MG/DL — SIGNIFICANT CHANGE UP (ref 8.4–10.5)
CHLORIDE SERPL-SCNC: 101 MMOL/L — SIGNIFICANT CHANGE UP (ref 96–108)
CO2 SERPL-SCNC: 24 MMOL/L — SIGNIFICANT CHANGE UP (ref 22–31)
CREAT SERPL-MCNC: 0.82 MG/DL — SIGNIFICANT CHANGE UP (ref 0.5–1.3)
EGFR: 69 ML/MIN/1.73M2 — SIGNIFICANT CHANGE UP
EGFR: 69 ML/MIN/1.73M2 — SIGNIFICANT CHANGE UP
EOSINOPHIL # BLD AUTO: 0.01 K/UL — SIGNIFICANT CHANGE UP (ref 0–0.5)
EOSINOPHIL NFR BLD AUTO: 0.1 % — SIGNIFICANT CHANGE UP (ref 0–6)
FLUAV AG NPH QL: SIGNIFICANT CHANGE UP
FLUBV AG NPH QL: SIGNIFICANT CHANGE UP
GLUCOSE SERPL-MCNC: 165 MG/DL — HIGH (ref 70–99)
HCT VFR BLD CALC: 44 % — SIGNIFICANT CHANGE UP (ref 34.5–45)
HGB BLD-MCNC: 14.7 G/DL — SIGNIFICANT CHANGE UP (ref 11.5–15.5)
IMM GRANULOCYTES NFR BLD AUTO: 0.7 % — SIGNIFICANT CHANGE UP (ref 0–0.9)
INR BLD: 1.05 RATIO — SIGNIFICANT CHANGE UP (ref 0.85–1.16)
LYMPHOCYTES # BLD AUTO: 0.27 K/UL — LOW (ref 1–3.3)
LYMPHOCYTES # BLD AUTO: 2.8 % — LOW (ref 13–44)
MCHC RBC-ENTMCNC: 31.3 PG — SIGNIFICANT CHANGE UP (ref 27–34)
MCHC RBC-ENTMCNC: 33.4 G/DL — SIGNIFICANT CHANGE UP (ref 32–36)
MCV RBC AUTO: 93.6 FL — SIGNIFICANT CHANGE UP (ref 80–100)
MONOCYTES # BLD AUTO: 0.5 K/UL — SIGNIFICANT CHANGE UP (ref 0–0.9)
MONOCYTES NFR BLD AUTO: 5.2 % — SIGNIFICANT CHANGE UP (ref 2–14)
NEUTROPHILS # BLD AUTO: 8.78 K/UL — HIGH (ref 1.8–7.4)
NEUTROPHILS NFR BLD AUTO: 91 % — HIGH (ref 43–77)
NRBC BLD AUTO-RTO: 0 /100 WBCS — SIGNIFICANT CHANGE UP (ref 0–0)
PLATELET # BLD AUTO: 190 K/UL — SIGNIFICANT CHANGE UP (ref 150–400)
POTASSIUM SERPL-MCNC: 3.5 MMOL/L — SIGNIFICANT CHANGE UP (ref 3.5–5.3)
POTASSIUM SERPL-SCNC: 3.5 MMOL/L — SIGNIFICANT CHANGE UP (ref 3.5–5.3)
PROT SERPL-MCNC: 8 G/DL — SIGNIFICANT CHANGE UP (ref 6–8.3)
PROTHROM AB SERPL-ACNC: 12.4 SEC — SIGNIFICANT CHANGE UP (ref 9.9–13.4)
RBC # BLD: 4.7 M/UL — SIGNIFICANT CHANGE UP (ref 3.8–5.2)
RBC # FLD: 12.8 % — SIGNIFICANT CHANGE UP (ref 10.3–14.5)
RSV RNA NPH QL NAA+NON-PROBE: SIGNIFICANT CHANGE UP
SARS-COV-2 RNA SPEC QL NAA+PROBE: DETECTED
SODIUM SERPL-SCNC: 136 MMOL/L — SIGNIFICANT CHANGE UP (ref 135–145)
TROPONIN I, HIGH SENSITIVITY RESULT: 60.2 NG/L — HIGH
TROPONIN I, HIGH SENSITIVITY RESULT: 60.3 NG/L — HIGH
WBC # BLD: 9.65 K/UL — SIGNIFICANT CHANGE UP (ref 3.8–10.5)
WBC # FLD AUTO: 9.65 K/UL — SIGNIFICANT CHANGE UP (ref 3.8–10.5)

## 2025-03-10 PROCEDURE — 70498 CT ANGIOGRAPHY NECK: CPT | Mod: 26

## 2025-03-10 PROCEDURE — 70496 CT ANGIOGRAPHY HEAD: CPT | Mod: 26

## 2025-03-10 PROCEDURE — 93010 ELECTROCARDIOGRAM REPORT: CPT

## 2025-03-10 PROCEDURE — 74177 CT ABD & PELVIS W/CONTRAST: CPT | Mod: 26

## 2025-03-10 PROCEDURE — 99285 EMERGENCY DEPT VISIT HI MDM: CPT

## 2025-03-10 PROCEDURE — 71045 X-RAY EXAM CHEST 1 VIEW: CPT | Mod: 26

## 2025-03-10 PROCEDURE — 99223 1ST HOSP IP/OBS HIGH 75: CPT | Mod: GC

## 2025-03-10 PROCEDURE — 70450 CT HEAD/BRAIN W/O DYE: CPT | Mod: 26,XU

## 2025-03-10 RX ORDER — MELATONIN 5 MG
3 TABLET ORAL AT BEDTIME
Refills: 0 | Status: DISCONTINUED | OUTPATIENT
Start: 2025-03-10 | End: 2025-03-14

## 2025-03-10 RX ORDER — ENOXAPARIN SODIUM 100 MG/ML
40 INJECTION SUBCUTANEOUS EVERY 24 HOURS
Refills: 0 | Status: DISCONTINUED | OUTPATIENT
Start: 2025-03-10 | End: 2025-03-14

## 2025-03-10 RX ORDER — REMDESIVIR 5 MG/ML
INJECTION INTRAVENOUS
Refills: 0 | Status: DISCONTINUED | OUTPATIENT
Start: 2025-03-11 | End: 2025-03-13

## 2025-03-10 RX ORDER — ACETAMINOPHEN 500 MG/5ML
650 LIQUID (ML) ORAL EVERY 6 HOURS
Refills: 0 | Status: DISCONTINUED | OUTPATIENT
Start: 2025-03-10 | End: 2025-03-14

## 2025-03-10 RX ORDER — ONDANSETRON HCL/PF 4 MG/2 ML
4 VIAL (ML) INJECTION ONCE
Refills: 0 | Status: COMPLETED | OUTPATIENT
Start: 2025-03-10 | End: 2025-03-10

## 2025-03-10 RX ORDER — MAGNESIUM, ALUMINUM HYDROXIDE 200-200 MG
30 TABLET,CHEWABLE ORAL EVERY 4 HOURS
Refills: 0 | Status: DISCONTINUED | OUTPATIENT
Start: 2025-03-10 | End: 2025-03-14

## 2025-03-10 RX ORDER — ONDANSETRON HCL/PF 4 MG/2 ML
4 VIAL (ML) INJECTION EVERY 8 HOURS
Refills: 0 | Status: DISCONTINUED | OUTPATIENT
Start: 2025-03-10 | End: 2025-03-14

## 2025-03-10 RX ADMIN — Medication 4 MILLIGRAM(S): at 19:43

## 2025-03-10 RX ADMIN — Medication 4 MILLIGRAM(S): at 20:27

## 2025-03-10 RX ADMIN — Medication 500 MILLILITER(S): at 19:44

## 2025-03-10 NOTE — H&P ADULT - NSHPPHYSICALEXAM_GEN_ALL_CORE
Vital Signs Last 24 Hrs  T(C): 36.8 (10 Mar 2025 18:40), Max: 36.8 (10 Mar 2025 18:40)  T(F): 98.2 (10 Mar 2025 18:40), Max: 98.2 (10 Mar 2025 18:40)  HR: 106 (10 Mar 2025 18:40) (106 - 106)  BP: 188/94 (10 Mar 2025 18:40) (188/94 - 188/94)  BP(mean): --  RR: 18 (10 Mar 2025 18:40) (18 - 18)  SpO2: 95% (10 Mar 2025 18:40) (95% - 95%)    Parameters below as of 10 Mar 2025 18:40  Patient On (Oxygen Delivery Method): room air    Constitutional: Pt lying in bed, drowsy, arousable to sound and touch, NAD, NC 2L in place  HEENT: EOMI, normal hearing, dry mucous membranes  Neck: Soft and supple, no JVD  Respiratory: diminished breath sounds, b/l basilar crackles, No wheezing  Cardiovascular: S1S2+, RRR, no M/G/R  Gastrointestinal: BS+, soft, NT/ND, no guarding, no rebound  Extremities: No peripheral edema  Vascular: 2+ peripheral pulses  Neurological: AAOx2-3, no focal deficits, no facial droop, cooperative, follows commands, finger to nose over-reach  Musculoskeletal: moving all extremities  Skin: No rashes

## 2025-03-10 NOTE — ED PROVIDER NOTE - PHYSICAL EXAMINATION
VITAL SIGNS: I have reviewed nursing notes and confirm.   GEN: Well-developed; well-nourished; in no acute distress. Speaking full sentences.  SKIN: Warm, pink, no rash, no diaphoresis, no cyanosis, well perfused.   HEAD: Normocephalic; atraumatic.    NECK: Supple; non tender. Full range of motion.   EYES: Pupils 3mm equal, round, reactive to light and accomodation, conjunctiva and sclera clear. Extra-ocular movements intact bilaterally.  ENT: No nasal discharge; airway clear. Trachea is midline.    CV: RRR. S1, S2 normal; no murmurs, gallops, or rubs. Capillary refill < 2 seconds throughout. Distal pulses intact 2+ throughout.  RESP: CTA bilaterally. No wheezes, rales, or rhonchi.   ABD: Normal bowel sounds, soft, non-distended, non-tender, no rebound, no guarding, no rigidity   MSK: Normal range of motion and movement of all 4 extremities. No apparent joint or muscular pain throughout.    BACK: No thoracolumbar midline or paravertebral tenderness.    NEURO: Alert & oriented x name, follows commands, somnolent. (+) LEFT ARM 4/5 strength, (+) LEFT LEG 1/5 strength, (+) facial droop. No aphasia or dysphagia. RIGHT UE/LE 5/5

## 2025-03-10 NOTE — H&P ADULT - HISTORY OF PRESENT ILLNESS
83y/o F from [facility] PMHx of dementia, HTN, HLD, h/o DVT was BIBEMS to ED for Generalized weakness, cough and vomiting upon waking up this morning. Pt had code stroke called in triage for left facial droop and weakness, and code stroke was cancelled upon review of patient as symptoms were more than 24hrs and symptoms had resolved. Pt's son, Fabiano, provided the history. Pt reports feeling sleepy. Son noted Pt is less energetic since Sunday. Denies fever, chills, chest pain, abdominal pain, or dysuria.    In the ED, VS      83y/o F from [facility] PMHx of dementia, HTN, HLD, h/o DVT was BIBEMS to ED for Generalized weakness, cough and vomiting upon waking up this morning. Pt had code stroke called in triage for left facial droop and weakness, and code stroke was cancelled upon review of patient as symptoms were more than 24hrs and symptoms had resolved. Pt's son, Fabiano, provided the history. Pt reports feeling sleepy and right eye blindness. Son noted Pt is less energetic since Sunday. Denies fever, chills, chest pain, abdominal pain, or dysuria.    In the ED, VS T 98.2F, , /94, RR 18, SpO2 95% on RA. Labs pertinent for +Covid, Trop 60.3. CXR unremarkable, CT Head showed chronic infarct, CTA Head and neck as below. CT a/p large stool burden. Pt was given 500ml IVF bolus, and zofran 4mg x2.    CT Angio Neck w/ IV Cont (03.10.25 @ 20:44)   IMPRESSION:    1.  No significant change.  2.  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  3.  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  4.  Mild narrowing of the right ICA and subclavian arteries, less than 40%.  5.  Multinodular thyroid gland.           85y/o F from [facility] PMHx of dementia, HTN, HLD, h/o DVT was BIBEMS to ED for Generalized weakness, cough and vomiting upon waking up this morning. Pt had code stroke called in triage for left facial droop and weakness, and code stroke was cancelled upon review of patient as symptoms were more than 24hrs and symptoms had resolved. NIHSS 0. Pt's son, Fabiano, provided the history. Pt reports feeling sleepy and right eye blindness. Son noted Pt is less energetic since Sunday. Denies fever, chills, chest pain, abdominal pain, or dysuria.    In the ED, VS T 98.2F, , /94, RR 18, SpO2 95% on RA. Labs pertinent for +Covid, Trop 60.3. CXR unremarkable, CT Head showed chronic infarct, CTA Head and neck as below. CT a/p large stool burden. Pt was given 500ml IVF bolus, and zofran 4mg x2.    CT Angio Neck w/ IV Cont (03.10.25 @ 20:44)   IMPRESSION:    1.  No significant change.  2.  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  3.  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  4.  Mild narrowing of the right ICA and subclavian arteries, less than 40%.  5.  Multinodular thyroid gland.

## 2025-03-10 NOTE — ED PROVIDER NOTE - CARE PLAN
1 Principal Discharge DX:	2019 novel coronavirus disease (COVID-19)  Secondary Diagnosis:	CVA (cerebrovascular accident)

## 2025-03-10 NOTE — ED ADULT TRIAGE NOTE - CHIEF COMPLAINT QUOTE
pre-note received, From Angel, left sided weakness and vomiting, LKW 1900 3/9/24 before going to bed  seen by md bass in triage, cancelled code stroke at 1840 pre-note received, From Angel, left sided weakness and vomiting, LKW 1900 3/9/24 before going to bed, patient has 24 hour aide that noticed patient had weakness this morning upon wakening   seen by md Villasenor in triage, cancelled code stroke at 1840

## 2025-03-10 NOTE — ED ADULT NURSE REASSESSMENT NOTE - NS ED NURSE REASSESS COMMENT FT1
Patient received from rylie WILDE. Pt orders being placed by oncrtiika CASTRO. Will perform as ordered. Nursing care continued.

## 2025-03-10 NOTE — H&P ADULT - CONVERSATION DETAILS
I had a detailed discussion with the patient's son regarding patient goals of care. We discussed that cardiopulmonary resuscitation (CPR) can be completed if the patient were to go into cardiac arrest. This includes chest compressions and delivering shocks if needed to restart their heart and intubation/mechanical ventilation to maintain their airway. The risks of CPR were discussed with the family including rib fractures, damage to internal organs, and the possibility of anoxic brain injury or increased physical debility. At this time, family is agreeable to chest compressions and intubation if needed to save patient's life in an emergency.

## 2025-03-10 NOTE — H&P ADULT - ATTENDING COMMENTS
I have personally seen and examined patient on the above date.  I discussed the case with Dr Fernandez and I agree with findings and plan as detailed per note above, which I have amended where appropriate.    Superseding the above.       84F [facility] resident with PMHx of Dementia, HTN, HLD, depression/anxiety, GERD, h/o bradycardia, h/o DVT not on A/C pw generalized weakness since this morning This pm reportedly with NV. On arrival to ED noted L facial droop and L sided weakness and reportedly as per ED LKW was 7PM and stroke code was cancelled as out of 24 hr window period. L facial droop/L sided weakness resolved in ED. CT head and CT angio neck/head unchanged. Subsequently COVID + and trop x2 elevated but stable at 60.   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 c/o mild HA and some sore throat and endorsed vomiting but denied any SOB, CP or abd pain or diarrhea or dysuria    Son Fabiano at bedside reported no hx of CVA/CAD and not aware of hx of DVT  in the past.     PE: afebrile P; 106 BP: 188/94 sat 95% on RA.   NAD  Neuro: AOx2 moves all fours, no facial asymmetry, tongue midline. no pronator drift, finger nose with mild dysmetria bl. unable to test heel shin sec to generalized weakness of LE.  CV: S1S2, RRR, no mgr  CL: bibasilar crackles.   Abd; soft, NT, ND, +BS  Ext: neg CCE    Labs:   wbc; 9.65 hgb: 14.7 91%N trop 60.3 repeat 60.2 COVID+  BUN/cr: 11/0.82 LFTs wnl     CT head:   IMPRESSION:    1.  Chronic infarction and senescent cerebral changes, without acute   intracranial hemorrhage.  2.  Fluid in the sinuses.    CT angio head/neck:   IMPRESSION:    1.  No significant change.  2.  Moderate to severe left MCAand right PCA stenoses.  3.  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  4.  Mild narrowing of the right ICA and subclavian arteries, less than   40%.  5.  Multinodular thyroid gland.    EKG: personally rev. NSR at 90bpm no acute ST changes  CXR: personally rev. no acute infiltrate.     AP:   84F [facility] resident with PMHx of Dementia, HTN, HLD, depression/anxiety, GERD, h/o bradycardia, h/o DVT not on A/C pw generalized weakness and vomiting with transient L facial droop/L sided weakness with COVID    # Transient L facial droop/L sided weakness with LKW at 7PM  per ED not a candidate for intervention sec to over 24hrs and sxs had resolved.   neuro checks q4 and tele monitoring for now.   dysphagia screen   check lipid, hgba1c ECHO to be complete.   neuro consult to be complete  start aspirin and continue high intensity statin.   #Generalized weakness/vomiting with COVID  IV Remdesivir  continouos pulse oximetry  check d-dimer, trend trops, check CPK, BNP, procal  DVT proph as per COVID protocol pending d-dimer results.   advanced diet as tolerated.   gentle IVFs overnight.   IV Decadron if hypoxic   #HTN  cont BP meds.   #HLD  cont statin.   #GOC  As per son, pt is FULL CODE    [facility] rev.  D/W Dr Hyde ED.

## 2025-03-10 NOTE — ED PROVIDER NOTE - OBJECTIVE STATEMENT
84-year-old female with a past medical history of hypertension, dementia, GERD presenting with generalized weakness and nausea/vomiting today.  Last known well 7 PM yesterday by home aide.  This morning she woke up complaining of generalized weakness and began having nausea/vomiting at around 2 PM today.  On arrival to emergency room she is 24 hours out from her last normal, so code stroke was not called for left-sided weakness.

## 2025-03-10 NOTE — ED PROVIDER NOTE - CLINICAL SUMMARY MEDICAL DECISION MAKING FREE TEXT BOX
Code stroke was not called by triage physician since 24 hours from [facility].  patient w/ PMHx of HTN,  HLD,   dementia, p/w LEFT sided weakness today, a/w nausea/vomiting., last known well time of 7pm 3/9  NIH Stroke Scale documented.  Given history and exam, I have low suspicion for infectious etiology, neurological changes secondary to toxicologic ingestions, seizures, or complex migraine. Presentation concerning for possible CVA requiring workup.  - FSG: normal, CBC, BMP, Troponin, PT/INR/PTT, T&S, EKG, CXR,   - CT Head w/ CTA brain/neck/perfusion to r/o large vessel occlusion   - CT AP for nausea/vomiting  - Tele-stroke neurology consult. Patient is NOT a TNK candidate.  - Permissive HTN < 220/120 in non-TPA pts  - Admit to Telemetry.     Case discussed w/ Dr. Vargas whom accepts the patient. COVID (+), NIHSS 0 at 1026pm. Well appearing, at her normal baseline per family daughter at bedside. (+) trop, demand ischemia from covid19?

## 2025-03-10 NOTE — H&P ADULT - ASSESSMENT
83y/o F from [facility] PMHx of dementia, HTN, HLD, h/o DVT was BIBEMS to ED for Generalized weakness, cough and vomiting upon waking up this morning. Pt admitted for further work up.    #generalized weakness  #Acute hypoxic respiratory failure 2/2 Covid  -O2 supplementation, titrate down as tolerated  -not on home oxygen  -start remdesivir  -f/u AM labs    #r/o TIA  -f/u Echo  -f/u a1c, lipid panel   -start aspirin 81mg qd and atorvastatin 80mg qhs    #mild elevated trops  -likely demand   -trend trops until peak    #HTN  -c/w home losartan, metoprolol  -monitor BP    #Dementia  -c/w trazodone and risperidone    DVT ppx: lovenox  Diet: NPO for now, advance as tolerated  GI ppx    FULL CODE    Pt and son were updated regarding plan of care.       Case was discussed with attending, Dr. Vargas 83y/o F from [facility] PMHx of dementia, HTN, HLD, h/o DVT was BIBEMS to ED for Generalized weakness, cough and vomiting upon waking up this morning. Pt admitted for further work up.    #generalized weakness  #Acute hypoxic respiratory failure 2/2 Covid  -O2 supplementation, titrate down as tolerated  -continuous O2 monitoring  -not on home oxygen  -start remdesivir  -PT/OT consults  -f/u AM labs    #?TIA  -CT head neg  -fall and aspiration precaution  -neurochecks q 4h  -remote telemetry  -passed dysphagia screen  -start aspirin 81mg qd and atorvastatin 80mg qhs  -f/u Echo  -f/u a1c, lipid panel       #mild elevated trops  -likely demand   -trend trops until peak    #HTN  -c/w home losartan, metoprolol  -monitor BP    #Dementia  -c/w trazodone and risperidone    DVT ppx: lovenox  Diet: NPO for now, advance as tolerated  GI ppx: famotidine    FULL CODE    Pt and son were updated regarding plan of care.       Case was discussed with attending, Dr. Vargas

## 2025-03-10 NOTE — ED PROVIDER NOTE - EKG/XRAY ADDITIONAL INFORMATION
Normal sinus rhythm 90 bpm, CA at 170 ms, QRS at 98 ms, QTc at 479 ms, there are no ST elevations or ST depressions.

## 2025-03-11 ENCOUNTER — RESULT REVIEW (OUTPATIENT)
Age: 85
End: 2025-03-11

## 2025-03-11 LAB
A1C WITH ESTIMATED AVERAGE GLUCOSE RESULT: 5.6 % — SIGNIFICANT CHANGE UP (ref 4–5.6)
ALBUMIN SERPL ELPH-MCNC: 3.2 G/DL — LOW (ref 3.3–5)
ALBUMIN SERPL ELPH-MCNC: 3.2 G/DL — LOW (ref 3.3–5)
ALP SERPL-CCNC: 82 U/L — SIGNIFICANT CHANGE UP (ref 40–120)
ALP SERPL-CCNC: 83 U/L — SIGNIFICANT CHANGE UP (ref 40–120)
ALT FLD-CCNC: 22 U/L — SIGNIFICANT CHANGE UP (ref 10–45)
ALT FLD-CCNC: 25 U/L — SIGNIFICANT CHANGE UP (ref 10–45)
ANION GAP SERPL CALC-SCNC: 10 MMOL/L — SIGNIFICANT CHANGE UP (ref 5–17)
AST SERPL-CCNC: 19 U/L — SIGNIFICANT CHANGE UP (ref 10–40)
AST SERPL-CCNC: 20 U/L — SIGNIFICANT CHANGE UP (ref 10–40)
BILIRUB DIRECT SERPL-MCNC: 0.1 MG/DL — SIGNIFICANT CHANGE UP (ref 0–0.3)
BILIRUB INDIRECT FLD-MCNC: 0.4 MG/DL — SIGNIFICANT CHANGE UP (ref 0.2–1)
BILIRUB SERPL-MCNC: 0.5 MG/DL — SIGNIFICANT CHANGE UP (ref 0.2–1.2)
BILIRUB SERPL-MCNC: 0.5 MG/DL — SIGNIFICANT CHANGE UP (ref 0.2–1.2)
BUN SERPL-MCNC: 10 MG/DL — SIGNIFICANT CHANGE UP (ref 7–23)
CALCIUM SERPL-MCNC: 9.3 MG/DL — SIGNIFICANT CHANGE UP (ref 8.4–10.5)
CHLORIDE SERPL-SCNC: 103 MMOL/L — SIGNIFICANT CHANGE UP (ref 96–108)
CHOLEST SERPL-MCNC: 147 MG/DL — SIGNIFICANT CHANGE UP
CK SERPL-CCNC: 49 U/L — SIGNIFICANT CHANGE UP (ref 25–170)
CO2 SERPL-SCNC: 25 MMOL/L — SIGNIFICANT CHANGE UP (ref 22–31)
CREAT SERPL-MCNC: 0.75 MG/DL — SIGNIFICANT CHANGE UP (ref 0.5–1.3)
CREAT SERPL-MCNC: 0.79 MG/DL — SIGNIFICANT CHANGE UP (ref 0.5–1.3)
D DIMER BLD IA.RAPID-MCNC: 265 NG/ML DDU — HIGH
EGFR: 73 ML/MIN/1.73M2 — SIGNIFICANT CHANGE UP
EGFR: 73 ML/MIN/1.73M2 — SIGNIFICANT CHANGE UP
EGFR: 77 ML/MIN/1.73M2 — SIGNIFICANT CHANGE UP
EGFR: 77 ML/MIN/1.73M2 — SIGNIFICANT CHANGE UP
ESTIMATED AVERAGE GLUCOSE: 114 MG/DL — SIGNIFICANT CHANGE UP (ref 68–114)
GLUCOSE SERPL-MCNC: 122 MG/DL — HIGH (ref 70–99)
HCT VFR BLD CALC: 42.7 % — SIGNIFICANT CHANGE UP (ref 34.5–45)
HDLC SERPL-MCNC: 61 MG/DL — SIGNIFICANT CHANGE UP
HGB BLD-MCNC: 13.8 G/DL — SIGNIFICANT CHANGE UP (ref 11.5–15.5)
INR BLD: 1.03 RATIO — SIGNIFICANT CHANGE UP (ref 0.85–1.16)
LIPID PNL WITH DIRECT LDL SERPL: 72 MG/DL — SIGNIFICANT CHANGE UP
MCHC RBC-ENTMCNC: 30.9 PG — SIGNIFICANT CHANGE UP (ref 27–34)
MCHC RBC-ENTMCNC: 32.3 G/DL — SIGNIFICANT CHANGE UP (ref 32–36)
MCV RBC AUTO: 95.5 FL — SIGNIFICANT CHANGE UP (ref 80–100)
NON HDL CHOLESTEROL: 86 MG/DL — SIGNIFICANT CHANGE UP
NRBC BLD AUTO-RTO: 0 /100 WBCS — SIGNIFICANT CHANGE UP (ref 0–0)
PLATELET # BLD AUTO: 186 K/UL — SIGNIFICANT CHANGE UP (ref 150–400)
POTASSIUM SERPL-MCNC: 4 MMOL/L — SIGNIFICANT CHANGE UP (ref 3.5–5.3)
POTASSIUM SERPL-SCNC: 4 MMOL/L — SIGNIFICANT CHANGE UP (ref 3.5–5.3)
PROT SERPL-MCNC: 7.6 G/DL — SIGNIFICANT CHANGE UP (ref 6–8.3)
PROT SERPL-MCNC: 7.6 G/DL — SIGNIFICANT CHANGE UP (ref 6–8.3)
PROTHROM AB SERPL-ACNC: 12.2 SEC — SIGNIFICANT CHANGE UP (ref 9.9–13.4)
RBC # BLD: 4.47 M/UL — SIGNIFICANT CHANGE UP (ref 3.8–5.2)
RBC # FLD: 13 % — SIGNIFICANT CHANGE UP (ref 10.3–14.5)
SODIUM SERPL-SCNC: 138 MMOL/L — SIGNIFICANT CHANGE UP (ref 135–145)
TRIGL SERPL-MCNC: 69 MG/DL — SIGNIFICANT CHANGE UP
TROPONIN I, HIGH SENSITIVITY RESULT: 64.2 NG/L — HIGH
WBC # BLD: 8.4 K/UL — SIGNIFICANT CHANGE UP (ref 3.8–10.5)
WBC # FLD AUTO: 8.4 K/UL — SIGNIFICANT CHANGE UP (ref 3.8–10.5)

## 2025-03-11 PROCEDURE — 99222 1ST HOSP IP/OBS MODERATE 55: CPT

## 2025-03-11 PROCEDURE — 93306 TTE W/DOPPLER COMPLETE: CPT | Mod: 26

## 2025-03-11 PROCEDURE — 99233 SBSQ HOSP IP/OBS HIGH 50: CPT

## 2025-03-11 RX ORDER — CALCIUM CARBONATE/VITAMIN D3 500MG-5MCG
1 TABLET ORAL DAILY
Refills: 0 | Status: DISCONTINUED | OUTPATIENT
Start: 2025-03-11 | End: 2025-03-14

## 2025-03-11 RX ORDER — CLOPIDOGREL BISULFATE 75 MG/1
75 TABLET, FILM COATED ORAL DAILY
Refills: 0 | Status: DISCONTINUED | OUTPATIENT
Start: 2025-03-12 | End: 2025-03-14

## 2025-03-11 RX ORDER — CLOPIDOGREL BISULFATE 75 MG/1
TABLET, FILM COATED ORAL
Refills: 0 | Status: DISCONTINUED | OUTPATIENT
Start: 2025-03-11 | End: 2025-03-14

## 2025-03-11 RX ORDER — ATORVASTATIN CALCIUM 80 MG/1
80 TABLET, FILM COATED ORAL AT BEDTIME
Refills: 0 | Status: DISCONTINUED | OUTPATIENT
Start: 2025-03-11 | End: 2025-03-14

## 2025-03-11 RX ORDER — ASPIRIN 325 MG
81 TABLET ORAL DAILY
Refills: 0 | Status: DISCONTINUED | OUTPATIENT
Start: 2025-03-11 | End: 2025-03-14

## 2025-03-11 RX ORDER — REMDESIVIR 5 MG/ML
200 INJECTION INTRAVENOUS EVERY 24 HOURS
Refills: 0 | Status: COMPLETED | OUTPATIENT
Start: 2025-03-11 | End: 2025-03-11

## 2025-03-11 RX ORDER — LOSARTAN POTASSIUM 100 MG/1
100 TABLET, FILM COATED ORAL DAILY
Refills: 0 | Status: DISCONTINUED | OUTPATIENT
Start: 2025-03-11 | End: 2025-03-14

## 2025-03-11 RX ORDER — METOPROLOL SUCCINATE 50 MG/1
25 TABLET, EXTENDED RELEASE ORAL
Refills: 0 | Status: DISCONTINUED | OUTPATIENT
Start: 2025-03-11 | End: 2025-03-14

## 2025-03-11 RX ORDER — CLOPIDOGREL BISULFATE 75 MG/1
75 TABLET, FILM COATED ORAL ONCE
Refills: 0 | Status: COMPLETED | OUTPATIENT
Start: 2025-03-11 | End: 2025-03-11

## 2025-03-11 RX ORDER — POLYETHYLENE GLYCOL 3350 17 G/17G
17 POWDER, FOR SOLUTION ORAL DAILY
Refills: 0 | Status: DISCONTINUED | OUTPATIENT
Start: 2025-03-11 | End: 2025-03-14

## 2025-03-11 RX ORDER — SENNA 187 MG
2 TABLET ORAL AT BEDTIME
Refills: 0 | Status: DISCONTINUED | OUTPATIENT
Start: 2025-03-11 | End: 2025-03-14

## 2025-03-11 RX ORDER — TRAZODONE HCL 100 MG
1 TABLET ORAL
Refills: 0 | DISCHARGE

## 2025-03-11 RX ORDER — RISPERIDONE 4 MG
0.25 TABLET ORAL AT BEDTIME
Refills: 0 | Status: DISCONTINUED | OUTPATIENT
Start: 2025-03-11 | End: 2025-03-14

## 2025-03-11 RX ORDER — REMDESIVIR 5 MG/ML
100 INJECTION INTRAVENOUS EVERY 24 HOURS
Refills: 0 | Status: DISCONTINUED | OUTPATIENT
Start: 2025-03-12 | End: 2025-03-13

## 2025-03-11 RX ORDER — TRAZODONE HCL 100 MG
100 TABLET ORAL AT BEDTIME
Refills: 0 | Status: DISCONTINUED | OUTPATIENT
Start: 2025-03-11 | End: 2025-03-14

## 2025-03-11 RX ORDER — DEXAMETHASONE 0.5 MG/1
6 TABLET ORAL DAILY
Refills: 0 | Status: DISCONTINUED | OUTPATIENT
Start: 2025-03-11 | End: 2025-03-14

## 2025-03-11 RX ADMIN — METOPROLOL SUCCINATE 25 MILLIGRAM(S): 50 TABLET, EXTENDED RELEASE ORAL at 06:53

## 2025-03-11 RX ADMIN — LOSARTAN POTASSIUM 100 MILLIGRAM(S): 100 TABLET, FILM COATED ORAL at 06:52

## 2025-03-11 RX ADMIN — Medication 0.25 MILLIGRAM(S): at 21:26

## 2025-03-11 RX ADMIN — Medication 650 MILLIGRAM(S): at 22:27

## 2025-03-11 RX ADMIN — CLOPIDOGREL BISULFATE 75 MILLIGRAM(S): 75 TABLET, FILM COATED ORAL at 15:43

## 2025-03-11 RX ADMIN — DEXAMETHASONE 6 MILLIGRAM(S): 0.5 TABLET ORAL at 12:21

## 2025-03-11 RX ADMIN — Medication 650 MILLIGRAM(S): at 21:27

## 2025-03-11 RX ADMIN — ENOXAPARIN SODIUM 40 MILLIGRAM(S): 100 INJECTION SUBCUTANEOUS at 06:52

## 2025-03-11 RX ADMIN — Medication 20 MILLIGRAM(S): at 12:23

## 2025-03-11 RX ADMIN — Medication 100 MILLIGRAM(S): at 21:26

## 2025-03-11 RX ADMIN — METOPROLOL SUCCINATE 25 MILLIGRAM(S): 50 TABLET, EXTENDED RELEASE ORAL at 18:32

## 2025-03-11 RX ADMIN — Medication 75 MILLILITER(S): at 06:52

## 2025-03-11 RX ADMIN — ATORVASTATIN CALCIUM 80 MILLIGRAM(S): 80 TABLET, FILM COATED ORAL at 21:26

## 2025-03-11 RX ADMIN — Medication 81 MILLIGRAM(S): at 12:23

## 2025-03-11 RX ADMIN — Medication 2 TABLET(S): at 21:26

## 2025-03-11 RX ADMIN — REMDESIVIR 200 MILLIGRAM(S): 5 INJECTION INTRAVENOUS at 06:52

## 2025-03-11 RX ADMIN — Medication 1 TABLET(S): at 12:24

## 2025-03-11 NOTE — ED ADULT NURSE NOTE - NSFALLHARMRISKINTERV_ED_ALL_ED

## 2025-03-11 NOTE — OCCUPATIONAL THERAPY INITIAL EVALUATION ADULT - MD ORDER
MD orders received, chart reviewed, contents noted. Pt cleared for OT initial evaluation by ANDRY Lora. Refer below for findings.

## 2025-03-11 NOTE — ED ADULT NURSE NOTE - CHIEF COMPLAINT QUOTE
pre-note received, From [facility], left sided weakness and vomiting, LKW 1900 3/9/24 before going to bed, patient has 24 hour aide that noticed patient had weakness this morning upon wakening   seen by md Villasenor in triage, cancelled code stroke at 1840

## 2025-03-11 NOTE — OCCUPATIONAL THERAPY INITIAL EVALUATION ADULT - PRECAUTIONS/LIMITATIONS, REHAB EVAL
HOB 30 degrees, Airborne/Contact, 2LPM NC/aspiration precautions/cardiac precautions/fall precautions/oxygen therapy device and L/min

## 2025-03-11 NOTE — DIETITIAN INITIAL EVALUATION ADULT - OTHER INFO
Patient noted with fair appetite at his time. Denies any chewing/swallowing difficulties. Food preferences obtained and noted on patients file with nutrition office. Electrolytes stable at this time.

## 2025-03-11 NOTE — ED ADULT NURSE NOTE - OBJECTIVE STATEMENT
Pt presents to the ED from the [facility] with c/o upper and lower left sided weakness. TRUDY 3/9 @1900. Pt has a 24 hour aide and states patient has been weaker then normal. Pt is A&Ox2 to self and place. Pt is nonambulatory. Safety precautions maintained call bell within reach.

## 2025-03-11 NOTE — OCCUPATIONAL THERAPY INITIAL EVALUATION ADULT - ADDITIONAL COMMENTS
Pt with baseline dementia, may not be reliable historian.  Per pt/chart review, pt resides at [facility], reports ambulation without use of device. Pt likely required assistance for ADLs.

## 2025-03-11 NOTE — CONSULT NOTE ADULT - SUBJECTIVE AND OBJECTIVE BOX
Patient is an 84 year old woman with PMH dementia  admitted 3/10/25. She was noted at the SNF where resides to have cough, vomiting, and generalized weakness. In the ED note to have left facial droop and left-sided weakness. Telestroke was contacted and advised no tenecteplase. The left facial weakness and left -sided weakness resolved in the ED. She was found COVID-19 positive. She denies HA or other neurological complaints.    PMH: As above          HTN          HLD           GERD          Depression    SH: No allergy    Exam: Awake, alert, appropriate           Speech-no dysarthria           CN II-XII intact          Motor tone and strength   RUE  5/5  LUE   5/5                                                 RLE  5/5  LLE   5/5                            13.8   8.40  )-----------( 186      ( 11 Mar 2025 06:25 )             42.7         138  |  103  |  10  ----------------------------<  122[H]  4.0   |  25  |  0.79    Ca    9.3      11 Mar 2025 06:25    TPro  7.6  /  Alb  3.2[L]  /  TBili  0.5  /  DBili  0.1  /  AST  19  /  ALT  22  /  AlkPhos  83        Troponin I, High Sensitivity Result: 64.2: Serial measurements of high sensitivity troponin I (hs TnI) showing rapid  upward or downward changes suggest acute myocardial injury.  Please note  that a sustained elevation of hsTnI can be caused by renal disease,  chronic heart failure, sepsis, pulmonary embolism and other clinical  conditions. ng/L (25 @ 06:25)    < from: TTE Echo Complete w/o Contrast w/ Doppler (25 @ 10:53) >    CONCLUSIONS:     1. Technically difficult image quality.   2. Left ventricular cavity is normal in size. Left ventricular wall   thickness is mildly increased. Left ventricular systolic function is   normal with an ejection fraction visually estimated at 65 to 70 %.   3. Normal right ventricular cavity size and normal right ventricular   systolic function. Tricuspid annular plane systolic excursion (TAPSE) is   2.2 cm (normal >=1.7 cm). Tricuspid annular tissue Doppler S' is 10.0   cm/s (normal >10 cm/s).   4. Left atrium is top normal in size.   5. The right atrium is normal in size.   6. There is mild calcification of themitral valve annulus.   7. Mild mitral regurgitation.   8. Structurally normal tricuspid valve with normal leaflet excursion.   Pulmonary artery systolic pressure could not be estimated.   9. The aortic valve is not well visualized, appears to have   calcification with mild restricted opening.  10. Mild aortic regurgitation.  11. Aortic root at the sinuses of Valsalva is normal in size, measuring   2.22 cm (indexed 1.22 cm/m²).  12. The inferior vena cava is normal in size (normal <2.1cm) with normal   inspiratory collapse (normal >50%) consistent with normal right atrial   pressure (  3, range 0-5mmHg).  13. No pericardial effusion seen.      < from: CT Head No Cont (03.10.25 @ 20:27) >  INGS:    There are small chronic infarcts in the left cerebellum. There are   chronic lacunar infarcts in the basal ganglia. These are stable.    Moderate to severe generalized cerebral volume loss, with distention of   the sulci and concomitant ex-vacuo ventricular dilatation. Moderate to   severe nonspecific low attenuation in the periventricular and subcortical   white matter, likely due to small vessel disease.    No acute intracranial hemorrhage. No midline shift or herniation. No CT   evidence of acute territorial infarction, although MRI with DWI would be   more sensitive.    There is fluid in the sinuses, superimposed on mild chronic mucosal   opacities. There was a similar appearance on the prior exam. Correlate   clinically to exclude acute infection or an obstructive process. The   mastoids are clear.    Bilateral lens implants. Limited views of the orbits and visualized soft   tissues of the neck, face, scalp, skull base, and calvarium are otherwise   unremarkable.    IMPRESSION:    1.  Chronic infarction and senescent cerebral changes, without acute   intracranial hemorrhage.  2.  Fluid in the sinuses.        Patient Name: GUNNER STEINBERG  MRN: [id number], : 36      JULIANNA HOWE MD; Attending Radiologist  This document has been electronically signed. Mar 10 2025  8:49PM        < from: CT Angio Head w/ IV Cont (03.10.25 @ 20:43) >      FINDINGS:    There is calcified plaque and vessel kinking in the proximal right   subclavian artery with 30-40% stenosis, unchanged. There is calcified   plaque with less than 10% narrowing at the great vessel origins.    On the right, there is calcified plaque at the ICA origin with 25-35%   stenosis. This has worsened slightly. On the left, there is stable less   than 10% narrowing at the ICA origin. There is stable mild osteophytic   encroachment on the left vertebral artery.    Otherwise, the right and left ICAs, CCAs, vertebrals, subclavians, and   the brachiocephalic artery are negative. No ulcerated plaque or arterial   dissection.    A right C4 sidewall aneurysm measures 5.7 x 3.6 x 6.5 mm, and has not   progressed significantly. This lesion is below the level of the   ophthalmic artery origin, consistent with an extradural aneurysm.    A moderate to severe M2 segment left MCA stenosis. There is moderate to   severe focal stenosis in the right P2 segment. There is fetal origin of   the right PCA, with a long segment of moderate stenosis in the P2   segment. There is mild to moderate narrowing in the M1 segment of the   right MCA. All of these findings are chronic and stable.    No large vessel occlusions. No large feeding arteries or draining veins.   The ACAs, MCAs, PCAs, and carotid siphons are otherwise negative. The   basilar artery and V4 vertebral segments are otherwise negative.   Abnormalities of  vessels and distal intracranial branches are   beyond the resolution of this technique, and catheter angiography would   be more sensitive.    The dural venous sinuses are grossly patent, although this examination   wasn't optimized to evaluate the cerebral veins.    Degenerative changes in the cervical spine, with osteophytic foraminal   encroachment. No gross evidence of an acute fracture, although this   examination wasn't optimized to evaluate the spine.    A multinodular thyroid gland is noted, with an exophytic substernal mass   measuring 3.0 x 2.2 x 3.0 cm. This has not progressed. Further   characterization with an ultrasound is recommended to guide further   management.    IMPRESSION:    1.  No significant change.  2.  Moderate to severe left MCAand right PCA stenoses.  3.  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  4.  Mild narrowing of the right ICA and subclavian arteries, less than   40%.  5.  Multinodular thyroid gland.        Patient Name: GUNNER STEINBERG  MRN: [id number], : 38          JULIANNA HOWE MD; Attending Radiologist  This document has been electronically signed. Mar 10 2025  9:21PM

## 2025-03-11 NOTE — DIETITIAN INITIAL EVALUATION ADULT - PERTINENT MEDS FT
MEDICATIONS  (STANDING):  aspirin  chewable 81 milliGRAM(s) Oral daily  atorvastatin 80 milliGRAM(s) Oral at bedtime  calcium carbonate 1250 mG  + Vitamin D (OsCal 500 + D) 1 Tablet(s) Oral daily  dexAMETHasone     Tablet 6 milliGRAM(s) Oral daily  enoxaparin Injectable 40 milliGRAM(s) SubCutaneous every 24 hours  famotidine    Tablet 20 milliGRAM(s) Oral daily  losartan 100 milliGRAM(s) Oral daily  metoprolol tartrate 25 milliGRAM(s) Oral two times a day  polyethylene glycol 3350 17 Gram(s) Oral daily  remdesivir  IVPB   IV Intermittent   risperiDONE   Tablet 0.25 milliGRAM(s) Oral at bedtime  senna 2 Tablet(s) Oral at bedtime  traZODone 100 milliGRAM(s) Oral at bedtime    MEDICATIONS  (PRN):  acetaminophen     Tablet .. 650 milliGRAM(s) Oral every 6 hours PRN Temp greater or equal to 38C (100.4F), Mild Pain (1 - 3)  aluminum hydroxide/magnesium hydroxide/simethicone Suspension 30 milliLiter(s) Oral every 4 hours PRN Dyspepsia  melatonin 3 milliGRAM(s) Oral at bedtime PRN Insomnia  ondansetron Injectable 4 milliGRAM(s) IV Push every 8 hours PRN Nausea and/or Vomiting

## 2025-03-11 NOTE — OCCUPATIONAL THERAPY INITIAL EVALUATION ADULT - PHYSICAL ASSIST/NONPHYSICAL ASSIST: TOILET, REHAB EVAL
increased time for processing, impaired motor planning/verbal cues/1 person + 1 person to manage equipment

## 2025-03-11 NOTE — ED ADULT NURSE NOTE - CODE STROKE ACTIVATED DT
MEDICATIONS  (STANDING):  cefepime   IVPB 2000 milliGRAM(s) IV Intermittent every 12 hours  chlorhexidine 0.12% Liquid 15 milliLiter(s) Oral Mucosa every 12 hours  chlorhexidine 2% Cloths 1 Application(s) Topical <User Schedule>  heparin   Injectable 5000 Unit(s) SubCutaneous every 8 hours  influenza   Vaccine 0.5 milliLiter(s) IntraMuscular once  insulin glargine Injectable (LANTUS) 30 Unit(s) SubCutaneous every morning  insulin lispro (ADMELOG) corrective regimen sliding scale   SubCutaneous every 6 hours  insulin lispro Injectable (ADMELOG) 12 Unit(s) SubCutaneous every 6 hours  metroNIDAZOLE  IVPB 500 milliGRAM(s) IV Intermittent every 8 hours  pantoprazole  Injectable 40 milliGRAM(s) IV Push daily  potassium phosphate / sodium phosphate Tablet (K-PHOS No. 2) 1 Tablet(s) Oral four times a day with meals  predniSONE   Tablet 40 milliGRAM(s) Oral daily  vancomycin  IVPB      vancomycin  IVPB 1250 milliGRAM(s) IV Intermittent every 12 hours    MEDICATIONS  (PRN):  ALBUTerol    90 MICROgram(s) HFA Inhaler 2 Puff(s) Inhalation every 6 hours PRN Shortness of Breath 10-Mar-2025 18:28

## 2025-03-11 NOTE — OCCUPATIONAL THERAPY INITIAL EVALUATION ADULT - GROSSLY INTACT, SENSORY
Anesthesia Transfer of Care Note    Patient: Sid Richards    Procedure(s) Performed: Procedure(s) (LRB):  INCISION AND DRAINAGE, HAND cysto tubing (Right)    Patient location: PACU    Anesthesia Type: MAC    Transport from OR: Transported from OR on room air with adequate spontaneous ventilation    Post pain: adequate analgesia    Post assessment: no apparent anesthetic complications    Post vital signs: stable    Level of consciousness: awake    Nausea/Vomiting: no nausea/vomiting    Complications: none    Transfer of care protocol was followed      Last vitals: Visit Vitals  /65 (BP Location: Left arm, Patient Position: Lying)   Pulse 68   Temp 36.3 °C (97.4 °F) (Tympanic)   Resp 20   Ht 6' (1.829 m)   Wt 105.4 kg (232 lb 5.8 oz)   SpO2 96%   BMI 31.51 kg/m²      Left UE/Right UE/Left LE/Right LE/Grossly Intact

## 2025-03-11 NOTE — OCCUPATIONAL THERAPY INITIAL EVALUATION ADULT - GENERAL OBSERVATIONS, REHAB EVAL
Pt received semi-supine in bed, 2L O2 via NC with VSS per tele, A&Ox2 +confused, agreeable to OT IE without complaints. PT and RN present. Pt tolerated session well, performing toilet transfer via ambulatory approach + min Ax1, assist of another to manage equipment/lines, and increased time/cues due to decreased motor planning. Pt left seated on toilet with increased time needed for BM, direct handoff to RN present with pt, all lines intact, VSS.

## 2025-03-11 NOTE — OCCUPATIONAL THERAPY INITIAL EVALUATION ADULT - NSACTIVITYREC_GEN_A_OT
The patient would benefit from Home OT services at [facility] to improve pt endurance, strength, balance, for enhanced safety and engagement in ADLs & transfers.   Recommending assist x1 + 1 to manage equipment for transfers out of bed. Use of bedside commode.

## 2025-03-11 NOTE — PATIENT PROFILE ADULT - HOME ACCESSIBILITY CONCERNS
Pham Weems M.D.  Madalyn Dominguez, Med Ass't             Please fax patient's most recent CMP to her nephrologist Dr. Ortiz, and document.      Faxed labs to dr Ortiz, scanned conf in media  
none

## 2025-03-11 NOTE — PHYSICAL THERAPY INITIAL EVALUATION ADULT - PERTINENT HX OF CURRENT PROBLEM, REHAB EVAL
83y/o F from [facility] PMHx of dementia, HTN, HLD, h/o DVT was BIBEMS to ED for Generalized weakness, cough and vomiting upon waking up this morning. Pt admitted for further work up.

## 2025-03-11 NOTE — PHYSICAL THERAPY INITIAL EVALUATION ADULT - GAIT DEVIATIONS NOTED, PT EVAL
decreased dionne/increased time in double stance/decreased velocity of limb motion/decreased step length

## 2025-03-11 NOTE — DIETITIAN INITIAL EVALUATION ADULT - ORAL INTAKE PTA/DIET HISTORY
Patient reports having a fair appetite prior to admission. Reports that she is allergic to fresh peppers. Patient reports she cant tolerate beans and does not like fish. Patient does not follow any therapeutic meal patterns.

## 2025-03-11 NOTE — OCCUPATIONAL THERAPY INITIAL EVALUATION ADULT - PERTINENT HX OF CURRENT PROBLEM, REHAB EVAL
83y/o F from [facility] PMHx of dementia, HTN, HLD, h/o DVT was BIBEMS to ED for Generalized weakness, cough and vomiting upon waking up this morning. Pt admitted for further work up.  #generalized weakness  #Acute hypoxic respiratory failure 2/2 Covid

## 2025-03-11 NOTE — CONSULT NOTE ADULT - ASSESSMENT
Patient is an 84 year old woman with PMH dementia  admitted 3/10/25. She was noted at the SNF where resides to have cough, vomiting, and generalized weakness. In the ED note to have left facial droop and left-sided weakness. Telestroke was contacted and advised no tenecteplase. The left facial weakness and left -sided weakness resolved in the ED. She was found COVID-19 positive. She denies HA or other neurological complaints. Neurological exam as above.    1) CT Head as above no acute intracranial hemorrhage. Small chronic infarcts left cerebellum. Chronic lacunar infarcts basal ganglia. Fluid in the sinuses on chronic mucosal opacities. Correlate clinically for acute infection or obstructive process. Mastoids clear  2) CTA Head as above moderate to severe left MCA and right PCA stenoses  3) CTA Neck as above mild narrowing right ICA less than 40 %. Mild narrowing subclavian arteries. Extradural sidewall aneurysm right C4 ICA segment. Miltinodular thyroid  4) Echocardiogram as above no thrombus  LDL 72    With regard to the transient left facial and left-sided weakness would be concerned with TIA secondary to small vessel disease.    1) ASA 81 mg and Plavix 75 mg daily for 3 weeks After 3 weeks ASA 81 mg daily alone and discontinue Plavix  2) LDL preferred less than 70  3) As per Medicine with regard to findings of multinodular thyroid on CTA Neck  4) Outpatient Vascular Neurology follow-up extradural sidewall aneurysm right C4 ICA segment.

## 2025-03-11 NOTE — PROGRESS NOTE ADULT - ASSESSMENT
83y/o F from [facility] PMHx of dementia, HTN, HLD, h/o DVT was BIBEMS to ED for Generalized weakness, cough and vomiting upon waking up this morning. Pt admitted for further work up.    #Acute hypoxic respiratory failure 2/2 Covid  -O2 supplementation, titrate down as tolerated  -continuous O2 monitoring  -not on home oxygen  -c/w remdesivir and decadron; will titrate off of oxygen as tolerates  -PT/OT consults    #Presentation with facial droop, r/o CVA or TIA  - Symptoms have resolved  - Will consult dr Yanez  - C/w stroke protocol, neuro checks  - C/w ASA, statin, DVT PPX  - C/w remote telemetry  - f/u Echo  - f/u a1c, lipid panel     #mild elevated trops  -likely demand   - Will monitor on telemetry, TTE, c/w ASA, statin    #HTN  -c/w home losartan, metoprolol  -monitor BP    #Dementia  -c/w trazodone and risperidone    DVT ppx: lovenox  Am labs  FULL CODE  DISP From [facility], pending course, PT shanial  Pt and son were updated regarding plan of care.

## 2025-03-11 NOTE — DIETITIAN INITIAL EVALUATION ADULT - PERTINENT LABORATORY DATA
03-11    138  |  103  |  10  ----------------------------<  122[H]  4.0   |  25  |  0.79    Ca    9.3      11 Mar 2025 06:25    TPro  7.6  /  Alb  3.2[L]  /  TBili  0.5  /  DBili  0.1  /  AST  19  /  ALT  22  /  AlkPhos  83  03-11  POCT Blood Glucose.: 144 mg/dL (03-10-25 @ 18:38)

## 2025-03-12 LAB
ALBUMIN SERPL ELPH-MCNC: 3 G/DL — LOW (ref 3.3–5)
ALP SERPL-CCNC: 70 U/L — SIGNIFICANT CHANGE UP (ref 40–120)
ALT FLD-CCNC: 27 U/L — SIGNIFICANT CHANGE UP (ref 10–45)
AST SERPL-CCNC: 24 U/L — SIGNIFICANT CHANGE UP (ref 10–40)
BILIRUB DIRECT SERPL-MCNC: 0.1 MG/DL — SIGNIFICANT CHANGE UP (ref 0–0.3)
BILIRUB INDIRECT FLD-MCNC: 0.3 MG/DL — SIGNIFICANT CHANGE UP (ref 0.2–1)
BILIRUB SERPL-MCNC: 0.4 MG/DL — SIGNIFICANT CHANGE UP (ref 0.2–1.2)
CREAT SERPL-MCNC: 0.71 MG/DL — SIGNIFICANT CHANGE UP (ref 0.5–1.3)
EGFR: 82 ML/MIN/1.73M2 — SIGNIFICANT CHANGE UP
EGFR: 82 ML/MIN/1.73M2 — SIGNIFICANT CHANGE UP
INR BLD: 1.04 RATIO — SIGNIFICANT CHANGE UP (ref 0.85–1.16)
PROT SERPL-MCNC: 7.1 G/DL — SIGNIFICANT CHANGE UP (ref 6–8.3)
PROTHROM AB SERPL-ACNC: 12.3 SEC — SIGNIFICANT CHANGE UP (ref 9.9–13.4)

## 2025-03-12 PROCEDURE — 99232 SBSQ HOSP IP/OBS MODERATE 35: CPT

## 2025-03-12 RX ADMIN — DEXAMETHASONE 6 MILLIGRAM(S): 0.5 TABLET ORAL at 05:40

## 2025-03-12 RX ADMIN — Medication 100 MILLIGRAM(S): at 22:46

## 2025-03-12 RX ADMIN — Medication 2 TABLET(S): at 22:46

## 2025-03-12 RX ADMIN — ENOXAPARIN SODIUM 40 MILLIGRAM(S): 100 INJECTION SUBCUTANEOUS at 05:39

## 2025-03-12 RX ADMIN — Medication 81 MILLIGRAM(S): at 12:32

## 2025-03-12 RX ADMIN — ATORVASTATIN CALCIUM 80 MILLIGRAM(S): 80 TABLET, FILM COATED ORAL at 22:45

## 2025-03-12 RX ADMIN — Medication 20 MILLIGRAM(S): at 12:34

## 2025-03-12 RX ADMIN — CLOPIDOGREL BISULFATE 75 MILLIGRAM(S): 75 TABLET, FILM COATED ORAL at 12:34

## 2025-03-12 RX ADMIN — METOPROLOL SUCCINATE 25 MILLIGRAM(S): 50 TABLET, EXTENDED RELEASE ORAL at 17:19

## 2025-03-12 RX ADMIN — LOSARTAN POTASSIUM 100 MILLIGRAM(S): 100 TABLET, FILM COATED ORAL at 05:40

## 2025-03-12 RX ADMIN — METOPROLOL SUCCINATE 25 MILLIGRAM(S): 50 TABLET, EXTENDED RELEASE ORAL at 05:39

## 2025-03-12 RX ADMIN — Medication 0.25 MILLIGRAM(S): at 22:46

## 2025-03-12 RX ADMIN — Medication 1 TABLET(S): at 12:33

## 2025-03-12 RX ADMIN — REMDESIVIR 200 MILLIGRAM(S): 5 INJECTION INTRAVENOUS at 05:39

## 2025-03-12 NOTE — PROGRESS NOTE ADULT - TIME BILLING
- Ordering, reviewing, and interpreting labs, testing, and imaging.  - Independently obtaining a review of systems and performing a physical exam  - Reviewing consultant documentation/recommendations.  - Counselling and educating patient regarding interpretation of aforementioned items and plan of care.
Time spent includes direct patient care  (interview and examination of patient), discussion with other providers, support staff and/or patient's family members, review of medical records, ordering diagnostic tests and analyzing results, and documentation.

## 2025-03-12 NOTE — PROGRESS NOTE ADULT - ASSESSMENT
83y/o F from [facility] PMHx of dementia, HTN, HLD, h/o DVT was BIBEMS to ED for Generalized weakness, cough and vomiting upon waking up this morning. Pt admitted for further work up.    #Acute hypoxic respiratory failure 2/2 Covid  -O2 supplementation, titrate down as tolerated  -continuous O2 monitoring  -not on home oxygen  -c/w remdesivir and decadron; will titrate off of oxygen as tolerates  -PT/OT consults    #Presentation with facial droop, r/o CVA or TIA  - Symptoms have resolved  - Dr. Yanez appreciated  - C/w ASA, statin, DVT PPX  - C/w remote telemetry  - ECHO, A1c and lipid profile reviewed  -   #mild elevated trops  -likely demand   - Will monitor on telemetry, TTE, c/w ASA, statin    #HTN  -c/w home losartan, metoprolol  -monitor BP    #Dementia  -c/w trazodone and risperidone    DVT ppx: lovenox  Am labs  FULL CODE  DISP From [facility], PT recommends home with PT, anticipate ~ 24 hours to wean off oxygen requirements  Pt and son were updated regarding plan of care.  83y/o F from [facility] PMHx of dementia, HTN, HLD, h/o DVT was BIBEMS to ED for Generalized weakness, cough and vomiting upon waking up this morning. Pt admitted for further work up.    #Acute hypoxic respiratory failure 2/2 Covid  -O2 supplementation, titrate down as tolerated  -continuous O2 monitoring  -not on home oxygen  -c/w remdesivir and decadron; will titrate off of oxygen as tolerates  -PT/OT consults    #Presentation with facial droop, r/o CVA or TIA  - Symptoms have resolved  - Dr. Yanez appreciated  - C/w ASA, statin, DVT PPX  -   #mild elevated trops  -likely demand   - Will monitor on telemetry, TTE, c/w ASA, statin    #HTN  -c/w home losartan, metoprolol  -monitor BP    #Dementia  -c/w trazodone and risperidone    DVT ppx: lovenox  Am labs  FULL CODE  DISP From [facility], PT recommends home with PT, anticipate ~ 24 hours to wean off oxygen requirements

## 2025-03-13 ENCOUNTER — TRANSCRIPTION ENCOUNTER (OUTPATIENT)
Age: 85
End: 2025-03-13

## 2025-03-13 LAB
ALBUMIN SERPL ELPH-MCNC: 3.3 G/DL — SIGNIFICANT CHANGE UP (ref 3.3–5)
ALP SERPL-CCNC: 78 U/L — SIGNIFICANT CHANGE UP (ref 40–120)
ALT FLD-CCNC: 27 U/L — SIGNIFICANT CHANGE UP (ref 10–45)
AST SERPL-CCNC: 29 U/L — SIGNIFICANT CHANGE UP (ref 10–40)
BILIRUB DIRECT SERPL-MCNC: 0.1 MG/DL — SIGNIFICANT CHANGE UP (ref 0–0.3)
BILIRUB INDIRECT FLD-MCNC: 0.4 MG/DL — SIGNIFICANT CHANGE UP (ref 0.2–1)
BILIRUB SERPL-MCNC: 0.5 MG/DL — SIGNIFICANT CHANGE UP (ref 0.2–1.2)
CREAT SERPL-MCNC: 0.66 MG/DL — SIGNIFICANT CHANGE UP (ref 0.5–1.3)
EGFR: 85 ML/MIN/1.73M2 — SIGNIFICANT CHANGE UP
EGFR: 85 ML/MIN/1.73M2 — SIGNIFICANT CHANGE UP
INR BLD: 1.04 RATIO — SIGNIFICANT CHANGE UP (ref 0.85–1.16)
PROT SERPL-MCNC: 7.9 G/DL — SIGNIFICANT CHANGE UP (ref 6–8.3)
PROTHROM AB SERPL-ACNC: 12.3 SEC — SIGNIFICANT CHANGE UP (ref 9.9–13.4)

## 2025-03-13 PROCEDURE — 99232 SBSQ HOSP IP/OBS MODERATE 35: CPT | Mod: FS

## 2025-03-13 RX ADMIN — Medication 81 MILLIGRAM(S): at 12:49

## 2025-03-13 RX ADMIN — Medication 2 TABLET(S): at 21:48

## 2025-03-13 RX ADMIN — ATORVASTATIN CALCIUM 80 MILLIGRAM(S): 80 TABLET, FILM COATED ORAL at 21:48

## 2025-03-13 RX ADMIN — Medication 1 TABLET(S): at 12:49

## 2025-03-13 RX ADMIN — Medication 20 MILLIGRAM(S): at 12:50

## 2025-03-13 RX ADMIN — REMDESIVIR 200 MILLIGRAM(S): 5 INJECTION INTRAVENOUS at 05:50

## 2025-03-13 RX ADMIN — DEXAMETHASONE 6 MILLIGRAM(S): 0.5 TABLET ORAL at 05:48

## 2025-03-13 RX ADMIN — METOPROLOL SUCCINATE 25 MILLIGRAM(S): 50 TABLET, EXTENDED RELEASE ORAL at 05:48

## 2025-03-13 RX ADMIN — LOSARTAN POTASSIUM 100 MILLIGRAM(S): 100 TABLET, FILM COATED ORAL at 05:50

## 2025-03-13 RX ADMIN — CLOPIDOGREL BISULFATE 75 MILLIGRAM(S): 75 TABLET, FILM COATED ORAL at 12:50

## 2025-03-13 RX ADMIN — Medication 100 MILLIGRAM(S): at 21:48

## 2025-03-13 RX ADMIN — Medication 0.25 MILLIGRAM(S): at 21:48

## 2025-03-13 RX ADMIN — POLYETHYLENE GLYCOL 3350 17 GRAM(S): 17 POWDER, FOR SOLUTION ORAL at 12:47

## 2025-03-13 RX ADMIN — ENOXAPARIN SODIUM 40 MILLIGRAM(S): 100 INJECTION SUBCUTANEOUS at 05:51

## 2025-03-13 RX ADMIN — METOPROLOL SUCCINATE 25 MILLIGRAM(S): 50 TABLET, EXTENDED RELEASE ORAL at 18:01

## 2025-03-13 NOTE — DISCHARGE NOTE PROVIDER - HOSPITAL COURSE
Hospital Course  83y/o F from [facility] PMHx of dementia, HTN, HLD, h/o DVT was BIBEMS to ED for Generalized weakness, cough and vomiting upon waking up this morning. Pt had code stroke called in triage for left facial droop and weakness, and code stroke was cancelled upon review of patient as symptoms were more than 24hrs and symptoms had resolved. NIHSS 0. Pt's son, Fabiano, provided the history. Pt reports feeling sleepy and right eye blindness. Son noted Pt is less energetic since Sunday. Denies fever, chills, chest pain, abdominal pain, or dysuria.    In the ED, VS T 98.2F, , /94, RR 18, SpO2 95% on RA. Labs pertinent for +Covid, Trop 60.3. CXR unremarkable, CT Head showed chronic infarct, CTA Head and neck as below. CT a/p large stool burden. Pt was given 500ml IVF bolus, and zofran 4mg x2.    CT Angio Neck w/ IV Cont (03.10.25 @ 20:44)   IMPRESSION:    1.  No significant change.  2.  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  3.  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  4.  Mild narrowing of the right ICA and subclavian arteries, less than 40%.  5.  Multinodular thyroid gland.    Patient admitted to the hospital on 3/10 with cough and vomiting found to be hypoxic and covid positive.  Managed with supplemental oxygen, course of remdesivir and decadron.  Course c/b concern for facial droop possible TIA, symptoms resolved, CTH showed chronic infarction and senescent cerebral changes without bleed. Angio head and neck showed moderate to severe left MCA and right PCA stenoses, extradural sidewall aneurysm right ICA segment.   Neuro appreciated    Source of Infection:  Antibiotic / Last Day:    Palliative Care / Advanced Care Planning  Code Status:  Patient/Family agreeable to Hospice/Palliative (Y/N)?  Summary of Goals of Care Conversation:    Discharging Provider:  Yasmani Jarvis NP  Contact Info: Cell [phone] - Please call with any questions or concerns.    Outpatient Provider:     Signout given to  SNF Provider:       Hospital Course  83y/o F from [facility] PMHx of dementia, HTN, HLD, h/o DVT was BIBEMS to ED for Generalized weakness, cough and vomiting upon waking up this morning. Pt had code stroke called in triage for left facial droop and weakness, and code stroke was cancelled upon review of patient as symptoms were more than 24hrs and symptoms had resolved. NIHSS 0. Pt's son, Fabiano, provided the history. Pt reports feeling sleepy and right eye blindness. Son noted Pt is less energetic since Sunday. Denies fever, chills, chest pain, abdominal pain, or dysuria.    In the ED, VS T 98.2F, , /94, RR 18, SpO2 95% on RA. Labs pertinent for +Covid, Trop 60.3. CXR unremarkable, CT Head showed chronic infarct, CTA Head and neck as below. CT a/p large stool burden. Pt was given 500ml IVF bolus, and zofran 4mg x2.    CT Angio Neck w/ IV Cont (03.10.25 @ 20:44)   IMPRESSION:    1.  No significant change.  2.  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  3.  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  4.  Mild narrowing of the right ICA and subclavian arteries, less than 40%.  5.  Multinodular thyroid gland.    Patient admitted to the hospital on 3/10 with cough and vomiting found to be hypoxic and covid positive.  Managed with supplemental oxygen, course of remdesivir and decadron.  Course c/b concern for facial droop possible TIA, symptoms resolved, CTH showed chronic infarction and senescent cerebral changes without bleed. Angio head and neck showed moderate to severe left MCA and right PCA stenoses, extradural sidewall aneurysm right ICA segment.   Neuro appreciated, recommended DAPT (plavix for three weeks), followed by ASA monotherapy.  Started high intensity statin therapy, lipid profile reviewed.  Recommend outpatient vascular neurology follow up regarding extradural sidewall aneurysm right ICA segment.  Patient hypoxia slowly improved, weaned off oxygen requirements ...    Source of Infection:  Antibiotic / Last Day:    Palliative Care / Advanced Care Planning  Code Status:  Patient/Family agreeable to Hospice/Palliative (Y/N)?  Summary of Goals of Care Conversation:    Discharging Provider:  Yasmani Jarvis NP  Contact Info: Cell [phone] - Please call with any questions or concerns.    Outpatient Provider: Dr. Peres           Hospital Course  83y/o F from [facility] PMHx of dementia, HTN, HLD, h/o DVT was BIBEMS to ED for Generalized weakness, cough and vomiting upon waking up this morning. Pt had code stroke called in triage for left facial droop and weakness, and code stroke was cancelled upon review of patient as symptoms were more than 24hrs and symptoms had resolved. NIHSS 0. Pt's son, Fabiano, provided the history. Pt reports feeling sleepy and right eye blindness. Son noted Pt is less energetic since Sunday. Denies fever, chills, chest pain, abdominal pain, or dysuria.    In the ED, VS T 98.2F, , /94, RR 18, SpO2 95% on RA. Labs pertinent for +Covid, Trop 60.3. CXR unremarkable, CT Head showed chronic infarct, CTA Head and neck as below. CT a/p large stool burden. Pt was given 500ml IVF bolus, and zofran 4mg x2.    CT Angio Neck w/ IV Cont (03.10.25 @ 20:44)   IMPRESSION:    1.  No significant change.  2.  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  3.  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  4.  Mild narrowing of the right ICA and subclavian arteries, less than 40%.  5.  Multinodular thyroid gland.    Patient admitted to the hospital on 3/10 with cough and vomiting found to be hypoxic and covid positive.  Managed with supplemental oxygen, course of remdesivir and decadron.  Course c/b concern for facial droop possible TIA, symptoms resolved, CTH showed chronic infarction and senescent cerebral changes without bleed. Angio head and neck showed moderate to severe left MCA and right PCA stenoses, extradural sidewall aneurysm right ICA segment.   Neuro appreciated, recommended DAPT (plavix for three weeks), followed by ASA monotherapy.  Started high intensity statin therapy, lipid profile reviewed.  Recommend outpatient vascular neurology follow up regarding extradural sidewall aneurysm right ICA segment.  Patient hypoxia slowly improved, weaned off oxygen requirements. Deemed stable for discharge     Discharging Provider: Jonathan Andrade MD  Contact Info: Cell [phone]- Please call with any questions or concerns.    Outpatient Provider: Dr. Peres           Hospital Course  85y/o F from [facility] PMHx of dementia, HTN, HLD, h/o DVT was BIBEMS to ED for Generalized weakness, cough and vomiting upon waking up this morning. Pt had code stroke called in triage for left facial droop and weakness, and code stroke was cancelled upon review of patient as symptoms were more than 24hrs and symptoms had resolved. NIHSS 0. Pt's son, Fabiano, provided the history. Pt reports feeling sleepy and right eye blindness. Son noted Pt is less energetic since Sunday. Denies fever, chills, chest pain, abdominal pain, or dysuria.    In the ED, VS T 98.2F, , /94, RR 18, SpO2 95% on RA. Labs pertinent for +Covid, Trop 60.3. CXR unremarkable, CT Head showed chronic infarct, CTA Head and neck as below. CT a/p large stool burden. Pt was given 500ml IVF bolus, and zofran 4mg x2.    CT Angio Neck w/ IV Cont (03.10.25 @ 20:44)   IMPRESSION:    1.  No significant change.  2.  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  3.  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  4.  Mild narrowing of the right ICA and subclavian arteries, less than 40%.  5.  Multinodular thyroid gland.    Patient admitted to the hospital on 3/10 with cough and vomiting found to be hypoxic and covid positive.  Managed with supplemental oxygen, course of remdesivir and decadron.  Course c/b concern for facial droop possible TIA, symptoms resolved, CTH showed chronic infarction and senescent cerebral changes without bleed. Angio head and neck showed moderate to severe left MCA and right PCA stenoses, extradural sidewall aneurysm right ICA segment.   Neuro appreciated, recommended DAPT (plavix for three weeks), followed by ASA monotherapy.  Monitored on Tele, no findings of A Fib on cardiac monitor. No indication for AC. Started high intensity statin therapy, lipid profile reviewed.  Recommend outpatient vascular neurology follow up regarding extradural sidewall aneurysm right ICA segment.  Patient hypoxia slowly improved, weaned off oxygen requirements. Deemed stable for discharge     Discharging Provider: Jonathan Andrade MD  Contact Info: Cell [phone]- Please call with any questions or concerns.    Outpatient Provider: Dr. Peres

## 2025-03-13 NOTE — DISCHARGE NOTE PROVIDER - NSDCMRMEDTOKEN_GEN_ALL_CORE_FT
acetaminophen: 650 milligram(s) orally 3 times a day as needed for  mild pain  Calcium 600+D 600 mg-5 mcg (200 intl units) oral tablet: orally 2 times a day  cyanocobalamin 1000 mcg/mL injectable solution: 1000 microgram(s) injectable every 4 weeks  famotidine 20 mg oral tablet: orally once a day  ipratropium-albuterol 0.5 mg-2.5 mg/3 mL inhalation solution: 3 milliliter(s) by nebulizer 2 times a day as needed for  shortness of breath and/or wheezing  losartan 100 mg oral tablet: 1 tab(s) orally once a day  melatonin 10 mg oral tablet: 1 tab(s) orally once a day (at bedtime)  metoprolol tartrate 25 mg oral tablet: 1 tab(s) orally 2 times a day  risperiDONE 0.25 mg oral tablet: 1 tab(s) orally once a day (at bedtime)  rosuvastatin 5 mg oral tablet: 1 tab(s) orally once a day  traZODone 100 mg oral tablet: 1 tab(s) orally once a day (at bedtime)   aspirin 81 mg oral tablet, chewable: 1 tab(s) orally once a day  atorvastatin 80 mg oral tablet: 1 tab(s) orally once a day (at bedtime)  Calcium 600+D 600 mg-5 mcg (200 intl units) oral tablet: orally 2 times a day  clopidogrel 75 mg oral tablet: 1 tab(s) orally once a day  cyanocobalamin 1000 mcg/mL injectable solution: 1000 microgram(s) injectable every 4 weeks  dexAMETHasone 6 mg oral tablet: 1 tab(s) orally once a day  famotidine 20 mg oral tablet: orally once a day  losartan 100 mg oral tablet: 1 tab(s) orally once a day  melatonin 10 mg oral tablet: 1 tab(s) orally once a day (at bedtime)  metoprolol tartrate 25 mg oral tablet: 1 tab(s) orally 2 times a day  risperiDONE 0.25 mg oral tablet: 1 tab(s) orally once a day (at bedtime)  traZODone 100 mg oral tablet: 1 tab(s) orally once a day (at bedtime)

## 2025-03-13 NOTE — PROGRESS NOTE ADULT - ASSESSMENT
83y/o F from [facility] PMHx of dementia, HTN, HLD, h/o DVT was BIBEMS to ED for Generalized weakness, cough and vomiting upon waking up this morning. Pt admitted for further work up.    #Acute hypoxic respiratory failure 2/2 Covid  -O2 supplementation, titrate down as tolerated  -continuous O2 monitoring  -not on home oxygen  -completed remdesivir, cont decadron; will titrate off of oxygen as tolerates  -PT/OT consults    #Presentation with facial droop, r/o CVA or TIA  - Symptoms have resolved  - Dr. Yanez appreciated  - C/w ASA, statin, DVT PPX  -   #mild elevated trops  -likely demand   - Will monitor on telemetry, TTE, c/w ASA, statin    #HTN  -c/w home losartan, metoprolol  -monitor BP    #Dementia  -c/w trazodone and risperidone    DVT ppx: lovenox  Am labs  FULL CODE  DISP From [facility], PT recommends home with PT, anticipate discharge when weaned off oxygen requirements with stable respiratory status 85y/o F from [facility] PMHx of dementia, HTN, HLD, h/o DVT was BIBEMS to ED for Generalized weakness, cough and vomiting upon waking up this morning. Pt admitted for further work up.    #Acute hypoxic respiratory failure 2/2 Covid  -O2 supplementation, titrate down as tolerated  -continuous O2 monitoring  -not on home oxygen  -completed remdesivir, cont decadron; will titrate off of oxygen as tolerates  -PT/OT consults    #Presentation with facial droop, r/o CVA or TIA  - Symptoms have resolved, pt is at baseline  - Dr. Yanez appreciated  - CTH showed chronic infarcts no bleeding, Angio head and neck showed moderate to severe left MCA and RT PCA stenoses, extradural sidewall aneurysm RT ICA segment  - ECHO without thrombus, lipid profile reviewed  - recommend DAPT for three weeks followed by ASA monotherapy, outpatient vascular neurology follow up for aneurysm  -   #mild elevated trops  -likely demand   - Will monitor on telemetry, TTE, c/w ASA, statin    #HTN  -c/w home losartan, metoprolol  -monitor BP    #Dementia  -c/w trazodone and risperidone    DVT ppx: lovenox  Am labs  FULL CODE  DISP From [facility], PT recommends home with PT, anticipate discharge when weaned off oxygen requirements with stable respiratory status

## 2025-03-13 NOTE — PROGRESS NOTE ADULT - NS ATTEND AMEND GEN_ALL_CORE FT
Patient seen and examined.     Covid - remdesivir and decadran. Wean off 02 as tolerated.     plan to dc home in 24 -48 hrs once off supplemental 02
Pt seen and examined at bedside.  No acute events overnight    Pt admitted w/ ARFH secondary to COVID-19 infection  S/p Remdesivir  Continue Decadron  Wean O2 as tolerated. Will need ambulatory O2 sats as well    Pt w/ Transient Left sided weakness  Neuro on board; DAPT x 3 weeks and then monotherapy w/ ASA    Anticipate discharge back to [facility]     Updated pt's son Georges

## 2025-03-13 NOTE — DISCHARGE NOTE PROVIDER - NSDCCPCAREPLAN_GEN_ALL_CORE_FT
PRINCIPAL DISCHARGE DIAGNOSIS  Diagnosis: 2019 novel coronavirus disease (COVID-19)  Assessment and Plan of Treatment: you presented with symptoms of shortness of breath due to COVID-19 infection, requiring additional oxygen. You were treated with Remdesivir as well as Steroids. You are taken off of oxygen and doing well.      SECONDARY DISCHARGE DIAGNOSES  Diagnosis: CVA (cerebrovascular accident)  Assessment and Plan of Treatment: while in the hospital you developed left sided weakness, facial asymmetry. There was a concern for CVA, you were seen by Neurology and recommended to be on Aspirin and plavix for 3 weeks and then only Aspirin once daily indefinitely.

## 2025-03-14 ENCOUNTER — TRANSCRIPTION ENCOUNTER (OUTPATIENT)
Age: 85
End: 2025-03-14

## 2025-03-14 VITALS
TEMPERATURE: 98 F | DIASTOLIC BLOOD PRESSURE: 89 MMHG | OXYGEN SATURATION: 96 % | SYSTOLIC BLOOD PRESSURE: 157 MMHG | RESPIRATION RATE: 18 BRPM | HEART RATE: 80 BPM

## 2025-03-14 LAB
ALBUMIN SERPL ELPH-MCNC: 3 G/DL — LOW (ref 3.3–5)
ALP SERPL-CCNC: 75 U/L — SIGNIFICANT CHANGE UP (ref 40–120)
ALT FLD-CCNC: 27 U/L — SIGNIFICANT CHANGE UP (ref 10–45)
AST SERPL-CCNC: 22 U/L — SIGNIFICANT CHANGE UP (ref 10–40)
BILIRUB DIRECT SERPL-MCNC: 0.1 MG/DL — SIGNIFICANT CHANGE UP (ref 0–0.3)
BILIRUB INDIRECT FLD-MCNC: 0.3 MG/DL — SIGNIFICANT CHANGE UP (ref 0.2–1)
BILIRUB SERPL-MCNC: 0.4 MG/DL — SIGNIFICANT CHANGE UP (ref 0.2–1.2)
CREAT SERPL-MCNC: 0.58 MG/DL — SIGNIFICANT CHANGE UP (ref 0.5–1.3)
EGFR: 88 ML/MIN/1.73M2 — SIGNIFICANT CHANGE UP
EGFR: 88 ML/MIN/1.73M2 — SIGNIFICANT CHANGE UP
INR BLD: 1.03 RATIO — SIGNIFICANT CHANGE UP (ref 0.85–1.16)
PROT SERPL-MCNC: 6.8 G/DL — SIGNIFICANT CHANGE UP (ref 6–8.3)
PROTHROM AB SERPL-ACNC: 12.1 SEC — SIGNIFICANT CHANGE UP (ref 9.9–13.4)

## 2025-03-14 PROCEDURE — 86850 RBC ANTIBODY SCREEN: CPT

## 2025-03-14 PROCEDURE — 80076 HEPATIC FUNCTION PANEL: CPT

## 2025-03-14 PROCEDURE — 84484 ASSAY OF TROPONIN QUANT: CPT

## 2025-03-14 PROCEDURE — 71045 X-RAY EXAM CHEST 1 VIEW: CPT

## 2025-03-14 PROCEDURE — 85027 COMPLETE CBC AUTOMATED: CPT

## 2025-03-14 PROCEDURE — 85025 COMPLETE CBC W/AUTO DIFF WBC: CPT

## 2025-03-14 PROCEDURE — 85610 PROTHROMBIN TIME: CPT

## 2025-03-14 PROCEDURE — 97166 OT EVAL MOD COMPLEX 45 MIN: CPT

## 2025-03-14 PROCEDURE — 83036 HEMOGLOBIN GLYCOSYLATED A1C: CPT

## 2025-03-14 PROCEDURE — 80061 LIPID PANEL: CPT

## 2025-03-14 PROCEDURE — 85379 FIBRIN DEGRADATION QUANT: CPT

## 2025-03-14 PROCEDURE — 74177 CT ABD & PELVIS W/CONTRAST: CPT | Mod: MC

## 2025-03-14 PROCEDURE — 86901 BLOOD TYPING SEROLOGIC RH(D): CPT

## 2025-03-14 PROCEDURE — 93005 ELECTROCARDIOGRAM TRACING: CPT

## 2025-03-14 PROCEDURE — 70450 CT HEAD/BRAIN W/O DYE: CPT | Mod: MC

## 2025-03-14 PROCEDURE — 99285 EMERGENCY DEPT VISIT HI MDM: CPT

## 2025-03-14 PROCEDURE — 82565 ASSAY OF CREATININE: CPT

## 2025-03-14 PROCEDURE — 87637 SARSCOV2&INF A&B&RSV AMP PRB: CPT

## 2025-03-14 PROCEDURE — 96374 THER/PROPH/DIAG INJ IV PUSH: CPT

## 2025-03-14 PROCEDURE — 97162 PT EVAL MOD COMPLEX 30 MIN: CPT

## 2025-03-14 PROCEDURE — 80053 COMPREHEN METABOLIC PANEL: CPT

## 2025-03-14 PROCEDURE — 96376 TX/PRO/DX INJ SAME DRUG ADON: CPT

## 2025-03-14 PROCEDURE — 82962 GLUCOSE BLOOD TEST: CPT

## 2025-03-14 PROCEDURE — 93306 TTE W/DOPPLER COMPLETE: CPT

## 2025-03-14 PROCEDURE — 70498 CT ANGIOGRAPHY NECK: CPT | Mod: MC

## 2025-03-14 PROCEDURE — 99239 HOSP IP/OBS DSCHRG MGMT >30: CPT

## 2025-03-14 PROCEDURE — 85730 THROMBOPLASTIN TIME PARTIAL: CPT

## 2025-03-14 PROCEDURE — 86900 BLOOD TYPING SEROLOGIC ABO: CPT

## 2025-03-14 PROCEDURE — 82550 ASSAY OF CK (CPK): CPT

## 2025-03-14 PROCEDURE — 36415 COLL VENOUS BLD VENIPUNCTURE: CPT

## 2025-03-14 PROCEDURE — 70496 CT ANGIOGRAPHY HEAD: CPT | Mod: MC

## 2025-03-14 RX ORDER — CLOPIDOGREL BISULFATE 75 MG/1
1 TABLET, FILM COATED ORAL
Qty: 21 | Refills: 0
Start: 2025-03-14 | End: 2025-04-03

## 2025-03-14 RX ORDER — DEXAMETHASONE 0.5 MG/1
1 TABLET ORAL
Qty: 6 | Refills: 0
Start: 2025-03-14 | End: 2025-03-19

## 2025-03-14 RX ORDER — ATORVASTATIN CALCIUM 80 MG/1
1 TABLET, FILM COATED ORAL
Qty: 30 | Refills: 0
Start: 2025-03-14 | End: 2025-04-12

## 2025-03-14 RX ORDER — ASPIRIN 325 MG
1 TABLET ORAL
Qty: 30 | Refills: 0
Start: 2025-03-14 | End: 2025-04-12

## 2025-03-14 RX ADMIN — POLYETHYLENE GLYCOL 3350 17 GRAM(S): 17 POWDER, FOR SOLUTION ORAL at 12:27

## 2025-03-14 RX ADMIN — Medication 1 TABLET(S): at 12:28

## 2025-03-14 RX ADMIN — METOPROLOL SUCCINATE 25 MILLIGRAM(S): 50 TABLET, EXTENDED RELEASE ORAL at 05:16

## 2025-03-14 RX ADMIN — DEXAMETHASONE 6 MILLIGRAM(S): 0.5 TABLET ORAL at 05:16

## 2025-03-14 RX ADMIN — CLOPIDOGREL BISULFATE 75 MILLIGRAM(S): 75 TABLET, FILM COATED ORAL at 12:28

## 2025-03-14 RX ADMIN — Medication 81 MILLIGRAM(S): at 12:28

## 2025-03-14 RX ADMIN — LOSARTAN POTASSIUM 100 MILLIGRAM(S): 100 TABLET, FILM COATED ORAL at 05:16

## 2025-03-14 RX ADMIN — Medication 20 MILLIGRAM(S): at 12:28

## 2025-03-14 RX ADMIN — ENOXAPARIN SODIUM 40 MILLIGRAM(S): 100 INJECTION SUBCUTANEOUS at 05:16

## 2025-03-14 NOTE — DISCHARGE NOTE NURSING/CASE MANAGEMENT/SOCIAL WORK - PATIENT PORTAL LINK FT
You can access the FollowMyHealth Patient Portal offered by [facility] by registering at the following website: [email or web address]. By joining [facility]’s FollowMyHealth portal, you will also be able to view your health information using other applications (apps) compatible with our system.

## 2025-03-14 NOTE — PROGRESS NOTE ADULT - REASON FOR ADMISSION
Generalized weakness, +Covid

## 2025-03-14 NOTE — PROGRESS NOTE ADULT - ASSESSMENT
85y/o F from [facility] PMHx of dementia, HTN, HLD, h/o DVT was BIBEMS to ED for Generalized weakness, cough and vomiting upon waking up this morning. Pt admitted for further work up.    #Acute hypoxic respiratory failure 2/2 Covid  -O2 supplementation, titrate down to RA  -completed remdesivir, cont decadron for a total of 10 days    #Presentation with facial droop, r/o CVA or TIA  - Symptoms have resolved, pt is at baseline  - Dr. Yanez appreciated  - CTH showed chronic infarcts no bleeding, Angio head and neck showed moderate to severe left MCA and RT PCA stenoses, extradural sidewall aneurysm RT ICA segment  - ECHO without thrombus, lipid profile reviewed  - recommend DAPT for three weeks followed by ASA monotherapy, outpatient vascular neurology follow up for aneurysm  -   #mild elevated trops  -likely demand   - Will monitor on telemetry, TTE, c/w ASA, statin    #HTN  -c/w home losartan, metoprolol  -monitor BP    #Dementia  -c/w trazodone and risperidone    DVT ppx: lovenox  Am labs  FULL CODE    Discharge back to [facility] today  Updated son Georges

## 2025-03-14 NOTE — PROGRESS NOTE ADULT - SUBJECTIVE AND OBJECTIVE BOX
Patient is a 84y old  Female who presents with a chief complaint of Generalized weakness, +Covid (11 Mar 2025 10:41)    Patient seen and examined at bedside.  no acute overnight events    ALLERGIES:  Fresh Pepper (Unknown)  No Known Allergies        Vital Signs Last 24 Hrs  T(F): 97.8 (12 Mar 2025 06:04), Max: 98.6 (11 Mar 2025 12:24)  HR: 86 (12 Mar 2025 06:04) (67 - 86)  BP: 170/86 (12 Mar 2025 06:04) (121/60 - 171/91)  RR: 18 (12 Mar 2025 06:04) (18 - 18)  SpO2: 95% (12 Mar 2025 06:04) (92% - 98%)  I&O's Summary    11 Mar 2025 07:01  -  12 Mar 2025 07:00  --------------------------------------------------------  IN: 360 mL / OUT: 800 mL / NET: -440 mL      MEDICATIONS:  acetaminophen     Tablet .. 650 milliGRAM(s) Oral every 6 hours PRN  aluminum hydroxide/magnesium hydroxide/simethicone Suspension 30 milliLiter(s) Oral every 4 hours PRN  aspirin  chewable 81 milliGRAM(s) Oral daily  atorvastatin 80 milliGRAM(s) Oral at bedtime  calcium carbonate 1250 mG  + Vitamin D (OsCal 500 + D) 1 Tablet(s) Oral daily  clopidogrel Tablet      clopidogrel Tablet 75 milliGRAM(s) Oral daily  dexAMETHasone     Tablet 6 milliGRAM(s) Oral daily  enoxaparin Injectable 40 milliGRAM(s) SubCutaneous every 24 hours  famotidine    Tablet 20 milliGRAM(s) Oral daily  losartan 100 milliGRAM(s) Oral daily  melatonin 3 milliGRAM(s) Oral at bedtime PRN  metoprolol tartrate 25 milliGRAM(s) Oral two times a day  ondansetron Injectable 4 milliGRAM(s) IV Push every 8 hours PRN  polyethylene glycol 3350 17 Gram(s) Oral daily  PPD  5 Tuberculin Unit(s) Injectable 5 Unit(s) IntraDermal once  remdesivir  IVPB 100 milliGRAM(s) IV Intermittent every 24 hours  remdesivir  IVPB   IV Intermittent   risperiDONE   Tablet 0.25 milliGRAM(s) Oral at bedtime  senna 2 Tablet(s) Oral at bedtime  traZODone 100 milliGRAM(s) Oral at bedtime      PHYSICAL EXAM:  General: NAD, A/O x 2  ENT: MMM, no thrush  Neck: Supple, No JVD  Lungs: Clear to auscultation bilaterally, non labored, fair inspiratory effort  Cardio: RRR, S1/S2, No murmurs  Abdomen: Soft, Nontender, Nondistended; Bowel sounds present  Extremities: No cyanosis, No edema    LABS:                        13.8   8.40  )-----------( 186      ( 11 Mar 2025 06:25 )             42.7     03-12    x   |  x   |  x   ----------------------------<  x   x    |  x   |  0.71    Ca    9.3      11 Mar 2025 06:25    TPro  7.1  /  Alb  3.0  /  TBili  0.4  /  DBili  0.1  /  AST  24  /  ALT  27  /  AlkPhos  70  03-12      PT/INR - ( 12 Mar 2025 07:03 )   PT: 12.3 sec;   INR: 1.04 ratio         PTT - ( 10 Mar 2025 19:25 )  PTT:40.3 sec    CARDIAC MARKERS ( 11 Mar 2025 06:25 )  x     / 64.2 ng/L / x     / x     / x      CARDIAC MARKERS ( 10 Mar 2025 21:43 )  x     / 60.2 ng/L / x     / x     / x      CARDIAC MARKERS ( 10 Mar 2025 19:25 )  x     / 60.3 ng/L / x     / x     / x          03-11 Chol 147 mg/dL LDL -- HDL 61 mg/dL Trig 69 mg/dL                  Urinalysis Basic - ( 11 Mar 2025 06:25 )    Color: x / Appearance: x / SG: x / pH: x  Gluc: 122 mg/dL / Ketone: x  / Bili: x / Urobili: x   Blood: x / Protein: x / Nitrite: x   Leuk Esterase: x / RBC: x / WBC x   Sq Epi: x / Non Sq Epi: x / Bacteria: x            RADIOLOGY & ADDITIONAL TESTS:    Care Discussed with Consultants/Other Providers:   
Patient is a 84y old  Female who presents with a chief complaint of Generalized weakness, +Covid (13 Mar 2025 13:27)      SUBJECTIVE / OVERNIGHT EVENTS:  Pt seen and examined at bedside. No acute events overnight.  Pt denies cp, palpitations, sob, abd pain, N/V, fever, chills.    ROS:  All other review of systems negative    Allergies    No Known Allergies    Intolerances    Fresh Pepper (Unknown)      MEDICATIONS  (STANDING):  aspirin  chewable 81 milliGRAM(s) Oral daily  atorvastatin 80 milliGRAM(s) Oral at bedtime  calcium carbonate 1250 mG  + Vitamin D (OsCal 500 + D) 1 Tablet(s) Oral daily  clopidogrel Tablet      clopidogrel Tablet 75 milliGRAM(s) Oral daily  dexAMETHasone     Tablet 6 milliGRAM(s) Oral daily  enoxaparin Injectable 40 milliGRAM(s) SubCutaneous every 24 hours  famotidine    Tablet 20 milliGRAM(s) Oral daily  losartan 100 milliGRAM(s) Oral daily  metoprolol tartrate 25 milliGRAM(s) Oral two times a day  polyethylene glycol 3350 17 Gram(s) Oral daily  risperiDONE   Tablet 0.25 milliGRAM(s) Oral at bedtime  senna 2 Tablet(s) Oral at bedtime  traZODone 100 milliGRAM(s) Oral at bedtime    MEDICATIONS  (PRN):  acetaminophen     Tablet .. 650 milliGRAM(s) Oral every 6 hours PRN Temp greater or equal to 38C (100.4F), Mild Pain (1 - 3)  aluminum hydroxide/magnesium hydroxide/simethicone Suspension 30 milliLiter(s) Oral every 4 hours PRN Dyspepsia  melatonin 3 milliGRAM(s) Oral at bedtime PRN Insomnia  ondansetron Injectable 4 milliGRAM(s) IV Push every 8 hours PRN Nausea and/or Vomiting      Vital Signs Last 24 Hrs  T(C): 36.1 (14 Mar 2025 05:00), Max: 36.7 (13 Mar 2025 13:39)  T(F): 97 (14 Mar 2025 05:00), Max: 98.1 (13 Mar 2025 13:39)  HR: 77 (14 Mar 2025 05:00) (66 - 84)  BP: 188/95 (14 Mar 2025 05:00) (142/69 - 188/95)  BP(mean): --  RR: 17 (14 Mar 2025 05:00) (17 - 18)  SpO2: 95% (14 Mar 2025 05:00) (95% - 96%)    Parameters below as of 14 Mar 2025 05:00  Patient On (Oxygen Delivery Method): room air      CAPILLARY BLOOD GLUCOSE        I&O's Summary    13 Mar 2025 07:01  -  14 Mar 2025 07:00  --------------------------------------------------------  IN: 0 mL / OUT: 1001 mL / NET: -1001 mL        PHYSICAL EXAM:  GENERAL: NAD, well-developed elderly female   HEAD:  Atraumatic, Normocephalic  NECK: Supple, No JVD  CHEST/LUNG: Clear to auscultation bilaterally; No wheeze, nonlabored breathing  HEART: Regular rate and rhythm; No murmurs, rubs, or gallops  ABDOMEN: Soft, Nontender, Nondistended; Bowel sounds present  EXTREMITIES:  No clubbing, cyanosis, or edema  PSYCH: calm, appropriate mood    LABS:    03-14    x   |  x   |  x   ----------------------------<  x   x    |  x   |  0.58      TPro  6.8  /  Alb  3.0[L]  /  TBili  0.4  /  DBili  0.1  /  AST  22  /  ALT  27  /  AlkPhos  75  03-14    PT/INR - ( 14 Mar 2025 06:15 )   PT: 12.1 sec;   INR: 1.03 ratio                   RADIOLOGY & ADDITIONAL TESTS:  Results Reviewed:   Imaging Personally Reviewed:  Electrocardiogram Personally Reviewed:    COORDINATION OF CARE:  Care Discussed with Consultants/Other Providers [Y/N]:  Prior or Outpatient Records Reviewed [Y/N]:
Patient is a 84y old  Female who presents with a chief complaint of Generalized weakness, +Covid (12 Mar 2025 10:11)    Patient seen and examined at bedside.  no acute overnight events    ALLERGIES:  Fresh Pepper (Unknown)  No Known Allergies        Vital Signs Last 24 Hrs  T(F): 99.2 (12 Mar 2025 19:25), Max: 99.2 (12 Mar 2025 19:25)  HR: 76 (12 Mar 2025 19:25) (76 - 92)  BP: 169/67 (12 Mar 2025 19:25) (160/83 - 170/95)  RR: 17 (12 Mar 2025 19:25) (17 - 18)  SpO2: 92% (12 Mar 2025 19:25) (92% - 94%)  I&O's Summary    12 Mar 2025 07:01  -  13 Mar 2025 07:00  --------------------------------------------------------  IN: 0 mL / OUT: 300 mL / NET: -300 mL      MEDICATIONS:  acetaminophen     Tablet .. 650 milliGRAM(s) Oral every 6 hours PRN  aluminum hydroxide/magnesium hydroxide/simethicone Suspension 30 milliLiter(s) Oral every 4 hours PRN  aspirin  chewable 81 milliGRAM(s) Oral daily  atorvastatin 80 milliGRAM(s) Oral at bedtime  calcium carbonate 1250 mG  + Vitamin D (OsCal 500 + D) 1 Tablet(s) Oral daily  clopidogrel Tablet      clopidogrel Tablet 75 milliGRAM(s) Oral daily  dexAMETHasone     Tablet 6 milliGRAM(s) Oral daily  enoxaparin Injectable 40 milliGRAM(s) SubCutaneous every 24 hours  famotidine    Tablet 20 milliGRAM(s) Oral daily  losartan 100 milliGRAM(s) Oral daily  melatonin 3 milliGRAM(s) Oral at bedtime PRN  metoprolol tartrate 25 milliGRAM(s) Oral two times a day  ondansetron Injectable 4 milliGRAM(s) IV Push every 8 hours PRN  polyethylene glycol 3350 17 Gram(s) Oral daily  risperiDONE   Tablet 0.25 milliGRAM(s) Oral at bedtime  senna 2 Tablet(s) Oral at bedtime  traZODone 100 milliGRAM(s) Oral at bedtime      PHYSICAL EXAM:  General: NAD, Alert but confused  ENT: MMM, no thrush  Neck: Supple, No JVD  Lungs: Clear to auscultation bilaterally, non labored, fair inspiratory effort  Cardio: RRR, S1/S2, No murmurs  Abdomen: Soft, Nontender, Nondistended; Bowel sounds present  Extremities: No cyanosis, No edema    LABS:                        13.8   8.40  )-----------( 186      ( 11 Mar 2025 06:25 )             42.7     03-13    x   |  x   |  x   ----------------------------<  x   x    |  x   |  0.66    Ca    9.3      11 Mar 2025 06:25    TPro  7.9  /  Alb  3.3  /  TBili  0.5  /  DBili  0.1  /  AST  29  /  ALT  27  /  AlkPhos  78  03-13      PT/INR - ( 13 Mar 2025 08:40 )   PT: 12.3 sec;   INR: 1.04 ratio         PTT - ( 10 Mar 2025 19:25 )  PTT:40.3 sec    CARDIAC MARKERS ( 11 Mar 2025 06:25 )  x     / 64.2 ng/L / x     / x     / x      CARDIAC MARKERS ( 10 Mar 2025 21:43 )  x     / 60.2 ng/L / x     / x     / x      CARDIAC MARKERS ( 10 Mar 2025 19:25 )  x     / 60.3 ng/L / x     / x     / x          03-11 Chol 147 mg/dL LDL -- HDL 61 mg/dL Trig 69 mg/dL                  Urinalysis Basic - ( 11 Mar 2025 06:25 )    Color: x / Appearance: x / SG: x / pH: x  Gluc: 122 mg/dL / Ketone: x  / Bili: x / Urobili: x   Blood: x / Protein: x / Nitrite: x   Leuk Esterase: x / RBC: x / WBC x   Sq Epi: x / Non Sq Epi: x / Bacteria: x            RADIOLOGY & ADDITIONAL TESTS:    Care Discussed with Consultants/Other Providers:   
Patient is a 84y old  Female who presents with a chief complaint of Infection due to severe acute respiratory syndrome coronavirus 2 (SARS-CoV-2)        Patient seen and examined at bedside.    ALLERGIES:  Fresh Pepper (Unknown)  No Known Allergies    MEDICATIONS  (STANDING):  aspirin  chewable 81 milliGRAM(s) Oral daily  atorvastatin 80 milliGRAM(s) Oral at bedtime  calcium carbonate 1250 mG  + Vitamin D (OsCal 500 + D) 1 Tablet(s) Oral daily  dexAMETHasone     Tablet 6 milliGRAM(s) Oral daily  enoxaparin Injectable 40 milliGRAM(s) SubCutaneous every 24 hours  famotidine    Tablet 20 milliGRAM(s) Oral daily  losartan 100 milliGRAM(s) Oral daily  metoprolol tartrate 25 milliGRAM(s) Oral two times a day  polyethylene glycol 3350 17 Gram(s) Oral daily  remdesivir  IVPB   IV Intermittent   risperiDONE   Tablet 0.25 milliGRAM(s) Oral at bedtime  senna 2 Tablet(s) Oral at bedtime  traZODone 100 milliGRAM(s) Oral at bedtime    MEDICATIONS  (PRN):  acetaminophen     Tablet .. 650 milliGRAM(s) Oral every 6 hours PRN Temp greater or equal to 38C (100.4F), Mild Pain (1 - 3)  aluminum hydroxide/magnesium hydroxide/simethicone Suspension 30 milliLiter(s) Oral every 4 hours PRN Dyspepsia  melatonin 3 milliGRAM(s) Oral at bedtime PRN Insomnia  ondansetron Injectable 4 milliGRAM(s) IV Push every 8 hours PRN Nausea and/or Vomiting    Vital Signs Last 24 Hrs  T(F): 98 (11 Mar 2025 05:01), Max: 98.7 (10 Mar 2025 19:15)  HR: 85 (11 Mar 2025 05:01) (75 - 109)  BP: 181/97 (11 Mar 2025 05:01) (147/85 - 188/94)  RR: 18 (11 Mar 2025 05:01) (18 - 18)  SpO2: 95% (11 Mar 2025 05:01) (95% - 99%)  I&O's Summary    BMI (kg/m2): 31 (03-10-25 @ 18:40)  PHYSICAL EXAM:  General: NAD, A/O x 2, oxygen via NC  ENT: MMM, no scleral icterus  Neck: Supple, No JVD, no thyroidomegaly  Lungs: Clear to auscultation bilaterally, no wheezes, no rales, no rhonchi, good inspiratory effort  Cardio: RRR, S1/S2, No murmurs  Abdomen: Soft, Nontender, Nondistended; Bowel sounds present  Extremities: No calf tenderness, No pitting edema, no skin changes    LABS:                        13.8   8.40  )-----------( 186      ( 11 Mar 2025 06:25 )             42.7       03-11    138  |  103  |  10  ----------------------------<  122  4.0   |  25  |  0.79    Ca    9.3      11 Mar 2025 06:25    TPro  7.6  /  Alb  3.2  /  TBili  0.5  /  DBili  0.1  /  AST  19  /  ALT  22  /  AlkPhos  83  03-11       PT/INR - ( 11 Mar 2025 06:25 )   PT: 12.2 sec;   INR: 1.03 ratio         PTT - ( 10 Mar 2025 19:25 )  PTT:40.3 sec     CARDIAC MARKERS ( 11 Mar 2025 06:25 )  x     / 64.2 ng/L / x     / x     / x      CARDIAC MARKERS ( 10 Mar 2025 21:43 )  x     / 60.2 ng/L / x     / x     / x      CARDIAC MARKERS ( 10 Mar 2025 19:25 )  x     / 60.3 ng/L / x     / x     / x          CT Blood Glucose.: 144 mg/dL (10 Mar 2025 18:38)    Urinalysis Basic - ( 11 Mar 2025 06:25 )    Color: x / Appearance: x / SG: x / pH: x  Gluc: 122 mg/dL / Ketone: x  / Bili: x / Urobili: x   Blood: x / Protein: x / Nitrite: x   Leuk Esterase: x / RBC: x / WBC x   Sq Epi: x / Non Sq Epi: x / Bacteria: x

## 2025-03-19 NOTE — ED ADULT NURSE NOTE - NS PRO AD PATIENT TYPE ON CHART
[facility] Medical Source Statement for patient Jayesh Lewis was placed in Dr. Sadler's folder on 03/05/2025@9:03am.//PT.  
[facility]  MEDICAL SOURCE STATEMENT form placed in [facility] Bin  
Spoke with patient and informed patient that a referral was sent out to [facility] for this Disability Form to be completed. Patient stated will come and pick form up. Form placed in Patient's  filing cabinet.  
Health Care Proxy (HCP)/Power of  (POA) for Healthcare Issues

## 2025-04-28 NOTE — PHYSICAL THERAPY INITIAL EVALUATION ADULT - IMPAIRMENTS CONTRIBUTING TO GAIT DEVIATIONS, PT EVAL
constant vc's/tc's to maintain hands on walker/impaired balance/cognition/decreased strength
Render Risk Assessment In Note?: no
Detail Level: Simple
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